# Patient Record
Sex: FEMALE | Race: WHITE | Employment: OTHER | URBAN - NONMETROPOLITAN AREA
[De-identification: names, ages, dates, MRNs, and addresses within clinical notes are randomized per-mention and may not be internally consistent; named-entity substitution may affect disease eponyms.]

---

## 2017-01-01 ENCOUNTER — HOSPITAL ENCOUNTER (INPATIENT)
Facility: HOSPITAL | Age: 82
LOS: 3 days | Discharge: NURSING, REHAB OR LONG-TERM CARE | DRG: 281 | End: 2017-06-20
Attending: FAMILY MEDICINE | Admitting: FAMILY MEDICINE
Payer: MEDICARE

## 2017-01-01 ENCOUNTER — APPOINTMENT (OUTPATIENT)
Dept: PHYSICAL THERAPY | Facility: HOSPITAL | Age: 82
DRG: 194 | End: 2017-01-01
Payer: MEDICARE

## 2017-01-01 ENCOUNTER — DOCUMENTATION ONLY (OUTPATIENT)
Dept: OTHER | Facility: CLINIC | Age: 82
End: 2017-01-01

## 2017-01-01 ENCOUNTER — HOSPITAL ENCOUNTER (EMERGENCY)
Facility: HOSPITAL | Age: 82
Discharge: HOME | End: 2017-04-24
Attending: NURSE PRACTITIONER | Admitting: NURSE PRACTITIONER
Payer: MEDICARE

## 2017-01-01 ENCOUNTER — HOSPITAL ENCOUNTER (EMERGENCY)
Facility: HOSPITAL | Age: 82
Discharge: HOME | End: 2017-05-14
Payer: MEDICARE

## 2017-01-01 ENCOUNTER — MEDICAL CORRESPONDENCE (OUTPATIENT)
Dept: HEALTH INFORMATION MANAGEMENT | Facility: HOSPITAL | Age: 82
End: 2017-01-01

## 2017-01-01 ENCOUNTER — APPOINTMENT (OUTPATIENT)
Dept: PHYSICAL THERAPY | Facility: HOSPITAL | Age: 82
DRG: 194 | End: 2017-01-01
Attending: FAMILY MEDICINE
Payer: MEDICARE

## 2017-01-01 ENCOUNTER — APPOINTMENT (OUTPATIENT)
Dept: ULTRASOUND IMAGING | Facility: HOSPITAL | Age: 82
DRG: 281 | End: 2017-01-01
Payer: MEDICARE

## 2017-01-01 ENCOUNTER — HOSPITAL ENCOUNTER (INPATIENT)
Facility: HOSPITAL | Age: 82
LOS: 3 days | Discharge: NURSING, REHAB OR LONG-TERM CARE | DRG: 194 | End: 2017-06-05
Attending: EMERGENCY MEDICINE | Admitting: FAMILY MEDICINE
Payer: MEDICARE

## 2017-01-01 ENCOUNTER — DOCUMENTATION ONLY (OUTPATIENT)
Dept: FAMILY MEDICINE | Facility: OTHER | Age: 82
End: 2017-01-01

## 2017-01-01 VITALS
BODY MASS INDEX: 33.39 KG/M2 | RESPIRATION RATE: 18 BRPM | SYSTOLIC BLOOD PRESSURE: 125 MMHG | TEMPERATURE: 97.6 F | DIASTOLIC BLOOD PRESSURE: 55 MMHG | HEART RATE: 78 BPM | WEIGHT: 188.49 LBS | OXYGEN SATURATION: 96 %

## 2017-01-01 VITALS
HEART RATE: 82 BPM | RESPIRATION RATE: 16 BRPM | DIASTOLIC BLOOD PRESSURE: 65 MMHG | WEIGHT: 190.48 LBS | TEMPERATURE: 98.2 F | OXYGEN SATURATION: 94 % | SYSTOLIC BLOOD PRESSURE: 148 MMHG | HEIGHT: 63 IN | BODY MASS INDEX: 33.75 KG/M2

## 2017-01-01 VITALS
HEART RATE: 78 BPM | OXYGEN SATURATION: 94 % | RESPIRATION RATE: 20 BRPM | SYSTOLIC BLOOD PRESSURE: 105 MMHG | DIASTOLIC BLOOD PRESSURE: 60 MMHG | TEMPERATURE: 97.3 F

## 2017-01-01 VITALS
HEART RATE: 96 BPM | SYSTOLIC BLOOD PRESSURE: 149 MMHG | DIASTOLIC BLOOD PRESSURE: 85 MMHG | RESPIRATION RATE: 18 BRPM | OXYGEN SATURATION: 93 % | TEMPERATURE: 97 F

## 2017-01-01 DIAGNOSIS — H04.301 DACROCYSTITIS, RIGHT: ICD-10-CM

## 2017-01-01 DIAGNOSIS — S05.01XA CORNEAL ABRASION, RIGHT, INITIAL ENCOUNTER: ICD-10-CM

## 2017-01-01 DIAGNOSIS — R53.1 GENERAL WEAKNESS: ICD-10-CM

## 2017-01-01 DIAGNOSIS — Z71.89 ADVANCE CARE PLANNING: Primary | Chronic | ICD-10-CM

## 2017-01-01 DIAGNOSIS — I21.9 ACUTE MYOCARDIAL INFARCTION, INITIAL EPISODE OF CARE (H): ICD-10-CM

## 2017-01-01 DIAGNOSIS — J18.9 PNEUMONIA OF LEFT LOWER LOBE DUE TO INFECTIOUS ORGANISM: Primary | ICD-10-CM

## 2017-01-01 DIAGNOSIS — R79.89 D-DIMER, ELEVATED: ICD-10-CM

## 2017-01-01 LAB
ALBUMIN SERPL-MCNC: 3 G/DL (ref 3.4–5)
ALBUMIN SERPL-MCNC: 3.3 G/DL (ref 3.4–5)
ALBUMIN UR-MCNC: NEGATIVE MG/DL
ALP SERPL-CCNC: 96 U/L (ref 40–150)
ALP SERPL-CCNC: 98 U/L (ref 40–150)
ALT SERPL W P-5'-P-CCNC: 33 U/L (ref 0–50)
ALT SERPL W P-5'-P-CCNC: 40 U/L (ref 0–50)
AMORPH CRY #/AREA URNS HPF: ABNORMAL /HPF
ANION GAP SERPL CALCULATED.3IONS-SCNC: 10 MMOL/L (ref 3–14)
ANION GAP SERPL CALCULATED.3IONS-SCNC: 12 MMOL/L (ref 3–14)
ANION GAP SERPL CALCULATED.3IONS-SCNC: 13 MMOL/L (ref 3–14)
ANION GAP SERPL CALCULATED.3IONS-SCNC: 7 MMOL/L (ref 3–14)
ANION GAP SERPL CALCULATED.3IONS-SCNC: 8 MMOL/L (ref 3–14)
ANION GAP SERPL CALCULATED.3IONS-SCNC: 8 MMOL/L (ref 3–14)
ANION GAP SERPL CALCULATED.3IONS-SCNC: 9 MMOL/L (ref 3–14)
ANION GAP SERPL CALCULATED.3IONS-SCNC: 9 MMOL/L (ref 3–14)
APPEARANCE UR: CLEAR
APTT PPP: 24 SEC (ref 24–37)
APTT PPP: 26 SEC (ref 24–37)
AST SERPL W P-5'-P-CCNC: 34 U/L (ref 0–45)
AST SERPL W P-5'-P-CCNC: 51 U/L (ref 0–45)
BACTERIA #/AREA URNS HPF: ABNORMAL /HPF
BACTERIA SPEC CULT: ABNORMAL
BACTERIA SPEC CULT: NORMAL
BASOPHILS # BLD AUTO: 0 10E9/L (ref 0–0.2)
BASOPHILS NFR BLD AUTO: 0.2 %
BASOPHILS NFR BLD AUTO: 0.4 %
BASOPHILS NFR BLD AUTO: 0.4 %
BASOPHILS NFR BLD AUTO: 0.5 %
BASOPHILS NFR BLD AUTO: 0.6 %
BILIRUB SERPL-MCNC: 0.4 MG/DL (ref 0.2–1.3)
BILIRUB SERPL-MCNC: 0.5 MG/DL (ref 0.2–1.3)
BILIRUB UR QL STRIP: NEGATIVE
BUN SERPL-MCNC: 14 MG/DL (ref 7–30)
BUN SERPL-MCNC: 19 MG/DL (ref 7–30)
BUN SERPL-MCNC: 20 MG/DL (ref 7–30)
BUN SERPL-MCNC: 22 MG/DL (ref 7–30)
BUN SERPL-MCNC: 37 MG/DL (ref 7–30)
BUN SERPL-MCNC: 48 MG/DL (ref 7–30)
BUN SERPL-MCNC: 69 MG/DL (ref 7–30)
BUN SERPL-MCNC: 82 MG/DL (ref 7–30)
CALCIUM SERPL-MCNC: 8 MG/DL (ref 8.5–10.1)
CALCIUM SERPL-MCNC: 8.2 MG/DL (ref 8.5–10.1)
CALCIUM SERPL-MCNC: 8.3 MG/DL (ref 8.5–10.1)
CALCIUM SERPL-MCNC: 8.4 MG/DL (ref 8.5–10.1)
CALCIUM SERPL-MCNC: 8.7 MG/DL (ref 8.5–10.1)
CALCIUM SERPL-MCNC: 9.4 MG/DL (ref 8.5–10.1)
CHLORIDE SERPL-SCNC: 100 MMOL/L (ref 94–109)
CHLORIDE SERPL-SCNC: 102 MMOL/L (ref 94–109)
CHLORIDE SERPL-SCNC: 104 MMOL/L (ref 94–109)
CHLORIDE SERPL-SCNC: 92 MMOL/L (ref 94–109)
CHLORIDE SERPL-SCNC: 96 MMOL/L (ref 94–109)
CHLORIDE SERPL-SCNC: 97 MMOL/L (ref 94–109)
CO2 SERPL-SCNC: 22 MMOL/L (ref 20–32)
CO2 SERPL-SCNC: 24 MMOL/L (ref 20–32)
CO2 SERPL-SCNC: 25 MMOL/L (ref 20–32)
CO2 SERPL-SCNC: 25 MMOL/L (ref 20–32)
CO2 SERPL-SCNC: 26 MMOL/L (ref 20–32)
CO2 SERPL-SCNC: 26 MMOL/L (ref 20–32)
CO2 SERPL-SCNC: 27 MMOL/L (ref 20–32)
CO2 SERPL-SCNC: 28 MMOL/L (ref 20–32)
COLOR UR AUTO: ABNORMAL
COLOR UR AUTO: YELLOW
COLOR UR AUTO: YELLOW
CREAT SERPL-MCNC: 1.12 MG/DL (ref 0.52–1.04)
CREAT SERPL-MCNC: 1.17 MG/DL (ref 0.52–1.04)
CREAT SERPL-MCNC: 1.31 MG/DL (ref 0.52–1.04)
CREAT SERPL-MCNC: 1.38 MG/DL (ref 0.52–1.04)
CREAT SERPL-MCNC: 1.4 MG/DL (ref 0.52–1.04)
CREAT SERPL-MCNC: 1.61 MG/DL (ref 0.52–1.04)
CREAT SERPL-MCNC: 1.64 MG/DL (ref 0.52–1.04)
CREAT SERPL-MCNC: 2 MG/DL (ref 0.52–1.04)
CRP SERPL-MCNC: <2.9 MG/L (ref 0–8)
D DIMER PPP DDU-MCNC: 1737 NG/ML D-DU (ref 0–300)
DIFFERENTIAL METHOD BLD: ABNORMAL
DIFFERENTIAL METHOD BLD: NORMAL
EOSINOPHIL # BLD AUTO: 0.1 10E9/L (ref 0–0.7)
EOSINOPHIL NFR BLD AUTO: 1.3 %
EOSINOPHIL NFR BLD AUTO: 1.5 %
EOSINOPHIL NFR BLD AUTO: 1.7 %
EOSINOPHIL NFR BLD AUTO: 2.1 %
EOSINOPHIL NFR BLD AUTO: 2.4 %
ERYTHROCYTE [DISTWIDTH] IN BLOOD BY AUTOMATED COUNT: 12.8 % (ref 10–15)
ERYTHROCYTE [DISTWIDTH] IN BLOOD BY AUTOMATED COUNT: 12.9 % (ref 10–15)
ERYTHROCYTE [DISTWIDTH] IN BLOOD BY AUTOMATED COUNT: 13 % (ref 10–15)
ERYTHROCYTE [DISTWIDTH] IN BLOOD BY AUTOMATED COUNT: 13.1 % (ref 10–15)
ERYTHROCYTE [DISTWIDTH] IN BLOOD BY AUTOMATED COUNT: 13.2 % (ref 10–15)
ERYTHROCYTE [DISTWIDTH] IN BLOOD BY AUTOMATED COUNT: 13.3 % (ref 10–15)
ERYTHROCYTE [DISTWIDTH] IN BLOOD BY AUTOMATED COUNT: 13.4 % (ref 10–15)
ERYTHROCYTE [SEDIMENTATION RATE] IN BLOOD BY WESTERGREN METHOD: 30 MM/H (ref 0–30)
EST. AVERAGE GLUCOSE BLD GHB EST-MCNC: 232 MG/DL
GFR SERPL CREATININE-BSD FRML MDRD: 24 ML/MIN/1.7M2
GFR SERPL CREATININE-BSD FRML MDRD: 30 ML/MIN/1.7M2
GFR SERPL CREATININE-BSD FRML MDRD: 30 ML/MIN/1.7M2
GFR SERPL CREATININE-BSD FRML MDRD: 35 ML/MIN/1.7M2
GFR SERPL CREATININE-BSD FRML MDRD: 36 ML/MIN/1.7M2
GFR SERPL CREATININE-BSD FRML MDRD: 38 ML/MIN/1.7M2
GFR SERPL CREATININE-BSD FRML MDRD: 44 ML/MIN/1.7M2
GFR SERPL CREATININE-BSD FRML MDRD: 46 ML/MIN/1.7M2
GLUCOSE BLDC GLUCOMTR-MCNC: 127 MG/DL (ref 70–99)
GLUCOSE BLDC GLUCOMTR-MCNC: 132 MG/DL (ref 70–99)
GLUCOSE BLDC GLUCOMTR-MCNC: 154 MG/DL (ref 70–99)
GLUCOSE BLDC GLUCOMTR-MCNC: 169 MG/DL (ref 70–99)
GLUCOSE BLDC GLUCOMTR-MCNC: 170 MG/DL (ref 70–99)
GLUCOSE BLDC GLUCOMTR-MCNC: 170 MG/DL (ref 70–99)
GLUCOSE BLDC GLUCOMTR-MCNC: 171 MG/DL (ref 70–99)
GLUCOSE BLDC GLUCOMTR-MCNC: 172 MG/DL (ref 70–99)
GLUCOSE BLDC GLUCOMTR-MCNC: 185 MG/DL (ref 70–99)
GLUCOSE BLDC GLUCOMTR-MCNC: 187 MG/DL (ref 70–99)
GLUCOSE BLDC GLUCOMTR-MCNC: 205 MG/DL (ref 70–99)
GLUCOSE BLDC GLUCOMTR-MCNC: 208 MG/DL (ref 70–99)
GLUCOSE BLDC GLUCOMTR-MCNC: 219 MG/DL (ref 70–99)
GLUCOSE BLDC GLUCOMTR-MCNC: 220 MG/DL (ref 70–99)
GLUCOSE BLDC GLUCOMTR-MCNC: 220 MG/DL (ref 70–99)
GLUCOSE BLDC GLUCOMTR-MCNC: 227 MG/DL (ref 70–99)
GLUCOSE BLDC GLUCOMTR-MCNC: 236 MG/DL (ref 70–99)
GLUCOSE BLDC GLUCOMTR-MCNC: 254 MG/DL (ref 70–99)
GLUCOSE BLDC GLUCOMTR-MCNC: 257 MG/DL (ref 70–99)
GLUCOSE BLDC GLUCOMTR-MCNC: 276 MG/DL (ref 70–99)
GLUCOSE SERPL-MCNC: 118 MG/DL (ref 70–99)
GLUCOSE SERPL-MCNC: 178 MG/DL (ref 70–99)
GLUCOSE SERPL-MCNC: 186 MG/DL (ref 70–99)
GLUCOSE SERPL-MCNC: 188 MG/DL (ref 70–99)
GLUCOSE SERPL-MCNC: 192 MG/DL (ref 70–99)
GLUCOSE SERPL-MCNC: 196 MG/DL (ref 70–99)
GLUCOSE SERPL-MCNC: 220 MG/DL (ref 70–99)
GLUCOSE SERPL-MCNC: 269 MG/DL (ref 70–99)
GLUCOSE UR STRIP-MCNC: 70 MG/DL
GLUCOSE UR STRIP-MCNC: NEGATIVE MG/DL
GLUCOSE UR STRIP-MCNC: NEGATIVE MG/DL
HBA1C MFR BLD: 9.7 % (ref 4.3–6)
HCT VFR BLD AUTO: 34.2 % (ref 35–47)
HCT VFR BLD AUTO: 36.3 % (ref 35–47)
HCT VFR BLD AUTO: 37.9 % (ref 35–47)
HCT VFR BLD AUTO: 39.6 % (ref 35–47)
HCT VFR BLD AUTO: 39.9 % (ref 35–47)
HCT VFR BLD AUTO: 40.2 % (ref 35–47)
HCT VFR BLD AUTO: 41.1 % (ref 35–47)
HGB BLD-MCNC: 11.9 G/DL (ref 11.7–15.7)
HGB BLD-MCNC: 12.5 G/DL (ref 11.7–15.7)
HGB BLD-MCNC: 13.1 G/DL (ref 11.7–15.7)
HGB BLD-MCNC: 13.3 G/DL (ref 11.7–15.7)
HGB BLD-MCNC: 13.3 G/DL (ref 11.7–15.7)
HGB BLD-MCNC: 14.3 G/DL (ref 11.7–15.7)
HGB BLD-MCNC: 14.4 G/DL (ref 11.7–15.7)
HGB UR QL STRIP: NEGATIVE
HYALINE CASTS #/AREA URNS LPF: 1 /LPF (ref 0–2)
HYALINE CASTS #/AREA URNS LPF: 5 /LPF (ref 0–2)
IMM GRANULOCYTES # BLD: 0 10E9/L (ref 0–0.4)
IMM GRANULOCYTES NFR BLD: 0 %
IMM GRANULOCYTES NFR BLD: 0.2 %
IMM GRANULOCYTES NFR BLD: 0.3 %
IMM GRANULOCYTES NFR BLD: 0.4 %
IMM GRANULOCYTES NFR BLD: 0.4 %
INR PPP: 1.01 (ref 0.8–1.2)
INR PPP: 1.15 (ref 0.8–1.2)
KETONES BLD-SCNC: 0.3 MMOL/L (ref 0–0.6)
KETONES UR STRIP-MCNC: NEGATIVE MG/DL
LACTATE SERPL-SCNC: 1.8 MMOL/L (ref 0.4–2)
LACTATE SERPL-SCNC: 2.3 MMOL/L (ref 0.4–2)
LACTATE SERPL-SCNC: 2.4 MMOL/L (ref 0.4–2)
LACTATE SERPL-SCNC: 3.1 MMOL/L (ref 0.4–2)
LEUKOCYTE ESTERASE UR QL STRIP: ABNORMAL
LEUKOCYTE ESTERASE UR QL STRIP: NEGATIVE
LEUKOCYTE ESTERASE UR QL STRIP: NEGATIVE
LMWH PPP CHRO-ACNC: 0.47 IU/ML
LMWH PPP CHRO-ACNC: 0.62 IU/ML
LMWH PPP CHRO-ACNC: 0.93 IU/ML
LMWH PPP CHRO-ACNC: 1.11 IU/ML
LMWH PPP CHRO-ACNC: 1.9 IU/ML
LYMPHOCYTES # BLD AUTO: 1.2 10E9/L (ref 0.8–5.3)
LYMPHOCYTES # BLD AUTO: 1.5 10E9/L (ref 0.8–5.3)
LYMPHOCYTES # BLD AUTO: 1.7 10E9/L (ref 0.8–5.3)
LYMPHOCYTES # BLD AUTO: 1.8 10E9/L (ref 0.8–5.3)
LYMPHOCYTES # BLD AUTO: 2.1 10E9/L (ref 0.8–5.3)
LYMPHOCYTES NFR BLD AUTO: 33.4 %
LYMPHOCYTES NFR BLD AUTO: 35.6 %
LYMPHOCYTES NFR BLD AUTO: 36 %
LYMPHOCYTES NFR BLD AUTO: 39 %
LYMPHOCYTES NFR BLD AUTO: 45.5 %
Lab: NORMAL
Lab: NORMAL
MAGNESIUM SERPL-MCNC: 2.1 MG/DL (ref 1.6–2.3)
MAGNESIUM SERPL-MCNC: 2.2 MG/DL (ref 1.6–2.3)
MCH RBC QN AUTO: 32.4 PG (ref 26.5–33)
MCH RBC QN AUTO: 32.7 PG (ref 26.5–33)
MCH RBC QN AUTO: 32.8 PG (ref 26.5–33)
MCH RBC QN AUTO: 32.9 PG (ref 26.5–33)
MCH RBC QN AUTO: 32.9 PG (ref 26.5–33)
MCH RBC QN AUTO: 33.3 PG (ref 26.5–33)
MCH RBC QN AUTO: 33.6 PG (ref 26.5–33)
MCHC RBC AUTO-ENTMCNC: 33.3 G/DL (ref 31.5–36.5)
MCHC RBC AUTO-ENTMCNC: 33.6 G/DL (ref 31.5–36.5)
MCHC RBC AUTO-ENTMCNC: 34.4 G/DL (ref 31.5–36.5)
MCHC RBC AUTO-ENTMCNC: 34.6 G/DL (ref 31.5–36.5)
MCHC RBC AUTO-ENTMCNC: 34.8 G/DL (ref 31.5–36.5)
MCHC RBC AUTO-ENTMCNC: 34.8 G/DL (ref 31.5–36.5)
MCHC RBC AUTO-ENTMCNC: 35.8 G/DL (ref 31.5–36.5)
MCV RBC AUTO: 93 FL (ref 78–100)
MCV RBC AUTO: 94 FL (ref 78–100)
MCV RBC AUTO: 94 FL (ref 78–100)
MCV RBC AUTO: 96 FL (ref 78–100)
MCV RBC AUTO: 97 FL (ref 78–100)
MCV RBC AUTO: 98 FL (ref 78–100)
MCV RBC AUTO: 99 FL (ref 78–100)
MICRO REPORT STATUS: ABNORMAL
MICRO REPORT STATUS: NORMAL
MICROORGANISM SPEC CULT: ABNORMAL
MONOCYTES # BLD AUTO: 0.4 10E9/L (ref 0–1.3)
MONOCYTES # BLD AUTO: 0.4 10E9/L (ref 0–1.3)
MONOCYTES # BLD AUTO: 0.5 10E9/L (ref 0–1.3)
MONOCYTES NFR BLD AUTO: 11.2 %
MONOCYTES NFR BLD AUTO: 12.4 %
MONOCYTES NFR BLD AUTO: 8.9 %
MONOCYTES NFR BLD AUTO: 9 %
MONOCYTES NFR BLD AUTO: 9.1 %
MUCOUS THREADS #/AREA URNS LPF: PRESENT /LPF
NEUTROPHILS # BLD AUTO: 1.5 10E9/L (ref 1.6–8.3)
NEUTROPHILS # BLD AUTO: 1.7 10E9/L (ref 1.6–8.3)
NEUTROPHILS # BLD AUTO: 2.1 10E9/L (ref 1.6–8.3)
NEUTROPHILS # BLD AUTO: 2.6 10E9/L (ref 1.6–8.3)
NEUTROPHILS # BLD AUTO: 3 10E9/L (ref 1.6–8.3)
NEUTROPHILS NFR BLD AUTO: 39.2 %
NEUTROPHILS NFR BLD AUTO: 50 %
NEUTROPHILS NFR BLD AUTO: 50.2 %
NEUTROPHILS NFR BLD AUTO: 52.8 %
NEUTROPHILS NFR BLD AUTO: 55.3 %
NITRATE UR QL: NEGATIVE
NITRATE UR QL: NEGATIVE
NITRATE UR QL: POSITIVE
NRBC # BLD AUTO: 0 10*3/UL
NRBC BLD AUTO-RTO: 0 /100
NT-PROBNP SERPL-MCNC: 788 PG/ML (ref 0–1800)
PH UR STRIP: 5 PH (ref 4.7–8)
PH UR STRIP: 6.5 PH (ref 4.7–8)
PH UR STRIP: 7.5 PH (ref 4.7–8)
PLATELET # BLD AUTO: 145 10E9/L (ref 150–450)
PLATELET # BLD AUTO: 149 10E9/L (ref 150–450)
PLATELET # BLD AUTO: 149 10E9/L (ref 150–450)
PLATELET # BLD AUTO: 166 10E9/L (ref 150–450)
PLATELET # BLD AUTO: 169 10E9/L (ref 150–450)
PLATELET # BLD AUTO: 187 10E9/L (ref 150–450)
PLATELET # BLD AUTO: 202 10E9/L (ref 150–450)
PLATELET # BLD AUTO: 236 10E9/L (ref 150–450)
POTASSIUM SERPL-SCNC: 4 MMOL/L (ref 3.4–5.3)
POTASSIUM SERPL-SCNC: 4.2 MMOL/L (ref 3.4–5.3)
POTASSIUM SERPL-SCNC: 4.5 MMOL/L (ref 3.4–5.3)
POTASSIUM SERPL-SCNC: 4.5 MMOL/L (ref 3.4–5.3)
POTASSIUM SERPL-SCNC: 4.6 MMOL/L (ref 3.4–5.3)
POTASSIUM SERPL-SCNC: 4.6 MMOL/L (ref 3.4–5.3)
POTASSIUM SERPL-SCNC: 4.8 MMOL/L (ref 3.4–5.3)
POTASSIUM SERPL-SCNC: 5.3 MMOL/L (ref 3.4–5.3)
PROT SERPL-MCNC: 7.7 G/DL (ref 6.8–8.8)
PROT SERPL-MCNC: 8 G/DL (ref 6.8–8.8)
RBC # BLD AUTO: 3.54 10E12/L (ref 3.8–5.2)
RBC # BLD AUTO: 3.8 10E12/L (ref 3.8–5.2)
RBC # BLD AUTO: 4.04 10E12/L (ref 3.8–5.2)
RBC # BLD AUTO: 4.04 10E12/L (ref 3.8–5.2)
RBC # BLD AUTO: 4.06 10E12/L (ref 3.8–5.2)
RBC # BLD AUTO: 4.33 10E12/L (ref 3.8–5.2)
RBC # BLD AUTO: 4.37 10E12/L (ref 3.8–5.2)
RBC #/AREA URNS AUTO: <1 /HPF (ref 0–2)
SODIUM SERPL-SCNC: 130 MMOL/L (ref 133–144)
SODIUM SERPL-SCNC: 131 MMOL/L (ref 133–144)
SODIUM SERPL-SCNC: 133 MMOL/L (ref 133–144)
SODIUM SERPL-SCNC: 134 MMOL/L (ref 133–144)
SODIUM SERPL-SCNC: 136 MMOL/L (ref 133–144)
SODIUM SERPL-SCNC: 136 MMOL/L (ref 133–144)
SODIUM SERPL-SCNC: 137 MMOL/L (ref 133–144)
SODIUM SERPL-SCNC: 137 MMOL/L (ref 133–144)
SP GR UR STRIP: 1 (ref 1–1.03)
SP GR UR STRIP: 1.01 (ref 1–1.03)
SP GR UR STRIP: 1.01 (ref 1–1.03)
SPECIMEN SOURCE: ABNORMAL
SPECIMEN SOURCE: NORMAL
TROPONIN I SERPL-MCNC: 0.05 UG/L (ref 0–0.04)
TROPONIN I SERPL-MCNC: 0.22 UG/L (ref 0–0.04)
TROPONIN I SERPL-MCNC: 0.82 UG/L (ref 0–0.04)
TROPONIN I SERPL-MCNC: 5.98 UG/L (ref 0–0.04)
TSH SERPL DL<=0.05 MIU/L-ACNC: 1.65 MU/L (ref 0.4–4)
URN SPEC COLLECT METH UR: ABNORMAL
UROBILINOGEN UR STRIP-MCNC: NORMAL MG/DL (ref 0–2)
WBC # BLD AUTO: 3.1 10E9/L (ref 4–11)
WBC # BLD AUTO: 3.3 10E9/L (ref 4–11)
WBC # BLD AUTO: 3.3 10E9/L (ref 4–11)
WBC # BLD AUTO: 3.8 10E9/L (ref 4–11)
WBC # BLD AUTO: 4.1 10E9/L (ref 4–11)
WBC # BLD AUTO: 5.3 10E9/L (ref 4–11)
WBC # BLD AUTO: 5.4 10E9/L (ref 4–11)
WBC #/AREA URNS AUTO: 2 /HPF (ref 0–2)
WBC #/AREA URNS AUTO: <1 /HPF (ref 0–2)
WBC #/AREA URNS AUTO: <1 /HPF (ref 0–2)

## 2017-01-01 PROCEDURE — 25000132 ZZH RX MED GY IP 250 OP 250 PS 637: Mod: GY | Performed by: FAMILY MEDICINE

## 2017-01-01 PROCEDURE — 40000193 ZZH STATISTIC PT WARD VISIT

## 2017-01-01 PROCEDURE — 40000275 ZZH STATISTIC RCP TIME EA 10 MIN

## 2017-01-01 PROCEDURE — 87086 URINE CULTURE/COLONY COUNT: CPT | Performed by: EMERGENCY MEDICINE

## 2017-01-01 PROCEDURE — 71020 ZZHC CHEST TWO VIEWS, FRONT/LAT: CPT | Mod: TC

## 2017-01-01 PROCEDURE — 99283 EMERGENCY DEPT VISIT LOW MDM: CPT | Performed by: FAMILY MEDICINE

## 2017-01-01 PROCEDURE — 71010 ZZHC CHEST ONE VIEW: CPT | Mod: TC

## 2017-01-01 PROCEDURE — 97530 THERAPEUTIC ACTIVITIES: CPT | Mod: GP

## 2017-01-01 PROCEDURE — A9270 NON-COVERED ITEM OR SERVICE: HCPCS | Mod: GY | Performed by: FAMILY MEDICINE

## 2017-01-01 PROCEDURE — 85027 COMPLETE CBC AUTOMATED: CPT | Performed by: FAMILY MEDICINE

## 2017-01-01 PROCEDURE — 94640 AIRWAY INHALATION TREATMENT: CPT

## 2017-01-01 PROCEDURE — 84443 ASSAY THYROID STIM HORMONE: CPT | Performed by: EMERGENCY MEDICINE

## 2017-01-01 PROCEDURE — 36416 COLLJ CAPILLARY BLOOD SPEC: CPT | Performed by: EMERGENCY MEDICINE

## 2017-01-01 PROCEDURE — 83735 ASSAY OF MAGNESIUM: CPT | Performed by: FAMILY MEDICINE

## 2017-01-01 PROCEDURE — 25000128 H RX IP 250 OP 636: Performed by: FAMILY MEDICINE

## 2017-01-01 PROCEDURE — 36415 COLL VENOUS BLD VENIPUNCTURE: CPT | Performed by: FAMILY MEDICINE

## 2017-01-01 PROCEDURE — 25000128 H RX IP 250 OP 636

## 2017-01-01 PROCEDURE — 85730 THROMBOPLASTIN TIME PARTIAL: CPT | Performed by: FAMILY MEDICINE

## 2017-01-01 PROCEDURE — 80048 BASIC METABOLIC PNL TOTAL CA: CPT | Performed by: FAMILY MEDICINE

## 2017-01-01 PROCEDURE — 25000125 ZZHC RX 250

## 2017-01-01 PROCEDURE — 85025 COMPLETE CBC W/AUTO DIFF WBC: CPT | Performed by: EMERGENCY MEDICINE

## 2017-01-01 PROCEDURE — 25000131 ZZH RX MED GY IP 250 OP 636 PS 637: Mod: GY | Performed by: FAMILY MEDICINE

## 2017-01-01 PROCEDURE — 93306 TTE W/DOPPLER COMPLETE: CPT | Mod: TC

## 2017-01-01 PROCEDURE — 84484 ASSAY OF TROPONIN QUANT: CPT | Performed by: FAMILY MEDICINE

## 2017-01-01 PROCEDURE — 83605 ASSAY OF LACTIC ACID: CPT | Performed by: EMERGENCY MEDICINE

## 2017-01-01 PROCEDURE — 85610 PROTHROMBIN TIME: CPT | Performed by: FAMILY MEDICINE

## 2017-01-01 PROCEDURE — 81001 URINALYSIS AUTO W/SCOPE: CPT | Performed by: EMERGENCY MEDICINE

## 2017-01-01 PROCEDURE — 85025 COMPLETE CBC W/AUTO DIFF WBC: CPT

## 2017-01-01 PROCEDURE — 96365 THER/PROPH/DIAG IV INF INIT: CPT

## 2017-01-01 PROCEDURE — 40000893 ZZH STATISTIC PT IP EVAL DEFER: Performed by: PHYSICAL THERAPIST

## 2017-01-01 PROCEDURE — 99285 EMERGENCY DEPT VISIT HI MDM: CPT | Mod: 25

## 2017-01-01 PROCEDURE — 25000132 ZZH RX MED GY IP 250 OP 250 PS 637: Mod: GY | Performed by: NURSE PRACTITIONER

## 2017-01-01 PROCEDURE — 20000003 ZZH R&B ICU

## 2017-01-01 PROCEDURE — 85520 HEPARIN ASSAY: CPT | Performed by: FAMILY MEDICINE

## 2017-01-01 PROCEDURE — 80053 COMPREHEN METABOLIC PANEL: CPT | Performed by: FAMILY MEDICINE

## 2017-01-01 PROCEDURE — 82010 KETONE BODYS QUAN: CPT | Performed by: EMERGENCY MEDICINE

## 2017-01-01 PROCEDURE — 12000000 ZZH R&B MED SURG/OB

## 2017-01-01 PROCEDURE — 00000146 ZZHCL STATISTIC GLUCOSE BY METER IP

## 2017-01-01 PROCEDURE — 83036 HEMOGLOBIN GLYCOSYLATED A1C: CPT | Performed by: FAMILY MEDICINE

## 2017-01-01 PROCEDURE — 93005 ELECTROCARDIOGRAM TRACING: CPT

## 2017-01-01 PROCEDURE — 70450 CT HEAD/BRAIN W/O DYE: CPT | Mod: TC

## 2017-01-01 PROCEDURE — 87088 URINE BACTERIA CULTURE: CPT | Performed by: EMERGENCY MEDICINE

## 2017-01-01 PROCEDURE — 85652 RBC SED RATE AUTOMATED: CPT

## 2017-01-01 PROCEDURE — 25000128 H RX IP 250 OP 636: Performed by: EMERGENCY MEDICINE

## 2017-01-01 PROCEDURE — 99214 OFFICE O/P EST MOD 30 MIN: CPT | Performed by: NURSE PRACTITIONER

## 2017-01-01 PROCEDURE — 87040 BLOOD CULTURE FOR BACTERIA: CPT | Performed by: EMERGENCY MEDICINE

## 2017-01-01 PROCEDURE — 80053 COMPREHEN METABOLIC PANEL: CPT | Performed by: EMERGENCY MEDICINE

## 2017-01-01 PROCEDURE — 99285 EMERGENCY DEPT VISIT HI MDM: CPT | Performed by: EMERGENCY MEDICINE

## 2017-01-01 PROCEDURE — 99284 EMERGENCY DEPT VISIT MOD MDM: CPT | Mod: 25

## 2017-01-01 PROCEDURE — 93010 ELECTROCARDIOGRAM REPORT: CPT | Mod: 76 | Performed by: INTERNAL MEDICINE

## 2017-01-01 PROCEDURE — 87186 SC STD MICRODIL/AGAR DIL: CPT | Performed by: EMERGENCY MEDICINE

## 2017-01-01 PROCEDURE — 85025 COMPLETE CBC W/AUTO DIFF WBC: CPT | Performed by: FAMILY MEDICINE

## 2017-01-01 PROCEDURE — 99284 EMERGENCY DEPT VISIT MOD MDM: CPT

## 2017-01-01 PROCEDURE — 40000788 ZZHCL STATISTIC ESTIMATED AVERAGE GLUCOSE: Performed by: FAMILY MEDICINE

## 2017-01-01 PROCEDURE — A9270 NON-COVERED ITEM OR SERVICE: HCPCS | Mod: GY | Performed by: NURSE PRACTITIONER

## 2017-01-01 PROCEDURE — 81001 URINALYSIS AUTO W/SCOPE: CPT | Performed by: FAMILY MEDICINE

## 2017-01-01 PROCEDURE — 25000132 ZZH RX MED GY IP 250 OP 250 PS 637: Mod: GY

## 2017-01-01 PROCEDURE — 96361 HYDRATE IV INFUSION ADD-ON: CPT

## 2017-01-01 PROCEDURE — 96374 THER/PROPH/DIAG INJ IV PUSH: CPT

## 2017-01-01 PROCEDURE — 85730 THROMBOPLASTIN TIME PARTIAL: CPT | Performed by: EMERGENCY MEDICINE

## 2017-01-01 PROCEDURE — 83880 ASSAY OF NATRIURETIC PEPTIDE: CPT | Performed by: FAMILY MEDICINE

## 2017-01-01 PROCEDURE — 97110 THERAPEUTIC EXERCISES: CPT | Mod: GP

## 2017-01-01 PROCEDURE — 85379 FIBRIN DEGRADATION QUANT: CPT | Performed by: EMERGENCY MEDICINE

## 2017-01-01 PROCEDURE — 93306 TTE W/DOPPLER COMPLETE: CPT | Mod: 26 | Performed by: INTERNAL MEDICINE

## 2017-01-01 PROCEDURE — 86140 C-REACTIVE PROTEIN: CPT

## 2017-01-01 PROCEDURE — 36416 COLLJ CAPILLARY BLOOD SPEC: CPT

## 2017-01-01 PROCEDURE — 85049 AUTOMATED PLATELET COUNT: CPT | Performed by: FAMILY MEDICINE

## 2017-01-01 PROCEDURE — 40000786 ZZHCL STATISTIC ACTIVE MRSA SURVEILLANCE CULTURE: Performed by: FAMILY MEDICINE

## 2017-01-01 PROCEDURE — 93010 ELECTROCARDIOGRAM REPORT: CPT | Mod: 77 | Performed by: INTERNAL MEDICINE

## 2017-01-01 PROCEDURE — 36415 COLL VENOUS BLD VENIPUNCTURE: CPT | Performed by: EMERGENCY MEDICINE

## 2017-01-01 PROCEDURE — 83605 ASSAY OF LACTIC ACID: CPT | Performed by: FAMILY MEDICINE

## 2017-01-01 PROCEDURE — 97161 PT EVAL LOW COMPLEX 20 MIN: CPT | Mod: GP

## 2017-01-01 PROCEDURE — 99213 OFFICE O/P EST LOW 20 MIN: CPT

## 2017-01-01 PROCEDURE — A9270 NON-COVERED ITEM OR SERVICE: HCPCS | Mod: GY

## 2017-01-01 PROCEDURE — 85610 PROTHROMBIN TIME: CPT | Performed by: EMERGENCY MEDICINE

## 2017-01-01 RX ORDER — SIMVASTATIN 20 MG
20 TABLET ORAL AT BEDTIME
Status: DISCONTINUED | OUTPATIENT
Start: 2017-01-01 | End: 2017-01-01 | Stop reason: HOSPADM

## 2017-01-01 RX ORDER — NITROGLYCERIN 20 MG/100ML
0.07-2 INJECTION INTRAVENOUS CONTINUOUS
Status: DISCONTINUED | OUTPATIENT
Start: 2017-01-01 | End: 2017-01-01

## 2017-01-01 RX ORDER — FUROSEMIDE 20 MG
20 TABLET ORAL DAILY
Status: DISCONTINUED | OUTPATIENT
Start: 2017-01-01 | End: 2017-01-01

## 2017-01-01 RX ORDER — ACETAMINOPHEN 325 MG/1
650 TABLET ORAL EVERY 4 HOURS PRN
Status: DISCONTINUED | OUTPATIENT
Start: 2017-01-01 | End: 2017-01-01 | Stop reason: HOSPADM

## 2017-01-01 RX ORDER — CARVEDILOL 3.12 MG/1
6.25 TABLET ORAL 2 TIMES DAILY WITH MEALS
Status: DISCONTINUED | OUTPATIENT
Start: 2017-01-01 | End: 2017-01-01

## 2017-01-01 RX ORDER — METOPROLOL TARTRATE 1 MG/ML
2.5 INJECTION, SOLUTION INTRAVENOUS EVERY 8 HOURS
Status: DISCONTINUED | OUTPATIENT
Start: 2017-01-01 | End: 2017-01-01

## 2017-01-01 RX ORDER — ONDANSETRON 2 MG/ML
4 INJECTION INTRAMUSCULAR; INTRAVENOUS EVERY 6 HOURS PRN
Status: DISCONTINUED | OUTPATIENT
Start: 2017-01-01 | End: 2017-01-01

## 2017-01-01 RX ORDER — LEVOFLOXACIN 5 MG/ML
250 INJECTION, SOLUTION INTRAVENOUS EVERY 24 HOURS
Status: DISCONTINUED | OUTPATIENT
Start: 2017-01-01 | End: 2017-01-01 | Stop reason: HOSPADM

## 2017-01-01 RX ORDER — AMOXICILLIN 250 MG
1-2 CAPSULE ORAL 2 TIMES DAILY
Status: DISCONTINUED | OUTPATIENT
Start: 2017-01-01 | End: 2017-01-01

## 2017-01-01 RX ORDER — NICOTINE POLACRILEX 4 MG
15-30 LOZENGE BUCCAL
Status: DISCONTINUED | OUTPATIENT
Start: 2017-01-01 | End: 2017-01-01 | Stop reason: HOSPADM

## 2017-01-01 RX ORDER — ATORVASTATIN CALCIUM 40 MG/1
80 TABLET, FILM COATED ORAL
Status: DISCONTINUED | OUTPATIENT
Start: 2017-01-01 | End: 2017-01-01

## 2017-01-01 RX ORDER — NITROGLYCERIN 0.4 MG/1
0.4 TABLET SUBLINGUAL EVERY 5 MIN PRN
Status: DISCONTINUED | OUTPATIENT
Start: 2017-01-01 | End: 2017-01-01 | Stop reason: HOSPADM

## 2017-01-01 RX ORDER — CARBOXYMETHYLCELLULOSE SODIUM 5 MG/ML
1 SOLUTION/ DROPS OPHTHALMIC DAILY PRN
Status: DISCONTINUED | OUTPATIENT
Start: 2017-01-01 | End: 2017-01-01

## 2017-01-01 RX ORDER — NALOXONE HYDROCHLORIDE 0.4 MG/ML
.1-.4 INJECTION, SOLUTION INTRAMUSCULAR; INTRAVENOUS; SUBCUTANEOUS
Status: DISCONTINUED | OUTPATIENT
Start: 2017-01-01 | End: 2017-01-01

## 2017-01-01 RX ORDER — PROCHLORPERAZINE 25 MG
12.5 SUPPOSITORY, RECTAL RECTAL EVERY 12 HOURS PRN
Status: DISCONTINUED | OUTPATIENT
Start: 2017-01-01 | End: 2017-01-01 | Stop reason: HOSPADM

## 2017-01-01 RX ORDER — NEOMYCIN POLYMYXIN B SULFATES AND DEXAMETHASONE 3.5; 10000; 1 MG/ML; [USP'U]/ML; MG/ML
1 SUSPENSION/ DROPS OPHTHALMIC 4 TIMES DAILY
Qty: 1 BOTTLE | Refills: 1 | Status: SHIPPED | OUTPATIENT
Start: 2017-01-01 | End: 2017-01-01

## 2017-01-01 RX ORDER — ENALAPRILAT 1.25 MG/ML
1.25 INJECTION INTRAVENOUS EVERY 6 HOURS
Status: DISCONTINUED | OUTPATIENT
Start: 2017-01-01 | End: 2017-01-01

## 2017-01-01 RX ORDER — CARBOXYMETHYLCELLULOSE SODIUM 5 MG/ML
1 SOLUTION/ DROPS OPHTHALMIC DAILY PRN
Status: DISCONTINUED | OUTPATIENT
Start: 2017-01-01 | End: 2017-01-01 | Stop reason: CLARIF

## 2017-01-01 RX ORDER — ASPIRIN 325 MG
325 TABLET ORAL DAILY
Status: DISCONTINUED | OUTPATIENT
Start: 2017-01-01 | End: 2017-01-01 | Stop reason: DRUGHIGH

## 2017-01-01 RX ORDER — ONDANSETRON 4 MG/1
4 TABLET, ORALLY DISINTEGRATING ORAL EVERY 6 HOURS PRN
Status: DISCONTINUED | OUTPATIENT
Start: 2017-01-01 | End: 2017-01-01 | Stop reason: HOSPADM

## 2017-01-01 RX ORDER — LISINOPRIL 20 MG/1
40 TABLET ORAL DAILY
Status: DISCONTINUED | OUTPATIENT
Start: 2017-01-01 | End: 2017-01-01

## 2017-01-01 RX ORDER — ASPIRIN 81 MG/1
324 TABLET, CHEWABLE ORAL ONCE
Status: DISCONTINUED | OUTPATIENT
Start: 2017-01-01 | End: 2017-01-01

## 2017-01-01 RX ORDER — TRAMADOL HYDROCHLORIDE 50 MG/1
50 TABLET ORAL DAILY
Status: DISCONTINUED | OUTPATIENT
Start: 2017-01-01 | End: 2017-01-01

## 2017-01-01 RX ORDER — ASPIRIN 81 MG/1
81 TABLET ORAL DAILY
Status: DISCONTINUED | OUTPATIENT
Start: 2017-01-01 | End: 2017-01-01

## 2017-01-01 RX ORDER — MIRTAZAPINE 30 MG/1
30 TABLET, FILM COATED ORAL AT BEDTIME
Status: DISCONTINUED | OUTPATIENT
Start: 2017-01-01 | End: 2017-01-01

## 2017-01-01 RX ORDER — LISINOPRIL 20 MG/1
40 TABLET ORAL DAILY
Status: DISCONTINUED | OUTPATIENT
Start: 2017-01-01 | End: 2017-01-01 | Stop reason: HOSPADM

## 2017-01-01 RX ORDER — LEVOFLOXACIN 5 MG/ML
500 INJECTION, SOLUTION INTRAVENOUS ONCE
Status: COMPLETED | OUTPATIENT
Start: 2017-01-01 | End: 2017-01-01

## 2017-01-01 RX ORDER — LIDOCAINE 40 MG/G
CREAM TOPICAL
Status: DISCONTINUED | OUTPATIENT
Start: 2017-01-01 | End: 2017-01-01

## 2017-01-01 RX ORDER — PROCHLORPERAZINE MALEATE 5 MG
5 TABLET ORAL EVERY 6 HOURS PRN
Status: DISCONTINUED | OUTPATIENT
Start: 2017-01-01 | End: 2017-01-01 | Stop reason: HOSPADM

## 2017-01-01 RX ORDER — NALOXONE HYDROCHLORIDE 0.4 MG/ML
.1-.4 INJECTION, SOLUTION INTRAMUSCULAR; INTRAVENOUS; SUBCUTANEOUS
Status: DISCONTINUED | OUTPATIENT
Start: 2017-01-01 | End: 2017-01-01 | Stop reason: HOSPADM

## 2017-01-01 RX ORDER — PROCHLORPERAZINE 25 MG
12.5 SUPPOSITORY, RECTAL RECTAL EVERY 12 HOURS PRN
Status: DISCONTINUED | OUTPATIENT
Start: 2017-01-01 | End: 2017-01-01

## 2017-01-01 RX ORDER — SIMVASTATIN 20 MG
20 TABLET ORAL AT BEDTIME
Status: DISCONTINUED | OUTPATIENT
Start: 2017-01-01 | End: 2017-01-01

## 2017-01-01 RX ORDER — TRAMADOL HYDROCHLORIDE 50 MG/1
50 TABLET ORAL EVERY 6 HOURS PRN
Status: DISCONTINUED | OUTPATIENT
Start: 2017-01-01 | End: 2017-01-01 | Stop reason: HOSPADM

## 2017-01-01 RX ORDER — OXYBUTYNIN CHLORIDE 5 MG/1
10 TABLET ORAL AT BEDTIME
Status: DISCONTINUED | OUTPATIENT
Start: 2017-01-01 | End: 2017-01-01

## 2017-01-01 RX ORDER — LEVOFLOXACIN 5 MG/ML
500 INJECTION, SOLUTION INTRAVENOUS EVERY 24 HOURS
Status: DISCONTINUED | OUTPATIENT
Start: 2017-01-01 | End: 2017-01-01

## 2017-01-01 RX ORDER — FUROSEMIDE 20 MG
40 TABLET ORAL DAILY
Status: DISCONTINUED | OUTPATIENT
Start: 2017-01-01 | End: 2017-01-01 | Stop reason: HOSPADM

## 2017-01-01 RX ORDER — TETRACAINE HYDROCHLORIDE 5 MG/ML
1-2 SOLUTION OPHTHALMIC ONCE
Status: COMPLETED | OUTPATIENT
Start: 2017-01-01 | End: 2017-01-01

## 2017-01-01 RX ORDER — SODIUM CHLORIDE 9 MG/ML
INJECTION, SOLUTION INTRAVENOUS CONTINUOUS
Status: DISCONTINUED | OUTPATIENT
Start: 2017-01-01 | End: 2017-01-01

## 2017-01-01 RX ORDER — ONDANSETRON 2 MG/ML
4 INJECTION INTRAMUSCULAR; INTRAVENOUS EVERY 6 HOURS PRN
Status: DISCONTINUED | OUTPATIENT
Start: 2017-01-01 | End: 2017-01-01 | Stop reason: HOSPADM

## 2017-01-01 RX ORDER — LORAZEPAM 0.5 MG/1
.5-1 TABLET ORAL
Status: DISCONTINUED | OUTPATIENT
Start: 2017-01-01 | End: 2017-01-01 | Stop reason: HOSPADM

## 2017-01-01 RX ORDER — POLYMYXIN B SULFATE AND TRIMETHOPRIM 1; 10000 MG/ML; [USP'U]/ML
1 SOLUTION OPHTHALMIC EVERY 4 HOURS
Qty: 1 BOTTLE | Refills: 0 | Status: SHIPPED | OUTPATIENT
Start: 2017-01-01 | End: 2017-01-01

## 2017-01-01 RX ORDER — CLOPIDOGREL BISULFATE 75 MG/1
75 TABLET ORAL DAILY
Status: DISCONTINUED | OUTPATIENT
Start: 2017-01-01 | End: 2017-01-01

## 2017-01-01 RX ORDER — LEVOFLOXACIN 250 MG/1
250 TABLET, FILM COATED ORAL DAILY
Qty: 3 TABLET | Refills: 0 | DISCHARGE
Start: 2017-01-01 | End: 2017-01-01

## 2017-01-01 RX ORDER — ONDANSETRON 4 MG/1
4 TABLET, ORALLY DISINTEGRATING ORAL EVERY 6 HOURS PRN
Status: DISCONTINUED | OUTPATIENT
Start: 2017-01-01 | End: 2017-01-01

## 2017-01-01 RX ORDER — BISACODYL 5 MG
5-15 TABLET, DELAYED RELEASE (ENTERIC COATED) ORAL DAILY PRN
Status: DISCONTINUED | OUTPATIENT
Start: 2017-01-01 | End: 2017-01-01

## 2017-01-01 RX ORDER — FUROSEMIDE 10 MG/ML
10 INJECTION INTRAMUSCULAR; INTRAVENOUS ONCE
Status: DISCONTINUED | OUTPATIENT
Start: 2017-01-01 | End: 2017-01-01

## 2017-01-01 RX ORDER — ATROPINE SULFATE 10 MG/ML
1-2 SOLUTION/ DROPS OPHTHALMIC
Status: DISCONTINUED | OUTPATIENT
Start: 2017-01-01 | End: 2017-01-01 | Stop reason: HOSPADM

## 2017-01-01 RX ORDER — ACETAMINOPHEN 650 MG/1
650 SUPPOSITORY RECTAL EVERY 6 HOURS PRN
Status: DISCONTINUED | OUTPATIENT
Start: 2017-01-01 | End: 2017-01-01 | Stop reason: HOSPADM

## 2017-01-01 RX ORDER — CARBOXYMETHYLCELLULOSE SODIUM 5 MG/ML
1 SOLUTION/ DROPS OPHTHALMIC DAILY PRN
COMMUNITY

## 2017-01-01 RX ORDER — OXYBUTYNIN CHLORIDE 5 MG/1
10 TABLET ORAL 2 TIMES DAILY
Status: DISCONTINUED | OUTPATIENT
Start: 2017-01-01 | End: 2017-01-01 | Stop reason: HOSPADM

## 2017-01-01 RX ORDER — DEXTROSE MONOHYDRATE 25 G/50ML
25-50 INJECTION, SOLUTION INTRAVENOUS
Status: DISCONTINUED | OUTPATIENT
Start: 2017-01-01 | End: 2017-01-01 | Stop reason: HOSPADM

## 2017-01-01 RX ORDER — NITROGLYCERIN 20 MG/100ML
INJECTION INTRAVENOUS
Status: COMPLETED
Start: 2017-01-01 | End: 2017-01-01

## 2017-01-01 RX ORDER — ALUMINA, MAGNESIA, AND SIMETHICONE 2400; 2400; 240 MG/30ML; MG/30ML; MG/30ML
15-30 SUSPENSION ORAL EVERY 4 HOURS PRN
Status: DISCONTINUED | OUTPATIENT
Start: 2017-01-01 | End: 2017-01-01 | Stop reason: HOSPADM

## 2017-01-01 RX ORDER — ACETAMINOPHEN 325 MG/1
325 TABLET ORAL EVERY 6 HOURS PRN
Status: DISCONTINUED | OUTPATIENT
Start: 2017-01-01 | End: 2017-01-01 | Stop reason: HOSPADM

## 2017-01-01 RX ORDER — BISACODYL 10 MG
10 SUPPOSITORY, RECTAL RECTAL
Status: DISCONTINUED | OUTPATIENT
Start: 2017-01-01 | End: 2017-01-01 | Stop reason: HOSPADM

## 2017-01-01 RX ORDER — POLYETHYLENE GLYCOL 3350 17 G/17G
17 POWDER, FOR SOLUTION ORAL EVERY OTHER DAY
Status: DISCONTINUED | OUTPATIENT
Start: 2017-01-01 | End: 2017-01-01 | Stop reason: HOSPADM

## 2017-01-01 RX ORDER — HYDROMORPHONE HYDROCHLORIDE 1 MG/ML
.2-.4 INJECTION, SOLUTION INTRAMUSCULAR; INTRAVENOUS; SUBCUTANEOUS
Status: DISCONTINUED | OUTPATIENT
Start: 2017-01-01 | End: 2017-01-01 | Stop reason: HOSPADM

## 2017-01-01 RX ORDER — ASPIRIN 81 MG/1
81 TABLET ORAL DAILY
Status: DISCONTINUED | OUTPATIENT
Start: 2017-01-01 | End: 2017-01-01 | Stop reason: HOSPADM

## 2017-01-01 RX ORDER — PHENAZOPYRIDINE HYDROCHLORIDE 100 MG/1
100 TABLET, FILM COATED ORAL ONCE
Status: COMPLETED | OUTPATIENT
Start: 2017-01-01 | End: 2017-01-01

## 2017-01-01 RX ORDER — E-LYTES/CARBOXYMETHYLCELLULOSE
1 AEROSOL, SPRAY WITH PUMP (ML) MUCOUS MEMBRANE
Status: DISCONTINUED | OUTPATIENT
Start: 2017-01-01 | End: 2017-01-01 | Stop reason: HOSPADM

## 2017-01-01 RX ORDER — PROCHLORPERAZINE MALEATE 5 MG
5 TABLET ORAL EVERY 6 HOURS PRN
Status: DISCONTINUED | OUTPATIENT
Start: 2017-01-01 | End: 2017-01-01

## 2017-01-01 RX ORDER — MIRTAZAPINE 30 MG/1
30 TABLET, FILM COATED ORAL AT BEDTIME
Status: DISCONTINUED | OUTPATIENT
Start: 2017-01-01 | End: 2017-01-01 | Stop reason: HOSPADM

## 2017-01-01 RX ORDER — CARVEDILOL 3.12 MG/1
6.25 TABLET ORAL 2 TIMES DAILY WITH MEALS
Status: DISCONTINUED | OUTPATIENT
Start: 2017-01-01 | End: 2017-01-01 | Stop reason: HOSPADM

## 2017-01-01 RX ORDER — VITS A,C,E/LUTEIN/MINERALS 300MCG-200
1 TABLET ORAL DAILY
Status: DISCONTINUED | OUTPATIENT
Start: 2017-01-01 | End: 2017-01-01

## 2017-01-01 RX ORDER — GABAPENTIN 400 MG/1
400 CAPSULE ORAL 2 TIMES DAILY
Status: DISCONTINUED | OUTPATIENT
Start: 2017-01-01 | End: 2017-01-01 | Stop reason: HOSPADM

## 2017-01-01 RX ORDER — LEVOFLOXACIN 5 MG/ML
250 INJECTION, SOLUTION INTRAVENOUS
Status: DISCONTINUED | OUTPATIENT
Start: 2017-01-01 | End: 2017-01-01

## 2017-01-01 RX ORDER — ACETAMINOPHEN 325 MG/1
975 TABLET ORAL ONCE
Status: COMPLETED | OUTPATIENT
Start: 2017-01-01 | End: 2017-01-01

## 2017-01-01 RX ORDER — LIDOCAINE 40 MG/G
CREAM TOPICAL
Status: DISCONTINUED | OUTPATIENT
Start: 2017-01-01 | End: 2017-01-01 | Stop reason: HOSPADM

## 2017-01-01 RX ORDER — NITROGLYCERIN 20 MG/100ML
.07-2 INJECTION INTRAVENOUS CONTINUOUS
Status: DISCONTINUED | OUTPATIENT
Start: 2017-01-01 | End: 2017-01-01

## 2017-01-01 RX ORDER — ASPIRIN 81 MG/1
81 TABLET, CHEWABLE ORAL DAILY
Status: DISCONTINUED | OUTPATIENT
Start: 2017-01-01 | End: 2017-01-01 | Stop reason: HOSPADM

## 2017-01-01 RX ORDER — ACETAMINOPHEN 325 MG/1
650 TABLET ORAL EVERY 6 HOURS PRN
Status: DISCONTINUED | OUTPATIENT
Start: 2017-01-01 | End: 2017-01-01

## 2017-01-01 RX ORDER — ACETAMINOPHEN 325 MG/1
650 TABLET ORAL EVERY 4 HOURS PRN
Status: DISCONTINUED | OUTPATIENT
Start: 2017-01-01 | End: 2017-01-01

## 2017-01-01 RX ORDER — HEPARIN SODIUM 10000 [USP'U]/100ML
0-3500 INJECTION, SOLUTION INTRAVENOUS CONTINUOUS
Status: DISCONTINUED | OUTPATIENT
Start: 2017-01-01 | End: 2017-01-01

## 2017-01-01 RX ORDER — I-VITE, TAB 1000-60-2MG (60/BT) 300MCG-200
1 TAB ORAL
Status: DISCONTINUED | OUTPATIENT
Start: 2017-01-01 | End: 2017-01-01

## 2017-01-01 RX ORDER — GABAPENTIN 400 MG/1
400 CAPSULE ORAL 2 TIMES DAILY
Status: DISCONTINUED | OUTPATIENT
Start: 2017-01-01 | End: 2017-01-01

## 2017-01-01 RX ORDER — VITS A,C,E/LUTEIN/MINERALS 300MCG-200
1 TABLET ORAL
Status: DISCONTINUED | OUTPATIENT
Start: 2017-01-01 | End: 2017-01-01 | Stop reason: HOSPADM

## 2017-01-01 RX ORDER — IBUPROFEN 600 MG/1
600 TABLET, FILM COATED ORAL ONCE
Status: COMPLETED | OUTPATIENT
Start: 2017-01-01 | End: 2017-01-01

## 2017-01-01 RX ORDER — NITROGLYCERIN 20 MG/100ML
INJECTION INTRAVENOUS
Status: DISCONTINUED
Start: 2017-01-01 | End: 2017-01-01 | Stop reason: HOSPADM

## 2017-01-01 RX ORDER — ACETAMINOPHEN 650 MG/1
650 SUPPOSITORY RECTAL EVERY 4 HOURS PRN
Status: DISCONTINUED | OUTPATIENT
Start: 2017-01-01 | End: 2017-01-01

## 2017-01-01 RX ORDER — HALOPERIDOL 5 MG/ML
.5-1 INJECTION INTRAMUSCULAR
Status: DISCONTINUED | OUTPATIENT
Start: 2017-01-01 | End: 2017-01-01 | Stop reason: HOSPADM

## 2017-01-01 RX ORDER — MINERAL OIL/HYDROPHIL PETROLAT
OINTMENT (GRAM) TOPICAL EVERY 8 HOURS PRN
Status: DISCONTINUED | OUTPATIENT
Start: 2017-01-01 | End: 2017-01-01 | Stop reason: HOSPADM

## 2017-01-01 RX ORDER — CARBOXYMETHYLCELLULOSE SODIUM 5 MG/ML
1 SOLUTION/ DROPS OPHTHALMIC
Status: DISCONTINUED | OUTPATIENT
Start: 2017-01-01 | End: 2017-01-01

## 2017-01-01 RX ORDER — LORAZEPAM 2 MG/ML
.5-1 INJECTION INTRAMUSCULAR
Status: DISCONTINUED | OUTPATIENT
Start: 2017-01-01 | End: 2017-01-01 | Stop reason: HOSPADM

## 2017-01-01 RX ORDER — POLYETHYLENE GLYCOL 3350 17 G/17G
17 POWDER, FOR SOLUTION ORAL EVERY OTHER DAY
Status: DISCONTINUED | OUTPATIENT
Start: 2017-01-01 | End: 2017-01-01

## 2017-01-01 RX ORDER — HYDROMORPHONE HYDROCHLORIDE 1 MG/ML
0.2 INJECTION, SOLUTION INTRAMUSCULAR; INTRAVENOUS; SUBCUTANEOUS
Status: DISCONTINUED | OUTPATIENT
Start: 2017-01-01 | End: 2017-01-01

## 2017-01-01 RX ADMIN — GABAPENTIN 400 MG: 400 CAPSULE ORAL at 21:26

## 2017-01-01 RX ADMIN — VITAMIN D, TAB 1000IU (100/BT) 1000 UNITS: 25 TAB at 09:24

## 2017-01-01 RX ADMIN — FUROSEMIDE 20 MG: 20 TABLET ORAL at 12:00

## 2017-01-01 RX ADMIN — NITROGLYCERIN 0.4 MG: 0.4 TABLET SUBLINGUAL at 16:29

## 2017-01-01 RX ADMIN — TICAGRELOR 180 MG: 90 TABLET ORAL at 00:08

## 2017-01-01 RX ADMIN — TRAMADOL HYDROCHLORIDE 50 MG: 50 TABLET, COATED ORAL at 07:56

## 2017-01-01 RX ADMIN — ATORVASTATIN CALCIUM 80 MG: 40 TABLET, FILM COATED ORAL at 18:24

## 2017-01-01 RX ADMIN — CARVEDILOL 6.25 MG: 3.12 TABLET, FILM COATED ORAL at 19:57

## 2017-01-01 RX ADMIN — LEVOFLOXACIN 250 MG: 5 INJECTION, SOLUTION INTRAVENOUS at 12:42

## 2017-01-01 RX ADMIN — CARVEDILOL 6.25 MG: 3.12 TABLET, FILM COATED ORAL at 10:51

## 2017-01-01 RX ADMIN — FUROSEMIDE 40 MG: 20 TABLET ORAL at 09:25

## 2017-01-01 RX ADMIN — HYDROMORPHONE HYDROCHLORIDE 0.2 MG: 1 INJECTION, SOLUTION INTRAMUSCULAR; INTRAVENOUS; SUBCUTANEOUS at 19:33

## 2017-01-01 RX ADMIN — VITAMIN D, TAB 1000IU (100/BT) 1000 UNITS: 25 TAB at 10:25

## 2017-01-01 RX ADMIN — ASPIRIN 81 MG: 81 TABLET, COATED ORAL at 08:09

## 2017-01-01 RX ADMIN — VITAMIN D, TAB 1000IU (100/BT) 1000 UNITS: 25 TAB at 10:52

## 2017-01-01 RX ADMIN — MIRTAZAPINE 30 MG: 30 TABLET, FILM COATED ORAL at 21:26

## 2017-01-01 RX ADMIN — SIMVASTATIN 20 MG: 20 TABLET, FILM COATED ORAL at 22:05

## 2017-01-01 RX ADMIN — LEVOFLOXACIN 500 MG: 5 INJECTION, SOLUTION INTRAVENOUS at 12:32

## 2017-01-01 RX ADMIN — HEPARIN SODIUM 950 UNITS/HR: 10000 INJECTION, SOLUTION INTRAVENOUS at 17:45

## 2017-01-01 RX ADMIN — ASPIRIN 81 MG: 81 TABLET, COATED ORAL at 09:22

## 2017-01-01 RX ADMIN — MIRTAZAPINE 30 MG: 30 TABLET, FILM COATED ORAL at 21:42

## 2017-01-01 RX ADMIN — INSULIN DETEMIR 7 UNITS: 100 INJECTION, SOLUTION SUBCUTANEOUS at 21:15

## 2017-01-01 RX ADMIN — MIRTAZAPINE 30 MG: 30 TABLET, FILM COATED ORAL at 21:12

## 2017-01-01 RX ADMIN — I-VITE, TAB 1000-60-2MG (60/BT) 1 TABLET: TAB at 16:37

## 2017-01-01 RX ADMIN — HYDROMORPHONE HYDROCHLORIDE 0.2 MG: 1 INJECTION, SOLUTION INTRAMUSCULAR; INTRAVENOUS; SUBCUTANEOUS at 13:38

## 2017-01-01 RX ADMIN — INSULIN ASPART 2 UNITS: 100 INJECTION, SOLUTION INTRAVENOUS; SUBCUTANEOUS at 18:31

## 2017-01-01 RX ADMIN — I-VITE, TAB 1000-60-2MG (60/BT) 1 TABLET: TAB at 16:27

## 2017-01-01 RX ADMIN — SODIUM CHLORIDE: 9 INJECTION, SOLUTION INTRAVENOUS at 00:08

## 2017-01-01 RX ADMIN — CARVEDILOL 6.25 MG: 3.12 TABLET, FILM COATED ORAL at 10:26

## 2017-01-01 RX ADMIN — NITROGLYCERIN 0.07 MCG/KG/MIN: 20 INJECTION INTRAVENOUS at 03:03

## 2017-01-01 RX ADMIN — SIMVASTATIN 20 MG: 20 TABLET, FILM COATED ORAL at 21:26

## 2017-01-01 RX ADMIN — SODIUM CHLORIDE: 9 INJECTION, SOLUTION INTRAVENOUS at 21:01

## 2017-01-01 RX ADMIN — NITROGLYCERIN 0.4 MG: 0.4 TABLET SUBLINGUAL at 16:34

## 2017-01-01 RX ADMIN — PHENAZOPYRIDINE HYDROCHLORIDE 100 MG: 100 TABLET ORAL at 14:16

## 2017-01-01 RX ADMIN — VITAMIN D, TAB 1000IU (100/BT) 1000 UNITS: 25 TAB at 08:10

## 2017-01-01 RX ADMIN — OXYBUTYNIN CHLORIDE 10 MG: 5 TABLET ORAL at 21:43

## 2017-01-01 RX ADMIN — Medication 4000 UNITS: at 00:53

## 2017-01-01 RX ADMIN — INSULIN ASPART 1 UNITS: 100 INJECTION, SOLUTION INTRAVENOUS; SUBCUTANEOUS at 21:30

## 2017-01-01 RX ADMIN — FLUTICASONE FUROATE AND VILANTEROL TRIFENATATE 1 PUFF: 100; 25 POWDER RESPIRATORY (INHALATION) at 09:38

## 2017-01-01 RX ADMIN — HYDROMORPHONE HYDROCHLORIDE 0.2 MG: 1 INJECTION, SOLUTION INTRAMUSCULAR; INTRAVENOUS; SUBCUTANEOUS at 18:50

## 2017-01-01 RX ADMIN — GABAPENTIN 400 MG: 400 CAPSULE ORAL at 08:32

## 2017-01-01 RX ADMIN — LISINOPRIL 40 MG: 20 TABLET ORAL at 10:49

## 2017-01-01 RX ADMIN — TRAMADOL HYDROCHLORIDE 50 MG: 50 TABLET, COATED ORAL at 00:09

## 2017-01-01 RX ADMIN — INSULIN ASPART 3 UNITS: 100 INJECTION, SOLUTION INTRAVENOUS; SUBCUTANEOUS at 12:28

## 2017-01-01 RX ADMIN — CARVEDILOL 6.25 MG: 3.12 TABLET, FILM COATED ORAL at 08:09

## 2017-01-01 RX ADMIN — FLUTICASONE FUROATE AND VILANTEROL TRIFENATATE 1 PUFF: 100; 25 POWDER RESPIRATORY (INHALATION) at 10:17

## 2017-01-01 RX ADMIN — HYDROMORPHONE HYDROCHLORIDE 0.2 MG: 1 INJECTION, SOLUTION INTRAMUSCULAR; INTRAVENOUS; SUBCUTANEOUS at 21:43

## 2017-01-01 RX ADMIN — ASPIRIN 81 MG: 81 TABLET, COATED ORAL at 10:25

## 2017-01-01 RX ADMIN — GABAPENTIN 400 MG: 400 CAPSULE ORAL at 08:10

## 2017-01-01 RX ADMIN — FLUTICASONE FUROATE AND VILANTEROL TRIFENATATE 1 PUFF: 100; 25 POWDER RESPIRATORY (INHALATION) at 11:37

## 2017-01-01 RX ADMIN — OXYBUTYNIN CHLORIDE 10 MG: 5 TABLET ORAL at 22:05

## 2017-01-01 RX ADMIN — INSULIN ASPART 1 UNITS: 100 INJECTION, SOLUTION INTRAVENOUS; SUBCUTANEOUS at 16:52

## 2017-01-01 RX ADMIN — CARVEDILOL 6.25 MG: 3.12 TABLET, FILM COATED ORAL at 09:23

## 2017-01-01 RX ADMIN — CARVEDILOL 6.25 MG: 3.12 TABLET, FILM COATED ORAL at 18:23

## 2017-01-01 RX ADMIN — SENNOSIDES AND DOCUSATE SODIUM 1 TABLET: 8.6; 5 TABLET ORAL at 21:13

## 2017-01-01 RX ADMIN — I-VITE, TAB 1000-60-2MG (60/BT) 1 TABLET: TAB at 08:10

## 2017-01-01 RX ADMIN — GABAPENTIN 400 MG: 400 CAPSULE ORAL at 22:04

## 2017-01-01 RX ADMIN — GABAPENTIN 400 MG: 400 CAPSULE ORAL at 10:25

## 2017-01-01 RX ADMIN — ASPIRIN 81 MG 81 MG: 81 TABLET ORAL at 10:49

## 2017-01-01 RX ADMIN — VITAMIN D, TAB 1000IU (100/BT) 1000 UNITS: 25 TAB at 08:31

## 2017-01-01 RX ADMIN — GABAPENTIN 400 MG: 400 CAPSULE ORAL at 21:42

## 2017-01-01 RX ADMIN — FLUTICASONE FUROATE AND VILANTEROL TRIFENATATE 1 PUFF: 100; 25 POWDER RESPIRATORY (INHALATION) at 08:34

## 2017-01-01 RX ADMIN — OMEPRAZOLE 20 MG: 20 CAPSULE, DELAYED RELEASE ORAL at 18:32

## 2017-01-01 RX ADMIN — INSULIN DETEMIR 7 UNITS: 100 INJECTION, SOLUTION SUBCUTANEOUS at 22:08

## 2017-01-01 RX ADMIN — NITROGLYCERIN 0.07 MCG/KG/MIN: 20 INJECTION INTRAVENOUS at 17:00

## 2017-01-01 RX ADMIN — SODIUM CHLORIDE 500 ML: 9 INJECTION, SOLUTION INTRAVENOUS at 19:54

## 2017-01-01 RX ADMIN — INSULIN ASPART 1 UNITS: 100 INJECTION, SOLUTION INTRAVENOUS; SUBCUTANEOUS at 22:10

## 2017-01-01 RX ADMIN — POLYETHYLENE GLYCOL 3350 17 G: 17 POWDER, FOR SOLUTION ORAL at 09:15

## 2017-01-01 RX ADMIN — SENNOSIDES AND DOCUSATE SODIUM 1 TABLET: 8.6; 5 TABLET ORAL at 10:26

## 2017-01-01 RX ADMIN — HEPARIN SODIUM 650 UNITS/HR: 10000 INJECTION, SOLUTION INTRAVENOUS at 02:24

## 2017-01-01 RX ADMIN — NITROGLYCERIN 50000 MCG: 20 INJECTION INTRAVENOUS at 17:00

## 2017-01-01 RX ADMIN — NITROGLYCERIN 0.4 MG: 0.4 TABLET SUBLINGUAL at 16:42

## 2017-01-01 RX ADMIN — SODIUM CHLORIDE: 9 INJECTION, SOLUTION INTRAVENOUS at 06:45

## 2017-01-01 RX ADMIN — TICAGRELOR 90 MG: 90 TABLET ORAL at 10:25

## 2017-01-01 RX ADMIN — INSULIN ASPART 1 UNITS: 100 INJECTION, SOLUTION INTRAVENOUS; SUBCUTANEOUS at 08:17

## 2017-01-01 RX ADMIN — FUROSEMIDE 40 MG: 20 TABLET ORAL at 08:32

## 2017-01-01 RX ADMIN — OXYBUTYNIN CHLORIDE 10 MG: 5 TABLET ORAL at 21:26

## 2017-01-01 RX ADMIN — CLOPIDOGREL 75 MG: 75 TABLET, FILM COATED ORAL at 21:12

## 2017-01-01 RX ADMIN — CARVEDILOL 6.25 MG: 3.12 TABLET, FILM COATED ORAL at 16:37

## 2017-01-01 RX ADMIN — SIMVASTATIN 20 MG: 20 TABLET, FILM COATED ORAL at 21:42

## 2017-01-01 RX ADMIN — ENOXAPARIN SODIUM 30 MG: 30 INJECTION SUBCUTANEOUS at 16:35

## 2017-01-01 RX ADMIN — I-VITE, TAB 1000-60-2MG (60/BT) 1 TABLET: TAB at 20:22

## 2017-01-01 RX ADMIN — INSULIN ASPART 1 UNITS: 100 INJECTION, SOLUTION INTRAVENOUS; SUBCUTANEOUS at 11:55

## 2017-01-01 RX ADMIN — I-VITE, TAB 1000-60-2MG (60/BT) 1 TABLET: TAB at 08:32

## 2017-01-01 RX ADMIN — AMLODIPINE BESYLATE 7.5 MG: 5 TABLET ORAL at 08:11

## 2017-01-01 RX ADMIN — LISINOPRIL 40 MG: 20 TABLET ORAL at 11:59

## 2017-01-01 RX ADMIN — HEPARIN SODIUM 4000 UNITS: 10000 INJECTION, SOLUTION INTRAVENOUS at 00:53

## 2017-01-01 RX ADMIN — HEPARIN SODIUM 1000 UNITS/HR: 10000 INJECTION, SOLUTION INTRAVENOUS at 00:29

## 2017-01-01 RX ADMIN — ENOXAPARIN SODIUM 30 MG: 30 INJECTION SUBCUTANEOUS at 16:27

## 2017-01-01 RX ADMIN — HYDROMORPHONE HYDROCHLORIDE 0.2 MG: 1 INJECTION, SOLUTION INTRAMUSCULAR; INTRAVENOUS; SUBCUTANEOUS at 11:23

## 2017-01-01 RX ADMIN — OXYBUTYNIN CHLORIDE 10 MG: 5 TABLET ORAL at 14:16

## 2017-01-01 RX ADMIN — ENOXAPARIN SODIUM 30 MG: 30 INJECTION SUBCUTANEOUS at 16:37

## 2017-01-01 RX ADMIN — HYDROMORPHONE HYDROCHLORIDE 0.2 MG: 1 INJECTION, SOLUTION INTRAMUSCULAR; INTRAVENOUS; SUBCUTANEOUS at 15:59

## 2017-01-01 RX ADMIN — ACETAMINOPHEN 325 MG: 325 TABLET, FILM COATED ORAL at 12:40

## 2017-01-01 RX ADMIN — AMLODIPINE BESYLATE 7.5 MG: 5 TABLET ORAL at 09:26

## 2017-01-01 RX ADMIN — CARVEDILOL 6.25 MG: 3.12 TABLET, FILM COATED ORAL at 18:59

## 2017-01-01 RX ADMIN — INSULIN ASPART 2 UNITS: 100 INJECTION, SOLUTION INTRAVENOUS; SUBCUTANEOUS at 12:20

## 2017-01-01 RX ADMIN — HEPARIN SODIUM 4500 UNITS: 1000 INJECTION, SOLUTION INTRAVENOUS; SUBCUTANEOUS at 10:24

## 2017-01-01 RX ADMIN — OXYBUTYNIN CHLORIDE 10 MG: 5 TABLET ORAL at 08:10

## 2017-01-01 RX ADMIN — HYDROMORPHONE HYDROCHLORIDE 0.4 MG: 1 INJECTION, SOLUTION INTRAMUSCULAR; INTRAVENOUS; SUBCUTANEOUS at 22:42

## 2017-01-01 RX ADMIN — INSULIN ASPART 2 UNITS: 100 INJECTION, SOLUTION INTRAVENOUS; SUBCUTANEOUS at 08:46

## 2017-01-01 RX ADMIN — GABAPENTIN 400 MG: 400 CAPSULE ORAL at 21:12

## 2017-01-01 RX ADMIN — MIRTAZAPINE 30 MG: 30 TABLET, FILM COATED ORAL at 22:04

## 2017-01-01 RX ADMIN — INSULIN ASPART 1 UNITS: 100 INJECTION, SOLUTION INTRAVENOUS; SUBCUTANEOUS at 08:30

## 2017-01-01 RX ADMIN — AMLODIPINE BESYLATE 7.5 MG: 5 TABLET ORAL at 08:35

## 2017-01-01 RX ADMIN — LEVOFLOXACIN 250 MG: 5 INJECTION, SOLUTION INTRAVENOUS at 13:39

## 2017-01-01 RX ADMIN — HYDROMORPHONE HYDROCHLORIDE 0.2 MG: 1 INJECTION, SOLUTION INTRAMUSCULAR; INTRAVENOUS; SUBCUTANEOUS at 06:57

## 2017-01-01 RX ADMIN — IBUPROFEN 600 MG: 600 TABLET ORAL at 09:34

## 2017-01-01 RX ADMIN — CARVEDILOL 6.25 MG: 3.12 TABLET, FILM COATED ORAL at 08:32

## 2017-01-01 RX ADMIN — FUROSEMIDE 40 MG: 20 TABLET ORAL at 08:09

## 2017-01-01 RX ADMIN — DEXTRAN 70, AND HYPROMELLOSE 2910 1 DROP: 1; 3 SOLUTION/ DROPS OPHTHALMIC at 10:26

## 2017-01-01 RX ADMIN — I-VITE, TAB 1000-60-2MG (60/BT) 1 TABLET: TAB at 12:00

## 2017-01-01 RX ADMIN — TRAMADOL HYDROCHLORIDE 50 MG: 50 TABLET, COATED ORAL at 21:15

## 2017-01-01 RX ADMIN — GABAPENTIN 400 MG: 400 CAPSULE ORAL at 09:25

## 2017-01-01 RX ADMIN — INSULIN ASPART 1 UNITS: 100 INJECTION, SOLUTION INTRAVENOUS; SUBCUTANEOUS at 16:32

## 2017-01-01 RX ADMIN — LORAZEPAM 0.5 MG: 0.5 TABLET ORAL at 21:15

## 2017-01-01 RX ADMIN — INSULIN DETEMIR 7 UNITS: 100 INJECTION, SOLUTION SUBCUTANEOUS at 21:47

## 2017-01-01 RX ADMIN — SODIUM CHLORIDE: 9 INJECTION, SOLUTION INTRAVENOUS at 10:03

## 2017-01-01 RX ADMIN — ACETAMINOPHEN 975 MG: 325 TABLET, FILM COATED ORAL at 12:45

## 2017-01-01 RX ADMIN — TETRACAINE HYDROCHLORIDE 2 DROP: 5 SOLUTION OPHTHALMIC at 11:15

## 2017-01-01 RX ADMIN — ASPIRIN 81 MG: 81 TABLET, COATED ORAL at 08:32

## 2017-01-01 RX ADMIN — I-VITE, TAB 1000-60-2MG (60/BT) 1 TABLET: TAB at 09:37

## 2017-01-01 RX ADMIN — FUROSEMIDE 40 MG: 20 TABLET ORAL at 10:51

## 2017-01-01 RX ADMIN — POLYETHYLENE GLYCOL 3350 17 G: 17 POWDER, FOR SOLUTION ORAL at 08:12

## 2017-01-01 ASSESSMENT — ENCOUNTER SYMPTOMS
COUGH: 1
CHILLS: 0
FEVER: 0
DIFFICULTY URINATING: 0
HEADACHES: 0
EYE DISCHARGE: 1
EYE PAIN: 1
CONFUSION: 0
EYE PAIN: 1
PHOTOPHOBIA: 1
PHOTOPHOBIA: 0
EYE REDNESS: 1
EYE REDNESS: 0
COLOR CHANGE: 0
FATIGUE: 0
ARTHRALGIAS: 0
EYE ITCHING: 0
NECK STIFFNESS: 0
SHORTNESS OF BREATH: 0
FEVER: 0
APPETITE CHANGE: 0
ABDOMINAL PAIN: 0

## 2017-01-01 ASSESSMENT — PAIN DESCRIPTION - DESCRIPTORS
DESCRIPTORS: SHARP
DESCRIPTORS: ACHING
DESCRIPTORS: SHARP;HEAVINESS
DESCRIPTORS: SHARP

## 2017-01-01 ASSESSMENT — ACTIVITIES OF DAILY LIVING (ADL)
TOILETING: 1-->ASSISTIVE EQUIPMENT
SWALLOWING: 0-->SWALLOWS FOODS/LIQUIDS WITHOUT DIFFICULTY
AMBULATION: 1-->ASSISTIVE EQUIPMENT
BATHING: 2-->ASSISTIVE PERSON
DRESS: 2-->ASSISTIVE PERSON
BATHING: 2-->ASSISTIVE PERSON
COMMUNICATION: 0-->UNDERSTANDS/COMMUNICATES WITHOUT DIFFICULTY
DRESS: 2-->ASSISTIVE PERSON
TRANSFERRING: 1-->ASSISTIVE EQUIPMENT
TRANSFERRING: 1-->ASSISTIVE EQUIPMENT
SWALLOWING: 0-->SWALLOWS FOODS/LIQUIDS WITHOUT DIFFICULTY
FALL_HISTORY_WITHIN_LAST_SIX_MONTHS: NO
RETIRED_COMMUNICATION: 0-->UNDERSTANDS/COMMUNICATES WITHOUT DIFFICULTY
AMBULATION: 1-->ASSISTIVE EQUIPMENT
EATING: 0-->INDEPENDENT
RETIRED_EATING: 0-->INDEPENDENT
TOILETING: 1-->ASSISTIVE EQUIPMENT
COGNITION: 1 - ATTENTION OR MEMORY DEFICITS

## 2017-01-31 PROBLEM — Z71.89 ADVANCE CARE PLANNING: Chronic | Status: ACTIVE | Noted: 2017-01-01

## 2017-04-24 NOTE — ED PROVIDER NOTES
History     Chief Complaint   Patient presents with     Eye Pain     right eye started with redness last Thursday. Pain has increased. Hx of macular degeneration--poor vision but can see with glasses.     The history is provided by the patient and a relative. No  was used.     Louise Johnson is a 88 year old female who presents today with her son (Herb) this morning with a CC of right eye redness x 4 days, she notes that eye pain that she explains as burning and boring pain started 2 days ago.  The pain has been increasing daily.  She has a history of macular degeneration with slow degeneration in vision.  She denies an acute change in vision with the redness.  She denies pain with eyeball movement.  She denies chills and fever.  She is currently using OTC lubricating drops which she uses several times per day.  She denies FB sensation.   She has a history of cataract surgery bilaterally.  She sees Dr Phan for her macular degeneration.      I have reviewed the Medications, Allergies, Past Medical and Surgical History, and Social History in the Epic system.    Review of Systems   Constitutional: Negative for appetite change, chills, fatigue and fever.   Eyes: Positive for pain, discharge (mattering) and visual disturbance (macular degeneration - no acute changes). Negative for photophobia, redness and itching.       Physical Exam   BP: 105/60  Pulse: 78  Temp: 97.3  F (36.3  C)  Resp: 20  SpO2: 94 %  Physical Exam   Constitutional: She appears well-developed and well-nourished.   HENT:   Head: Normocephalic and atraumatic.   Eyes: EOM are normal. Right eye exhibits exudate (was able to milk a small amount of exudate out right tear duct by milking the lacrimal sac). Right eye exhibits no hordeolum. Right conjunctiva is not injected. Right conjunctiva has no hemorrhage. Left conjunctiva is not injected. Left conjunctiva has no hemorrhage.   Right lower lid is mildly erythematous and painful to  palpation.   Cardiovascular: Normal rate.    Pulmonary/Chest: Effort normal.   Neurological: She is alert.   Nursing note and vitals reviewed.      ED Course     ED Course     Procedures      Assessments & Plan (with Medical Decision Making)     I have reviewed the nursing notes.    I have reviewed the findings, diagnosis, plan and need for follow up with the patient.  Consulted with Dr Amin at [facility], he recommends Maxitrol 1 drop 4 times daily x 2 weeks and follow up with Dr Phan in 2 weeks, sooner is symptoms do not improve    ASSESSMENT / PLAN:  (H04.301) Dacrocystitis, right  Comment:   Plan:  Plan as above   Discussed with patient and son and both verbalized understanding of discharge instructions        Discharge Medication List as of 4/24/2017 10:28 AM      START taking these medications    Details   neomycin-polymixin-dexamethasone (MAXITROL) ophthalmic suspension Apply 1 drop to eye 4 times daily for 14 days, Disp-1 Bottle, R-1, E-Prescribe             Final diagnoses:   Dacrocystitis, right       4/24/2017   [facility]     Dana Garcia NP  04/27/17 [id number]

## 2017-04-24 NOTE — ED NOTES
Pt presents with son with hx of red right eye. Right eye has burning pain, red, and states has drainage. Pt got a pain pill before leaving the assisted living this am. Son states pt has hx of macular degeneration and cannot recognize people till they are within 4-5 feet. Visual acuity deferred due to this. States can read large print with glasses for short periods of time. Assisted living had been applying warm compresses since sx began.

## 2017-04-24 NOTE — ED AVS SNAPSHOT
[facility]    [address] [address]    Phone:  [phone]                                       Louise Johnson   MRN: [id number]    Department:  [facility]   Date of Visit:  4/24/2017           After Visit Summary Signature Page     I have received my discharge instructions, and my questions have been answered. I have discussed any challenges I see with this plan with the nurse or doctor.    ..........................................................................................................................................  Patient/Patient Representative Signature      ..........................................................................................................................................  Patient Representative Print Name and Relationship to Patient    ..................................................               ................................................  Date                                            Time    ..........................................................................................................................................  Reviewed by Signature/Title    ...................................................              ..............................................  Date                                                            Time

## 2017-04-24 NOTE — DISCHARGE INSTRUCTIONS
Infected Tear Sac (Dacryocystitis)    Dacryocystitis is an infection of the tear sac. The tear sac is the narrow pouch where tears from the eye drain before they flow into the nose. You have 1 tear sac for each eye.  Tears moisten and clean the eyes. They are made in a gland under the upper eyelids. Tiny tubes called tear ducts drain excess tears away from the eyes. The tears flow into the tear sacs, and then into the nose.  In some cases, a tear sac can get infected. This can happen if the tear duct is blocked. A blocked duct can happen for many reasons. It may be caused by an injury to the nose or eye. Or the duct may have narrowed on its own. A blocked tear duct can t drain tears. Bacteria may then become trapped in the duct and in the tear sac.  Dacryocystitis can cause redness, swelling, and pain just below the lower lid, near the nose. A fluid (pus) may drain from the eye. Antibiotics are used to treat the infection and stop it from spreading. If the infection doesn t go away, or comes back often, minor surgery may be needed to open the duct.  Home care  Follow these guidelines when caring for yourself at home:    You will be given antibiotic medicine to treat the infection. Follow all instructions for taking this medication. It may be taken by mouth. Or it may be eye drops or eye ointment. If you have pain, you may use acetaminophen or ibuprofen to reduce pain and fever. (Note: If you have chronic liver or kidney disease, or you have ever had a stomach ulcer or gastrointestinal bleeding, talk with your health care provider before using these medicines.)    Wash your hands before caring for your eye.    Several times a day, apply a warm compress for 1 to 2 minutes. A warm compress is a clean towel damp with warm water.    After the warm compress, massage the tear sac with clean hands. Place your index finger between the corner of the eye and the nose. Your finger should be pointing toward the top of the  nose. Gently massage from the nose toward the eye. A small amount of tear fluid or pus may appear in the corner of the eye.  Using eye drops  Apply drops in the corner of the eye, where the eyelid meets the nose. The drops will pool in this area. When you blink or open your eyelid, the drops will flow into the eye. Use the exact number of drops prescribed. Be careful not to touch your eye or eyelashes with the dropper.  Using ointment  If both drops and ointment are prescribed, use the drops first. Wait 3 minutes, and then apply the ointment. Doing this will give each medicine time to work. To apply the ointment, start by gently pulling down your lower lid. Place a thin line of ointment along the inside of the lid. Begin at the nose and move outward. Close the lid. Wipe away excess medicine from the nose area outward. This is to keep the eyes as clean as possible. Keep your eye closed for 1 or 2 minutes so the medicine has time to coat the eye. Eye ointment may cause blurry vision. This is normal. It may help to apply ointment at bedtime instead of during the day.  Follow-up care  Follow up with your health care provider, or as advised.  When to seek medical advice  Call your health care provider right away if any of these occur.    Fever of 100.4 F (38 C) or higher after 2 days of antibiotic treatment    You are pregnant    You just had surgery or another medical procedure, or were just discharged from the hospital    Symptoms don t get better, or get worse    2051-6709 [employer]. [address] [place] [address], [place] [address]. All rights reserved. This information is not intended as a substitute for professional medical care. Always follow your healthcare professional's instructions.

## 2017-04-24 NOTE — ED AVS SNAPSHOT
[facility]    [address] [address]    Phone:  [phone]                                       Louise Johnson   MRN: [id number]    Department:  [facility]   Date of Visit:  4/24/2017           Patient Information     Date Of Birth          4/16/1929        Your diagnoses for this visit were:     Dacrocystitis, right        You were seen by Dana Garcia NP.      Follow-up Information     Follow up with [facility].    Specialty:  EMERGENCY MEDICINE    Why:  As needed, If symptoms worsen, or concerns develop    Contact information:    [address] [address] [phone]    Additional information:    From [place]: Take [address]. Turn left at [address]. Turn left at the first stoplight on [address]. At the first stop sign, take a right onto [address]. Take a left into the parking lot and continue through until you reach the North enterance of the building.       From [place]: Take [address]. Take the [address] ramp towards [place]. Turn left onto [address]. Take a slight right onto [address]. Turn left at the first stoplight on [address]. At the first stop sign, take a right onto [address]. Take a left into the parking lot and continue through until you reach the North enterance of the building.       From [place]: Take [address]. Take a right at [address]. At the first stop sign, take a right onto [address]. Take a left into the parking lot and continue through until you reach the North enterance of the building.         Follow up with Pablo Phan MD. Call on 4/24/2017.    Why:  to schedule an appointment for follow up in 2 weeks    Contact information:    [facility]  [address]  [facility] [address]  [phone]          Discharge Instructions         Infected Tear Sac  (Dacryocystitis)    Dacryocystitis is an infection of the tear sac. The tear sac is the narrow pouch where tears from the eye drain before they flow into the nose. You have 1 tear sac for each eye.  Tears moisten and clean the eyes. They are made in a gland under the upper eyelids. Tiny tubes called tear ducts drain excess tears away from the eyes. The tears flow into the tear sacs, and then into the nose.  In some cases, a tear sac can get infected. This can happen if the tear duct is blocked. A blocked duct can happen for many reasons. It may be caused by an injury to the nose or eye. Or the duct may have narrowed on its own. A blocked tear duct can t drain tears. Bacteria may then become trapped in the duct and in the tear sac.  Dacryocystitis can cause redness, swelling, and pain just below the lower lid, near the nose. A fluid (pus) may drain from the eye. Antibiotics are used to treat the infection and stop it from spreading. If the infection doesn t go away, or comes back often, minor surgery may be needed to open the duct.  Home care  Follow these guidelines when caring for yourself at home:    You will be given antibiotic medicine to treat the infection. Follow all instructions for taking this medication. It may be taken by mouth. Or it may be eye drops or eye ointment. If you have pain, you may use acetaminophen or ibuprofen to reduce pain and fever. (Note: If you have chronic liver or kidney disease, or you have ever had a stomach ulcer or gastrointestinal bleeding, talk with your health care provider before using these medicines.)    Wash your hands before caring for your eye.    Several times a day, apply a warm compress for 1 to 2 minutes. A warm compress is a clean towel damp with warm water.    After the warm compress, massage the tear sac with clean hands. Place your index finger between the corner of the eye and the nose. Your finger should be pointing toward the top of the nose. Gently massage  from the nose toward the eye. A small amount of tear fluid or pus may appear in the corner of the eye.  Using eye drops  Apply drops in the corner of the eye, where the eyelid meets the nose. The drops will pool in this area. When you blink or open your eyelid, the drops will flow into the eye. Use the exact number of drops prescribed. Be careful not to touch your eye or eyelashes with the dropper.  Using ointment  If both drops and ointment are prescribed, use the drops first. Wait 3 minutes, and then apply the ointment. Doing this will give each medicine time to work. To apply the ointment, start by gently pulling down your lower lid. Place a thin line of ointment along the inside of the lid. Begin at the nose and move outward. Close the lid. Wipe away excess medicine from the nose area outward. This is to keep the eyes as clean as possible. Keep your eye closed for 1 or 2 minutes so the medicine has time to coat the eye. Eye ointment may cause blurry vision. This is normal. It may help to apply ointment at bedtime instead of during the day.  Follow-up care  Follow up with your health care provider, or as advised.  When to seek medical advice  Call your health care provider right away if any of these occur.    Fever of 100.4 F (38 C) or higher after 2 days of antibiotic treatment    You are pregnant    You just had surgery or another medical procedure, or were just discharged from the hospital    Symptoms don t get better, or get worse    5754-8326 [employer]. [address] [place] [address], [place] [address]. All rights reserved. This information is not intended as a substitute for professional medical care. Always follow your healthcare professional's instructions.             Review of your medicines      START taking        Dose / Directions Last dose taken    neomycin-polymixin-dexamethasone ophthalmic suspension   Commonly known as:  MAXITROL   Dose:  1 drop   Quantity:  1 Bottle        Apply 1 drop  to eye 4 times daily for 14 days   Refills:  1          Our records show that you are taking the medicines listed below. If these are incorrect, please call your family doctor or clinic.        Dose / Directions Last dose taken    amLODIPine 5 MG tablet   Commonly known as:  NORVASC   Dose:  5 mg   Quantity:  30 tablet        Take 1 tablet (5 mg) by mouth daily   Refills:  1        ASPIRIN ADULT LOW STRENGTH PO   Dose:  81 mg   Indication:  ecotrim        Take 81 mg by mouth daily   Refills:  0        CARVEDILOL PO   Dose:  6.25 mg        Take 6.25 mg by mouth 2 times daily (with meals)   Refills:  0        fluticasone-salmeterol 250-50 MCG/DOSE diskus inhaler   Commonly known as:  ADVAIR   Dose:  1 puff        Inhale 1 puff into the lungs 2 times daily   Refills:  0        GABAPENTIN PO   Dose:  300 mg        Take 300 mg by mouth At Bedtime   Refills:  0        LEVEMIR SC   Dose:  4 Units        Inject 4 Units Subcutaneous At Bedtime   Refills:  0        LISINOPRIL PO   Dose:  40 mg        Take 40 mg by mouth daily   Refills:  0        METFORMIN HCL PO   Dose:  1000 mg        Take 1,000 mg by mouth daily   Refills:  0        MIRTAZAPINE PO   Dose:  30 mg        Take 30 mg by mouth At Bedtime   Refills:  0        multivitamin Tabs tablet   Dose:  1 tablet        Take 1 tablet by mouth 2 times daily   Refills:  0        NITROGLYCERIN SL   Dose:  0.4 mg        Place 0.4 mg under the tongue every 5 minutes as needed for chest pain x3 prn   Refills:  0        OXYBUTYNIN CHLORIDE PO   Dose:  10 mg        Take 10 mg by mouth At Bedtime   Refills:  0        oxyCODONE-acetaminophen 5-325 MG per tablet   Commonly known as:  PERCOCET   Dose:  1 tablet   Quantity:  30 tablet        Take 1 tablet by mouth every 4 hours as needed for moderate to severe pain   Refills:  0        polyethylene glycol powder   Commonly known as:  MIRALAX/GLYCOLAX   Dose:  1 capful        Take 1 capful by mouth as needed for constipation   Refills:   "0        terbinafine 250 MG tablet   Commonly known as:  lamISIL   Dose:  250 mg        Take 250 mg by mouth daily   Refills:  0        TRAMADOL HCL PO   Dose:  25 mg        Take 25 mg by mouth every 6 hours as needed for moderate to severe pain   Refills:  0        TYLENOL PO   Dose:  500 mg        Take 500 mg by mouth every 6 hours as needed for mild pain or fever   Refills:  0        VITAMIN D (CHOLECALCIFEROL) PO   Dose:  1000 Units        Take 1,000 Units by mouth daily   Refills:  0        ZOCOR PO   Dose:  20 mg        Take 20 mg by mouth At Bedtime   Refills:  0                Prescriptions were sent or printed at these locations (1 Prescription)                   [id number] - [place], [place] - [address]   [address] [place] [address]    Telephone:  [phone]   Fax:  [phone]   Hours:                  E-Prescribed (1 of 1)         neomycin-polymixin-dexamethasone (MAXITROL) ophthalmic suspension                Orders Needing Specimen Collection     None      Pending Results     No orders found from 4/22/2017 to 4/25/2017.            Pending Culture Results     No orders found from 4/22/2017 to 4/25/2017.            Thank you for choosing [facility]       Thank you for choosing [facility] for your care. Our goal is always to provide you with excellent care. Hearing back from our patients is one way we can continue to improve our services. Please take a few minutes to complete the written survey that you may receive in the mail after you visit with us. Thank you!        [employer] Information     [employer] lets you send messages to your doctor, view your test results, renew your prescriptions, schedule appointments and more. To sign up, go to [email or web address] . Click on \"Log in\" on the left side of the screen, which will take you to the Welcome page. Then click on \"Sign up Now\" on the right side of the page.     You will be asked to enter the access code listed below, as well as " some personal information. Please follow the directions to create your username and password.     Your access code is: [email or web address]  Expires: 2017 10:28 AM     Your access code will  in 90 days. If you need help or a new code, please call your [facility] clinic or [phone].        Care EveryWhere ID     This is your Care EveryWhere ID. This could be used by other organizations to access your [facility] medical records  [id number]        After Visit Summary       This is your record. Keep this with you and show to your community pharmacist(s) and doctor(s) at your next visit.

## 2017-04-24 NOTE — ED NOTES
Pt presents to urgent care with her son from assisted living in [place]. Thursday R eye started to get blood shot. Eye has been puffy and and has progressively become more painful. She also complains of the R eye itching and burning.

## 2017-05-14 NOTE — ED AVS SNAPSHOT
[facility]    [address] [address]    Phone:  [phone]                                       Louise Johnson   MRN: [id number]    Department:  [facility]   Date of Visit:  5/14/2017           Patient Information     Date Of Birth          4/16/1929        Your diagnoses for this visit were:     Corneal abrasion, right, initial encounter        You were seen by Malena Alicea APRN FNP.      Follow-up Information     Follow up with Pablo Phan MD In 1 day.    Why:  if not improving or back to baseline    Contact information:    [facility]  [address]  [facility]ly [facility] [id number] [phone]          Follow up with [facility].    Specialty:  EMERGENCY MEDICINE    Why:  As needed, If symptoms worsen    Contact information:    [address] [address] [phone]    Additional information:    From [place]: Take [address]. Turn left at [address]. Turn left at the first stoplight on [address]. At the first stop sign, take a right onto [address]. Take a left into the parking lot and continue through until you reach the North enterance of the building.       From [place]: Take [address]. Take the [address] ramp towards [place]. Turn left onto [address]. Take a slight right onto [address]. Turn left at the first stoplight on [address]. At the first stop sign, take a right onto [address]. Take a left into the parking lot and continue through until you reach the North enterance of the building.       From [place]: Take [address]. Take a right at [address]. At the first stop sign, take a right onto [address]. Take a left into the parking lot and continue through until you reach the North enterance of the building.         Discharge Instructions       See attached for home care  Apply drops as prescribed  Appointment  with Dr. Phan tomorrow if not improving or back to baseline  Return to ED with worsening sx.     Corneal Abrasion    You have received a scratch or scrape (abrasion) to your cornea. The cornea is the clear part in the front of the eye. This sensitive area is very painful when injured. You may make tears frequently, and your vision may be blurry until the injury heals. You may be sensitive to light.  This part of the body heals quickly. You can expect the pain to go away within 24 to 48 hours. If the abrasion is large or deep, your doctor may apply an eye patch, although this is not always done. An antibiotic ointment or eye drops may also be used to prevent infection.  Numbing drops may be used to relieve the pain temporarily so that your eyes can be examined. However, these drops cannot be prescribed for home use because that would prevent healing and lead to more serious problems. Also, if you can t feel your eye, there is a chance of accidentally injuring it further without knowing it.  Home care     A cold pack (ice in a plastic bag, wrapped in a towel) may be applied over the eye (or eye patch) for 20 minutes at a time, to reduce pain.    You may use acetaminophen or ibuprofen to control pain, unless another pain medicine was prescribed. Note: If you have chronic liver or kidney disease or ever had a stomach ulcer or GI bleeding, talk with your doctor before using these medicines.    Rest your eyes and do not read until symptoms are gone.    If you use contact lenses, do not wear them until all symptoms are gone.    If your vision is affected by the corneal abrasion or if an eye patch was applied, do not drive a motor vehicle or operate machinery until all symptoms are gone. You may have trouble judging distances using only one eye.    If your eyes are sensitive to light, try wearing sunglasses, or stay indoors until symptoms go away.  Follow-up care  Follow up with your health care provider, or as  advised.    If no patch was put on your eye, and used but the pain continues for more than 48 hours, you should have another exam. Return to this facility or contact your health care provider to arrange this.    If your eye was patched and you were asked to remove the patch yourself, see your health care provider. You may also return to this facility if you still have pain after the patch is removed.    If you were given a return appointment for patch removal and re-examination, be sure to keep the appointment. Leaving the patch in place longer than advised could be harmful.  When to seek medical advice  Call your health care provider right away if any of these occur.    Increasing eye pain or pain that does not improve after 24 hours    Discharge from the eye    Increasing redness of the eye or swelling of the eyelids    Worsening vision     Symptoms that worsen after the abrasion has healed     9551-1921 [employer]. [address] [place] [address]. All rights reserved. This information is not intended as a substitute for professional medical care. Always follow your healthcare professional's instructions.             Review of your medicines      START taking        Dose / Directions Last dose taken    trimethoprim-polymyxin b ophthalmic solution   Commonly known as:  POLYTRIM   Dose:  1 drop   Quantity:  1 Bottle        Apply 1 drop to eye every 4 hours for 7 days   Refills:  0          Our records show that you are taking the medicines listed below. If these are incorrect, please call your family doctor or clinic.        Dose / Directions Last dose taken    amLODIPine 5 MG tablet   Commonly known as:  NORVASC   Dose:  5 mg   Quantity:  30 tablet        Take 1 tablet (5 mg) by mouth daily   Refills:  1        ASPIRIN ADULT LOW STRENGTH PO   Dose:  81 mg   Indication:  ecotrim        Take 81 mg by mouth daily   Refills:  0        CARVEDILOL PO   Dose:  6.25 mg        Take 6.25 mg by mouth  2 times daily (with meals)   Refills:  0        fluticasone-salmeterol 250-50 MCG/DOSE diskus inhaler   Commonly known as:  ADVAIR   Dose:  1 puff        Inhale 1 puff into the lungs 2 times daily   Refills:  0        GABAPENTIN PO   Dose:  300 mg        Take 300 mg by mouth At Bedtime   Refills:  0        LEVEMIR SC   Dose:  4 Units        Inject 4 Units Subcutaneous At Bedtime   Refills:  0        LISINOPRIL PO   Dose:  40 mg        Take 40 mg by mouth daily   Refills:  0        METFORMIN HCL PO   Dose:  1000 mg        Take 1,000 mg by mouth daily   Refills:  0        MIRTAZAPINE PO   Dose:  30 mg        Take 30 mg by mouth At Bedtime   Refills:  0        multivitamin Tabs tablet   Dose:  1 tablet        Take 1 tablet by mouth 2 times daily   Refills:  0        NITROGLYCERIN SL   Dose:  0.4 mg        Place 0.4 mg under the tongue every 5 minutes as needed for chest pain x3 prn   Refills:  0        OXYBUTYNIN CHLORIDE PO   Dose:  10 mg        Take 10 mg by mouth At Bedtime   Refills:  0        oxyCODONE-acetaminophen 5-325 MG per tablet   Commonly known as:  PERCOCET   Dose:  1 tablet   Quantity:  30 tablet        Take 1 tablet by mouth every 4 hours as needed for moderate to severe pain   Refills:  0        polyethylene glycol powder   Commonly known as:  MIRALAX/GLYCOLAX   Dose:  1 capful        Take 1 capful by mouth as needed for constipation   Refills:  0        terbinafine 250 MG tablet   Commonly known as:  lamISIL   Dose:  250 mg        Take 250 mg by mouth daily   Refills:  0        TRAMADOL HCL PO   Dose:  25 mg        Take 25 mg by mouth every 6 hours as needed for moderate to severe pain   Refills:  0        TYLENOL PO   Dose:  500 mg        Take 500 mg by mouth every 6 hours as needed for mild pain or fever   Refills:  0        VITAMIN D (CHOLECALCIFEROL) PO   Dose:  1000 Units        Take 1,000 Units by mouth daily   Refills:  0        ZOCOR PO   Dose:  20 mg        Take 20 mg by mouth At Bedtime  "  Refills:  0                Prescriptions were sent or printed at these locations (1 Prescription)                   [id number] - [place], [place] - [address]    [id number] , [place] [address]    Telephone:  [phone]   Fax:  [phone]   Hours:                  E-Prescribed (1 of 1)         trimethoprim-polymyxin b (POLYTRIM) ophthalmic solution                Procedures and tests performed during your visit     CBC with platelets differential    CRP inflammation    CT Head w/o Contrast    Erythrocyte sedimentation rate auto      Orders Needing Specimen Collection     None      Pending Results     Date and Time Order Name Status Description    2017 1115 CT Head w/o Contrast In process             Pending Culture Results     No orders found from 2017 to 5/15/2017.            Thank you for choosing [facility]       Thank you for choosing [facility] for your care. Our goal is always to provide you with excellent care. Hearing back from our patients is one way we can continue to improve our services. Please take a few minutes to complete the written survey that you may receive in the mail after you visit with us. Thank you!        [employer]     [employer] lets you send messages to your doctor, view your test results, renew your prescriptions, schedule appointments and more. To sign up, go to [email or web address] . Click on \"Log in\" on the left side of the screen, which will take you to the Welcome page. Then click on \"Sign up Now\" on the right side of the page.     You will be asked to enter the access code listed below, as well as some personal information. Please follow the directions to create your username and password.     Your access code is: [email or web address]  Expires: 2017 10:28 AM     Your access code will  in 90 days. If you need help or a new code, please call your [facility] clinic or [phone].        Care EveryWhere ID     This is your Care EveryWhere ID. This could " be used by other organizations to access your [facility] medical records  [id number]        After Visit Summary       This is your record. Keep this with you and show to your community pharmacist(s) and doctor(s) at your next visit.

## 2017-05-14 NOTE — ED NOTES
All discharge instructions and avs discussed with patient and her son.  All questions answered.  Pt able to verbalize home care, follow up and medication management.  All belongings sent home

## 2017-05-14 NOTE — ED AVS SNAPSHOT
[facility]    [address] [address]    Phone:  [phone]                                       Louise Johnson   MRN: [id number]    Department:  [facility]   Date of Visit:  5/14/2017           After Visit Summary Signature Page     I have received my discharge instructions, and my questions have been answered. I have discussed any challenges I see with this plan with the nurse or doctor.    ..........................................................................................................................................  Patient/Patient Representative Signature      ..........................................................................................................................................  Patient Representative Print Name and Relationship to Patient    ..................................................               ................................................  Date                                            Time    ..........................................................................................................................................  Reviewed by Signature/Title    ...................................................              ..............................................  Date                                                            Time

## 2017-05-14 NOTE — ED PROVIDER NOTES
History     Chief Complaint   Patient presents with     Eye Problem     HPI  Louise Johnson is a 88 year old female, resident at [facility], hx of IDDM, multiple TIA's, ICB, macular degeneration, plugged tear ducts, presents to ED with son for evaluation of right eye pain. Onset of sx this morning. Pt reports to throbbing type sensation around eye and into right cheek area. Son states she had a change in vision on Thursday, was seen by [facility] then referred to [facility]. Full exam performed, dx with worsening macular degeneration and nothing more could be done. Denies worsening visual changes, no fever, no cp or sob. Denies injury. Denies dizziness, no nausea, ate normal breakfast this morning.     I have reviewed the Medications, Allergies, Past Medical and Surgical History, and Social History in the Epic system.    Review of Systems   Constitutional: Negative for fever.   HENT: Negative for congestion.    Eyes: Positive for photophobia, pain, redness and visual disturbance.   Respiratory: Positive for cough. Negative for shortness of breath.    Cardiovascular: Negative for chest pain.   Gastrointestinal: Negative for abdominal pain.   Genitourinary: Negative for difficulty urinating.   Musculoskeletal: Negative for arthralgias and neck stiffness.   Skin: Negative for color change.   Neurological: Negative for headaches.   Psychiatric/Behavioral: Negative for confusion.       Physical Exam   BP: 153/75  Pulse: 93  Temp: 97.6  F (36.4  C)  Resp: 20  SpO2: 94 %  Physical Exam   Constitutional: She is oriented to person, place, and time. No distress.   HENT:   Head: Normocephalic.   Eyes: EOM are normal. Pupils are equal, round, and reactive to light. Right conjunctiva is injected. Right conjunctiva has no hemorrhage.   Slit lamp exam:       The right eye shows corneal abrasion and fluorescein uptake.       0.5 cm irregular area of fluorescein uptake as illustrated.    Neck: Normal range of motion. Neck  supple.   Cardiovascular: Normal rate and regular rhythm.    Pulmonary/Chest: Effort normal. No respiratory distress.   Abdominal: Soft.   Musculoskeletal: Normal range of motion.   Neurological: She is alert and oriented to person, place, and time. No cranial nerve deficit.   Skin: Skin is warm and dry. She is not diaphoretic.   Nursing note and vitals reviewed.      ED Course     Procedures        Labs Ordered and Resulted from Time of ED Arrival Up to the Time of Departure from the ED   CBC WITH PLATELETS DIFFERENTIAL - Abnormal; Notable for the following:        Result Value    WBC 3.3 (*)     Platelet Count 149 (*)     All other components within normal limits   CRP INFLAMMATION   ERYTHROCYTE SEDIMENTATION RATE AUTO     CT HEAD WITHOUT CONTRAST    CLINICAL HISTORY:  Right eye pain, change in vision.    COMPARISON:  Today's study is compared to a prior examination which is  dated September 20, 2016.    TECHNIQUE: Non-contrast CT images of the head were obtained.    FINDINGS:  There is moderate diffuse atrophy.  The ventricles are  appropriate in size.  Areas of low attenuation in the periventricular  and subcortical white-matter are non-specific, but most likely  represent chronic small-vessel ischemic disease. There are no findings  of acute ischemia.  No mass effect or midline shift.    The bones are intact.  There is mild partial opacification of the left  sphenoid sinus.    IMPRESSION:  NO ACUTE INTRACRANIAL FINDINGS    Assessments & Plan (with Medical Decision Making)   Pt presents with right eye pain, change in vision. Exam as above. CT and labs normal. Fluorescein exam shows increased uptake. Pain did improve with tetracaine.   Discussed case with Dr. Phan who recommended treating as abrasion and f/u with opthalmology tomorrow morning. Pt verbalizes understanding and agrees with plan.  Epic discharge instructions given. Pt discharged in stable condition.      I have reviewed the nursing notes.    I  have reviewed the findings, diagnosis, plan and need for follow up with the patient.    Discharge Medication List as of 5/14/2017 12:48 PM      START taking these medications    Details   trimethoprim-polymyxin b (POLYTRIM) ophthalmic solution Apply 1 drop to eye every 4 hours for 7 days, Disp-1 Bottle, R-0, E-Prescribe             Final diagnoses:   Corneal abrasion, right, initial encounter   See attached for home care  Apply drops as prescribed  Appointment with Dr. Phan tomorrow if not improving or back to baseline  Return to ED with worsening sx.       5/14/2017   [facility]     Malena Alicea APRN FNP  05/17/17 0735

## 2017-05-14 NOTE — DISCHARGE INSTRUCTIONS
See attached for home care  Apply drops as prescribed  Appointment with Dr. Phan tomorrow if not improving or back to baseline  Return to ED with worsening sx.     Corneal Abrasion    You have received a scratch or scrape (abrasion) to your cornea. The cornea is the clear part in the front of the eye. This sensitive area is very painful when injured. You may make tears frequently, and your vision may be blurry until the injury heals. You may be sensitive to light.  This part of the body heals quickly. You can expect the pain to go away within 24 to 48 hours. If the abrasion is large or deep, your doctor may apply an eye patch, although this is not always done. An antibiotic ointment or eye drops may also be used to prevent infection.  Numbing drops may be used to relieve the pain temporarily so that your eyes can be examined. However, these drops cannot be prescribed for home use because that would prevent healing and lead to more serious problems. Also, if you can t feel your eye, there is a chance of accidentally injuring it further without knowing it.  Home care     A cold pack (ice in a plastic bag, wrapped in a towel) may be applied over the eye (or eye patch) for 20 minutes at a time, to reduce pain.    You may use acetaminophen or ibuprofen to control pain, unless another pain medicine was prescribed. Note: If you have chronic liver or kidney disease or ever had a stomach ulcer or GI bleeding, talk with your doctor before using these medicines.    Rest your eyes and do not read until symptoms are gone.    If you use contact lenses, do not wear them until all symptoms are gone.    If your vision is affected by the corneal abrasion or if an eye patch was applied, do not drive a motor vehicle or operate machinery until all symptoms are gone. You may have trouble judging distances using only one eye.    If your eyes are sensitive to light, try wearing sunglasses, or stay indoors until symptoms go  away.  Follow-up care  Follow up with your health care provider, or as advised.    If no patch was put on your eye, and used but the pain continues for more than 48 hours, you should have another exam. Return to this facility or contact your health care provider to arrange this.    If your eye was patched and you were asked to remove the patch yourself, see your health care provider. You may also return to this facility if you still have pain after the patch is removed.    If you were given a return appointment for patch removal and re-examination, be sure to keep the appointment. Leaving the patch in place longer than advised could be harmful.  When to seek medical advice  Call your health care provider right away if any of these occur.    Increasing eye pain or pain that does not improve after 24 hours    Discharge from the eye    Increasing redness of the eye or swelling of the eyelids    Worsening vision     Symptoms that worsen after the abrasion has healed     5885-3099 [employer]. [address] [place] [address], [place] [address]. All rights reserved. This information is not intended as a substitute for professional medical care. Always follow your healthcare professional's instructions.

## 2017-05-14 NOTE — ED NOTES
Pt reports to er with son c/o right eye pain that goes into her sinus.  Pt reports history of macular degeneration and clogged tear duct in that eye.  Pt seen in [place] on Thursday for macular degeneration.  Pt denies any recent falls.  Son states that pt is acting at her baseline.  Pt slightly confused but oriented x3.  Call light within reach.  Assessment as charted.

## 2017-06-02 PROBLEM — R53.1 GENERAL WEAKNESS: Status: ACTIVE | Noted: 2017-01-01

## 2017-06-02 PROBLEM — J18.9 PNEUMONIA OF LEFT LOWER LOBE DUE TO INFECTIOUS ORGANISM: Status: ACTIVE | Noted: 2017-01-01

## 2017-06-02 PROBLEM — J18.9 PNEUMONIA: Status: ACTIVE | Noted: 2017-01-01

## 2017-06-02 NOTE — IP AVS SNAPSHOT
Louise Alcantar #[id number] (CSN: [id number])  (88 year old F)  (Adm: 17)     [id number]               [facility]: [phone]            Patient Demographics     Patient Name Sex          Age SSN Address Contact Numbers    Louise Alcantar Female 1929 (88 year old) [id number]  [place] [facility] [address] [phone]      Emergency Contact(s)     Name Relation Home Work Mobile    TADEO ALCANTAR Son [phone]    VIJI BUCKLEY Daughter [phone]    ALICIA RODRÍGUEZchild [phone]    KB WASHINGTON Daughter [phone]  none      Admission Information     Attending Provider Admitting Provider Admission Type Admission Date/Time    Boston Cagle, Boston Chaidez,  Emergency 17  0916    Discharge Date Hospital Service Auth/Cert Status Service Area     General Medicine Incomplete [facility] HE[facility] SERVICES    Unit Room/Bed Admission Status       [facility] 3216/3216-1 Admission (Confirmed)       Admission     Complaint    Pneumonia      Hospital Account     Name Acct ID Class Status Primary Coverage    Louise Alcantar [id number] Inpatient Open MEDICARE - MEDICARE FOR HB SUPPLEMENT            Guarantor Account (for Hospital Account #[id number])     Name Relation to Pt Service Area Active? Acct Type    Louise Alcantar Self RANGE Yes Personal/Family    Address Phone          [address] [place] [address] [phone](H)              Coverage Information (for Hospital Account #[id number])     1. MEDICARE/MEDICARE FOR HB SUPPLEMENT     F/O Payor/Plan Precert #    MEDICARE/MEDICARE FOR HB SUPPLEMENT     Subscriber Subscriber #    Louise Alcantar [id number]    Address Phone    [employer] CLAIM[employer]  [address]  [place] [address] [phone]          2. BCBS/BCBS PLATINUM BLUE     F/O Payor/Plan Precert #    BCBS/BCBS PLATINUM BLUE     Subscriber Subscriber #    Alex  "Louise TURPIN [id number]    Address Phone    [address] [address]  [place], [place] [id number] [phone]                                                      INTERAGENCY TRANSFER FORM - PHYSICIAN ORDERS   6/2/2017                       [facility]: [phone]            Attending Provider: Boston Cagle DO     Allergies:  Sulfasalazine, Cimetidine, Codeine Sulfate, Diflucan, Iodine I 131 Tositumomab, Morphine Sulfate, No Clinical Screening - See Comments, Tagamet, Penicillins    Infection:  None   Service:  GENERAL MEDI    Ht:  1.6 m (5' 3\")   Wt:  86.4 kg (190 lb 7.6 oz)   Admission Wt:  86.4 kg (190 lb 7.6 oz)    BMI:  33.74 kg/m 2   BSA:  1.96 m 2            ED Clinical Impression     Diagnosis Description Comment Added By Time Added    General weakness [R53.1] General weakness [R53.1]  Dawson Dennis MD 6/2/2017  9:44 AM    Pneumonia of left lower lobe due to infectious organism [J18.9] Pneumonia of left lower lobe due to infectious organism [J18.9]  Dawson Dennis MD 6/2/2017 12:04 PM    D-dimer, elevated [R79.1] D-dimer, elevated [R79.1]  Dawson Dennis MD 6/2/2017 12:04 PM      Hospital Problems as of 6/5/2017              Priority Class Noted POA    Diabetes mellitus (H) Medium  11/29/2013 Yes    Hypertension Medium  11/29/2013 Yes    * (Principal)Pneumonia of left lower lobe due to infectious organism Medium  6/2/2017 Yes    General weakness Medium  6/2/2017 Yes    Acute lower urinary tract infection Medium  6/4/2017 Yes    Renal insufficiency Medium  6/4/2017 Yes      Non-Hospital Problems as of 6/5/2017              Priority Class Noted    Small bowel obstruction (H)   9/25/2013    Anemia Medium  11/29/2013    Chronic coronary artery disease Medium  11/29/2013    Gastrointestinal hemorrhage Medium  11/29/2013    History of gastrointestinal disease Medium  11/29/2013    Pulmonary hypertension (H) Medium  12/3/2013    Congestive heart failure (H) Medium  12/4/2013    SOB (shortness of breath) " Medium  5/6/2014    Pulmonary edema   5/7/2014    ICB (intracranial bleed) (H) Medium  5/25/2016    Abdominal pain, generalized High  7/14/2016    Constipation Medium  7/14/2016    Abdominal pain Medium  7/14/2016    Right rib fracture Medium  9/12/2016    Disorientation High  9/20/2016    Falls frequently Medium  9/20/2016    Closed fracture of multiple ribs of right side with routine healing Medium  9/20/2016    Advance care planning   1/31/2017    Pneumonia Medium  6/2/2017      Code Status History     Date Active Date Inactive Code Status Order ID Comments User Context    6/5/2017  1:34 PM  DNR/DNI [id number]  Boston Cagle, DO Outpatient    6/2/2017  1:55 PM 6/5/2017  1:34 PM DNR/DNI [id number]  Boston Cagle, DO Inpatient    9/22/2016  1:29 PM 6/2/2017  1:55 PM DNR/DNI [id number]  Boston Cagle, DO Outpatient    9/20/2016  2:37 PM 9/22/2016  1:29 PM DNR/DNI [id number]  Boston Cagle, DO Inpatient    9/13/2016  7:51 AM 9/20/2016  2:37 PM Full Code [id number]  Chandana Riojas MD Outpatient    9/12/2016  7:19 PM 9/13/2016  7:51 AM Full Code [id number]  Chandana Riojas MD Inpatient    7/15/2016  8:34 AM 9/12/2016  7:19 PM DNR/DNI [id number]  Boston Cagle, DO Outpatient    7/14/2016  1:45 PM 7/15/2016  8:34 AM DNR/DNI [id number]  Boston Cagle, DO Inpatient    5/26/2016  4:46 PM 7/14/2016  1:45 PM DNR/DNI [id number]  Boston Cagle, DO Outpatient    5/25/2016  6:12 PM 5/26/2016  4:46 PM Full Code [id number]  Boston Cagle, DO Inpatient    5/9/2014 11:26 AM 5/25/2016  6:12 PM Full Code [id number]  Boston Cagle, DO Outpatient    5/6/2014  9:15 PM 5/9/2014 11:26 AM Full Code [id number]  Chandana Riojas MD Inpatient    10/4/2013 12:34 PM 5/6/2014  9:15 PM Full Code [id number]  Boston Cagle,  Outpatient    9/26/2013  7:24 PM 10/4/2013 12:34 PM Full Code [id number]  CarolynSara Knapp, SHERYL Inpatient    9/25/2013  8:51 PM 9/26/2013  7:24 PM Full Code [id number]   Bob Muller RN Inpatient      Current Code Status     Date Active Code Status Order ID Comments User Context       Prior         Medication Review      START taking        Dose / Directions Comments    levofloxacin 250 MG tablet   Commonly known as:  LEVAQUIN        Dose:  250 mg   Take 1 tablet (250 mg) by mouth daily   Quantity:  3 tablet   Refills:  0          CONTINUE these medications which may have CHANGED, or have new prescriptions. If we are uncertain of the size of tablets/capsules you have at home, strength may be listed as something that might have changed.        Dose / Directions Comments    amLODIPine 5 MG tablet   Commonly known as:  NORVASC   This may have changed:  how much to take   Used for:  Essential hypertension with goal blood pressure less than 140/90        Dose:  5 mg   Take 1 tablet (5 mg) by mouth daily   Quantity:  30 tablet   Refills:  1          CONTINUE these medications which have NOT CHANGED        Dose / Directions Comments    ASPIRIN ADULT LOW STRENGTH PO   Indication:  ecotrim        Dose:  81 mg   Take 81 mg by mouth daily   Refills:  0        carboxymethylcellulose 0.5 % Soln ophthalmic solution   Commonly known as:  REFRESH PLUS        Dose:  1 drop   Place 1 drop into both eyes daily as needed for dry eyes   Refills:  0        CARVEDILOL PO        Dose:  6.25 mg   Take 6.25 mg by mouth 2 times daily (with meals)   Refills:  0        fluticasone-salmeterol 250-50 MCG/DOSE diskus inhaler   Commonly known as:  ADVAIR        Dose:  1 puff   Inhale 1 puff into the lungs 2 times daily   Refills:  0        GABAPENTIN PO        Dose:  400 mg   Take 400 mg by mouth 2 times daily   Refills:  0        LASIX PO        Dose:  40 mg   Take 40 mg by mouth daily   Refills:  0        LEVEMIR SC        Dose:  7 Units   Inject 7 Units Subcutaneous At Bedtime   Refills:  0        LISINOPRIL PO        Dose:  40 mg   Take 40 mg by mouth daily   Refills:  0        MIRTAZAPINE PO        Dose:   30 mg   Take 30 mg by mouth At Bedtime   Refills:  0        multivitamin Tabs tablet        Dose:  1 tablet   Take 1 tablet by mouth 2 times daily   Refills:  0        NITROGLYCERIN SL        Dose:  0.4 mg   Place 0.4 mg under the tongue every 5 minutes as needed for chest pain x3 prn   Refills:  0        NONFORMULARY   Indication:  neopoly eye drops        Dose:  1 drop   Place 1 drop into the right eye 4 times daily   Refills:  0        OXYBUTYNIN CHLORIDE PO        Dose:  10 mg   Take 10 mg by mouth At Bedtime   Refills:  0        polyethylene glycol powder   Commonly known as:  MIRALAX/GLYCOLAX        Dose:  1 capful   Take 1 capful by mouth every other day   Refills:  0        terbinafine 250 MG tablet   Commonly known as:  lamISIL        Dose:  250 mg   Take 250 mg by mouth daily   Refills:  0        * TRAMADOL HCL PO        Dose:  50 mg   Take 50 mg by mouth daily   Refills:  0        * TRAMADOL HCL PO        Dose:  50 mg   Take 50 mg by mouth every 6 hours as needed for moderate to severe pain   Refills:  0        TYLENOL PO        Dose:  500 mg   Take 500 mg by mouth every 6 hours as needed for mild pain or fever   Refills:  0        VITAMIN D (CHOLECALCIFEROL) PO        Dose:  1000 Units   Take 1,000 Units by mouth daily   Refills:  0        ZOCOR PO        Dose:  20 mg   Take 20 mg by mouth At Bedtime   Refills:  0        * Notice:  This list has 2 medication(s) that are the same as other medications prescribed for you. Read the directions carefully, and ask your doctor or other care provider to review them with you.      STOP taking     METFORMIN HCL PO                   After Care     Activity - Up ad jian           Advance Diet as Tolerated       Follow this diet upon discharge: Regular       General info for SNF       Length of Stay Estimate: Short Term Care: Estimated # of Days <30  Condition at Discharge: Improving  Level of care:skilled   Rehabilitation Potential: Good  Admission H&P remains valid  "and up-to-date: Yes  Recent Chemotherapy: N/A  Use Nursing Home Standing Orders: Yes       Glucose monitor nursing POCT       Before meals and at bedtime       Mantoux instructions       Give two-step Mantoux (PPD) Per Facility Policy Yes             Referrals     Physical Therapy Adult Consult       Evaluate and treat as clinically indicated.    Reason:  Weakness from recent illness and deconditioning             Statement of Approval     Ordered          06/05/17 6094  I have reviewed and agree with all the recommendations and orders detailed in this document.  EFFECTIVE NOW     Approved and electronically signed by:  Boston Cagle DO                                                 INTERAGENCY TRANSFER FORM - NURSING   6/2/2017                       [facility]: [phone]            Attending Provider: Boston Cagle DO     Allergies:  Sulfasalazine, Cimetidine, Codeine Sulfate, Diflucan, Iodine I 131 Tositumomab, Morphine Sulfate, No Clinical Screening - See Comments, Tagamet, Penicillins    Infection:  None   Service:  GENERAL MEDI    Ht:  1.6 m (5' 3\")   Wt:  86.4 kg (190 lb 7.6 oz)   Admission Wt:  86.4 kg (190 lb 7.6 oz)    BMI:  33.74 kg/m 2   BSA:  1.96 m 2            Advance Directives        Does patient have a scanned Advance Directive/ACP document in EPIC?           Yes        Immunizations     Name Date      TDAP Vaccine (Adacel) 08/23/16       ASSESSMENT     Discharge Profile Flowsheet     EXPECTED DISCHARGE     All Quadrants Bowel Sounds  audible and normoactive 06/05/17 0950    Expected Discharge Date  06/05/17 06/03/17 1443   Last Bowel Movement  06/03/17 06/03/17 1348    DISCHARGE NEEDS ASSESSMENT     Passing flatus  yes 06/03/17 1013    Concerns To Be Addressed  -- (back home to assisted living Vs SNF) 06/03/17 1443   COMMUNICATION ASSESSMENT      Patient/family verbalizes understanding of discharge plan recommendations?  Yes 06/03/17 1443   Patient's communication style "  spoken language (English or Bilingual) 06/02/17 1401    Medical Team notified of plan?  yes 06/03/17 1443   FINAL RESOURCES      Readmission Within The Last 30 Days  no previous admission in last 30 days 06/03/17 1443   Resources List  Skilled Nursing Facility 06/03/17 1443    Anticipated Changes Related to Illness  none 06/03/17 1443   Skilled Nursing Facility  -- ([facility]) 06/03/17 1443    Equipment Currently Used at Home  cane, straight;walker, rolling;commode (sliding shower chair) 06/03/17 1443   PAS Number  [id number] 09/21/16 1451    Transportation Available  family or friend will provide 06/03/17 1443   SKIN      Key Recommendations  F/U with PCP 06/03/17 1443   Inspection  Full 06/05/17 0950    Does Patient Need a Referral for Clinic CC  No 06/03/17 1443   Skin areas NOT inspected  Hip, left;Hip, right;Buttock, left;Buttock, right;Sacrum;Coccyx 06/02/17 1432    Equipment Used at Home  bath bench 09/27/13 1342   Skin WDL  ex;characteristics 06/05/17 0950    ASSESSMENT OF FUNCTIONAL STATUS     Skin Color/Characteristics  bruised (ecchymotic) 06/05/17 0950    Prior to admission patient needed assistance with:  Medications;Meal preparation;Laundry/Housekeeping;Shopping;Transportation;Handling finances;Home maintenance/Yard work 06/03/17 1443   Skin Temperature  warm 06/05/17 0950    FUNCTIONAL LEVEL CURRENT     Skin Moisture  dry 06/05/17 0950    Change in Functional Status Since Onset of Current Illness/Injury  yes 07/14/16 1257   Skin Elasticity  quick return to original state 06/05/17 0950    GASTROINTESTINAL (ADULT,PEDIATRIC,OB)     Skin Integrity  bruise(s) 06/05/17 0950    GI WDL  ex;appearance/characteristics 06/05/17 0950   SAFETY      Abdominal Appearance  rounded 06/05/17 0950   Safety WDL  WDL 06/05/17 0950                 Assessment WDL (Within Defined Limits) Definitions           Safety WDL     Effective: 09/28/15    Row Information: <b>WDL  "Definition:</b> Bed in low position, wheels locked; call light in reach; upper side rails up x 2; ID band on<br> <font color=\"gray\"><i>Item=AS safety wdl>>List=AS safety wdl>>Version=F14</i></font>      Skin WDL     Effective: 09/28/15    Row Information: <b>WDL Definition:</b> Warm; dry; intact; elastic; without discoloration; pressure points without redness<br> <font color=\"gray\"><i>Item=AS skin wdl>>List=AS skin wdl>>Version=F14</i></font>      Vitals     Vital Signs Flowsheet     QUICK ADDS     Response to Interventions  Relief 06/03/17 1351    Quick Adds  Fingerstick Glucose 06/03/17 1217   POINT OF CARE TESTING      VITAL SIGNS     Puncture Site  fingertip 06/03/17 2212    Temp  98.2  F (36.8  C) 06/05/17 1347   Bedside Glucose (mg/dl )   236 mg/dl 06/03/17 2212    Temp src  Tympanic 06/05/17 1347   HEIGHT AND WEIGHT      Resp  16 06/05/17 1347   Height  1.6 m (5' 3\") 06/02/17 1326    Pulse  82 06/05/17 0805   Weight  86.4 kg (190 lb 7.6 oz) 06/02/17 1326    Heart Rate  77 06/05/17 1347   BSA (Calculated - sq m)  1.96 06/02/17 1326    Pulse/Heart Rate Source  Monitor 06/04/17 1636   BMI (Calculated)  33.81 06/02/17 1326    BP  148/65 06/05/17 1347   DAILY CARE      BP Location  Left arm 06/04/17 0826   Activity Type  activity adjusted per tolerance;up in chair 06/05/17 1221    OXYGEN THERAPY     Activity Level of Assistance  assistance, 1 person 06/05/17 1221    SpO2  94 % 06/05/17 1347   Activity Assistive Device  gait belt;walker 06/05/17 1221    O2 Device  None (Room air) 06/05/17 1347   ROLLY COMA SCALE      Oxygen Delivery  2 LPM 06/02/17 1249   Best Eye Response  4-->(E4) spontaneous 06/05/17 0955    PAIN/COMFORT     Best Motor Response  6-->(M6) obeys commands 06/05/17 0955    Patient Currently in Pain  denies 06/05/17 1347   Best Verbal Response  4-->(V4) confused 06/05/17 0955    Preferred Pain Scale  number (Numeric Rating Pain Scale) 06/03/17 1351   Rolly Coma Scale Score  14 06/05/17 0955 "    Patient's Stated Pain Goal  No pain 06/03/17 1351   POSITIONING      0-10 Pain Scale  8 06/03/17 1240   Body Position  independently positioning 06/05/17 1221    Pain Location  Head 06/03/17 1351   Head of Bed (HOB)  HOB at 20-30 degrees 06/04/17 1328    Pain Descriptors  Headache 06/03/17 1351   Chair  Upright in chair 06/05/17 1221    Pain Intervention(s)  Medication (See eMAR) 06/03/17 1351                 Patient Lines/Drains/Airways Status    Active LINES/DRAINS/AIRWAYS     Name: Placement date: Placement time: Site: Days: Last dressing change:    Peripheral IV 06/02/17 Right Lower forearm 06/02/17   1000   Lower forearm   3     Rash 09/28/13 0211 Bilateral midline groin 09/28/13   0211    1346     Incision/Surgical Site 09/26/13 Midline Abdomen 09/26/13       1348             Patient Lines/Drains/Airways Status    Active PICC/CVC     None            Intake/Output Detail Report     Date Intake     Output    Shift P.O. I.V. IV Piggyback Total Total       Noc 06/03/17 2300 - 06/04/17 0659 240 -- -- 240 -- 240    Day 06/04/17 0700 - 06/04/17 1459 -- -- -- -- -- 0    Suzanne 06/04/17 1500 - 06/04/17 2259 480 -- -- 480 -- 480    Noc 06/04/17 2300 - 06/05/17 0659 240 -- -- 240 -- 240    Day 06/05/17 0700 - 06/05/17 1459 -- -- -- -- -- 0      Last Void/BM       Most Recent Value    Urine Occurrence 1 at 06/05/2017 1204    Stool Occurrence 1 at 06/03/2017 1016      Case Management/Discharge Planning     Case Management/Discharge Planning Flowsheet     REFERRAL INFORMATION     EXPECTED DISCHARGE      Did the Initial Social Work Assessment result in a Social Work Case?  No 06/03/17 1443   Expected Discharge Date  06/05/17 06/03/17 1443    Admission Type  inpatient 06/03/17 1443   ASSESSMENT/CONCERNS TO BE ADDRESSED      Arrived From  -- (Assisted living [facility]) 06/03/17 1443   Concerns To Be Addressed  -- (back home to assisted living Vs SNF) 06/03/17 1443    Referral Source  admission list 06/03/17 1444    DISCHARGE PLANNING      Reason For Consult  discharge planning 06/03/17 1443   Patient/family verbalizes understanding of discharge plan recommendations?  Yes 06/03/17 1443    Record Reviewed  history and physical;plan of care 06/03/17 1443   Medical Team notified of plan?  yes 06/03/17 1443     Assigned to Dany Kinsey RN 06/03/17 1443   Readmission Within The Last 30 Days  no previous admission in last 30 days 06/03/17 1443    Primary Care Clinic Name  [facility] 06/03/17 1443   Anticipated Changes Related to Illness  none 06/03/17 1443    Primary Care MD Name  Dr Cagle 06/03/17 1443   Transportation Available  family or friend will provide 06/03/17 1443    LIVING ENVIRONMENT     Key Recommendations  F/U with PCP 06/03/17 1443    Lives With  alone 06/03/17 1443   Does Patient Need a Referral for Clinic CC  No 06/03/17 1443    Living Arrangements  assisted living 06/03/17 1443   Equipment Used at Home  bath bench 09/27/13 1342    Provides Primary Care For  no one 06/03/17 1443   FINAL NOTE      Quality Of Family Relationships  supportive;helpful;involved 06/03/17 1443   Final Note  see care plan 06/03/17 1443    Able to Return to Prior Living Arrangements  -- (unknown at this time) 06/03/17 1443   FINAL RESOURCES      HOME SAFETY     Equipment Currently Used at Home  cane, straight;walker, rolling;commode (sliding shower chair) 06/03/17 1443    Patient Feels Safe Living in Home?  yes 06/03/17 1443   Resources List  Skilled Nursing Facility 06/03/17 1443    ASSESSMENT OF FAMILY/SOCIAL SUPPORT     Sk[facility] Facility  -- ([facility]) 06/03/17 1443    Marital Status   06/03/17 1443   PAS Number  [id number] 09/21/16 1451    Who is your support system?  Children 06/03/17 1443   ABUSE RISK SCREEN      Description of Support System  Supportive;Involved 06/03/17 1443   QUESTION TO PATIENT:  Has a member of your family or a partner(now or in the past)  intimidated, hurt, manipulated, or controlled you in any way?  no 06/02/17 0922    Quality of Family Relationships  supportive;involved;evident 06/03/17 1443   QUESTION TO PATIENT: Do you feel safe going back to the place where you are living?  yes 06/02/17 0922    ASSESSMENT OF FUNCTIONAL STATUS     OBSERVATION: Is there reason to believe there has been maltreatment of a vulnerable adult (ie. Physical/Sexual/Emotional abuse, self neglect, lack of adequate food, shelter, medical care, or financial exploitation)?  no 06/02/17 0922    Prior to admission patient needed assistance with:  Medications;Meal preparation;Laundry/Housekeeping;Shopping;Transportation;Handling finances;Home maintenance/Yard work 06/03/17 1443   (R) MENTAL HEALTH SUICIDE RISK      EMPLOYMENT     Are you depressed or being treated for depression?  No 06/02/17 1407    Do you work full or part-time?  no 06/03/17 1443   HOMICIDE RISK      COPING/STRESS     Homicidal Ideation  no 06/02/17 0922    Major Change/Loss/Stressor  denies 06/03/17 1443                       [facility]: [phone]            Medication Administration Report for Louise Johnson as of 06/05/17 1348   Legend:    Given Hold Not Given Due Canceled Entry Other Actions    Time Time (Time) Time  Time-Action       Inactive    Active    Linked        Medications 05/30/17 05/31/17 06/01/17 06/02/17 06/03/17 06/04/17 06/05/17    acetaminophen (TYLENOL) tablet 325 mg  Dose: 325 mg Freq: EVERY 6 HOURS PRN Route: PO  PRN Reasons: mild pain,fever  Start: 06/02/17 1905   Admin Instructions: Formulary alternate for 500mg dose<br>  Maximum acetaminophen dose from all sources = 75 mg/kg/day not to exceed 4 grams/day.         1240 (325 mg)-Given             amLODIPine (NORVASC) tablet 7.5 mg  Dose: 7.5 mg Freq: DAILY Route: PO  Start: 06/03/17 0900        0926 (7.5 mg)-Given        0835 (7.5 mg)-Given        0811 (7.5 mg)-Given           aspirin EC EC tablet 81 mg  Dose: 81 mg Freq:  DAILY Route: PO  Indications Comment: ecotrim  Start: 06/03/17 0900        0922 (81 mg)-Given        0832 (81 mg)-Given        0809 (81 mg)-Given           benzocaine (ORAJEL/ANBESOL) 10 % gel  Freq: EVERY 3 HOURS PRN Route: MT  PRN Reason: moderate pain  Start: 06/03/17 0804              carvedilol (COREG) tablet 6.25 mg  Dose: 6.25 mg Freq: 2 TIMES DAILY WITH MEALS Route: PO  Start: 06/02/17 1915       1957 (6.25 mg)-Given        0923 (6.25 mg)-Given       1637 (6.25 mg)-Given        0832 (6.25 mg)-Given       1637 (6.25 mg)-Given        0809 (6.25 mg)-Given       [ ] 1700           cholecalciferol (vitamin D) tablet 1,000 Units  Dose: 1,000 Units Freq: DAILY Route: PO  Start: 06/03/17 0900        0924 (1,000 Units)-Given        0831 (1,000 Units)-Given        0810 (1,000 Units)-Given           enoxaparin (LOVENOX) injection 30 mg  Dose: 30 mg Freq: EVERY 24 HOURS Route: SC  Start: 06/02/17 1600   Admin Instructions: HOLD if platelet count falls below 50% of baseline or less than 100,000/ L and notify provider.        1635 (30 mg)-Given        1627 (30 mg)-Given        1637 (30 mg)-Given        [ ] 1600           fluticasone-vilanterol (BREO ELLIPTA) 100-25 MCG/INH oral inhaler 1 puff  Dose: 1 puff Freq: DAILY Route: IN  Start: 06/03/17 0900   Admin Instructions: *Do not use more frequently than once daily.*  Rinse mouth after use.<br><br>Formulary alternate for Advair <br>         1017 (1 puff)-Given        0834 (1 puff)-Given        1137 (1 puff)-Given           furosemide (LASIX) tablet 40 mg  Dose: 40 mg Freq: DAILY Route: PO  Start: 06/03/17 0900        0925 (40 mg)-Given        0832 (40 mg)-Given        0809 (40 mg)-Given           gabapentin (NEURONTIN) capsule 400 mg  Dose: 400 mg Freq: 2 TIMES DAILY Route: PO  Start: 06/02/17 2100 2142 (400 mg)-Given        0925 (400 mg)-Given       2204 (400 mg)-Given        0832 (400 mg)-Given       2126 (400 mg)-Given        0810 (400 mg)-Given       [ ] 2100            glucose 40 % gel 15-30 g  Dose: 15-30 g Freq: EVERY 15 MIN PRN Route: PO  PRN Reason: low blood sugar  Start: 06/03/17 0825   Admin Instructions: Give 15 g for BG 51 to 69 mg/dL IF patient is conscious and able to swallow. Give 30 g for BG less than or equal to 50 mg/dL IF patient is conscious and able to swallow. Do NOT give glucose gel via enteral tube.  IF patient has enteral tube: give apple juice 120 mL (4 oz or 15 g of CHO) via enteral tube for BG 51 to 69 mg/dL.  Give apple juice 240 mL (8 oz or 30 g of CHO) via enteral tube for BG less than or equal to 50 mg/dL.              Or  dextrose 50 % injection 25-50 mL  Dose: 25-50 mL Freq: EVERY 15 MIN PRN Route: IV  PRN Reason: low blood sugar  Start: 06/03/17 0825   Admin Instructions: Use if have IV access, BG less than 70 mg/dL and meet dose criteria below:  Dose if conscious and alert (or disorientated) and NPO = 25 mL  Dose if unconscious / not alert = 50 mL  Vesicant.              Or  glucagon injection 1 mg  Dose: 1 mg Freq: EVERY 15 MIN PRN Route: SC  PRN Reason: low blood sugar  PRN Comment: May repeat x 1 only  Start: 06/03/17 0825   Admin Instructions: May give SQ or IM. IF BG less than or equal to 50 mg/dL and no IV access.  ONLY use glucagon IF patient has NO IV access AND is UNABLE to swallow.               glucose 40 % gel 15-30 g  Dose: 15-30 g Freq: EVERY 15 MIN PRN Route: PO  PRN Reason: low blood sugar  Start: 06/02/17 2037   Admin Instructions: Give 15 g for BG 51 to 69 mg/dL IF patient is conscious and able to swallow. Give 30 g for BG less than or equal to 50 mg/dL IF patient is conscious and able to swallow. Do NOT give glucose gel via enteral tube.  IF patient has enteral tube: give apple juice 120 mL (4 oz or 15 g of CHO) via enteral tube for BG 51 to 69 mg/dL.  Give apple juice 240 mL (8 oz or 30 g of CHO) via enteral tube for BG less than or equal to 50 mg/dL.              Or  dextrose 50 % injection 25-50 mL  Dose: 25-50 mL Freq:  EVERY 15 MIN PRN Route: IV  PRN Reason: low blood sugar  Start: 06/02/17 2037   Admin Instructions: Use if have IV access, BG less than 70 mg/dL and meet dose criteria below:  Dose if conscious and alert (or disorientated) and NPO = 25 mL  Dose if unconscious / not alert = 50 mL  Vesicant.              Or  glucagon injection 1 mg  Dose: 1 mg Freq: EVERY 15 MIN PRN Route: SC  PRN Reason: low blood sugar  PRN Comment: May repeat x 1 only  Start: 06/02/17 2037   Admin Instructions: May give SQ or IM. IF BG less than or equal to 50 mg/dL and no IV access.  ONLY use glucagon IF patient has NO IV access AND is UNABLE to swallow.               hypromellose-dextran (ARTIFICAL TEARS) ophthalmic solution 1 drop  Dose: 1 drop Freq: EVERY 2 HOURS PRN Route: Both Eyes  PRN Reason: dry eyes  Start: 06/03/17 1620   Admin Instructions: Attn Nursing: Request from pharmacy if needed               insulin aspart (NovoLOG) inj (RAPID ACTING)  Dose: 1-5 Units Freq: AT BEDTIME Route: SC  Start: 06/03/17 2200   Admin Instructions: MEDIUM INSULIN RESISTANCE DOSING    Do Not give Bedtime Correction Insulin if BG less than  200.   For  - 249 give 1 units.   For  - 299 give 2 units.   For  - 349 give 3 units.   For  -399 give 4 units.   For BG greater than or equal to 400 give 5 units.  Notify provider if glucose greater than or equal to 350 mg/dL after administration of correction dose.  If given at mealtime, must be administered 5 min before meal or immediately after.         2210 (1 Units)-Given        2130 (1 Units)-Given        [ ] 2200           insulin aspart (NovoLOG) inj (RAPID ACTING)  Dose: 1-7 Units Freq: 3 TIMES DAILY BEFORE MEALS Route: SC  Start: 06/03/17 0830   Admin Instructions: Correction Scale - MEDIUM INSULIN RESISTANCE DOSING     Do Not give Correction Insulin if Pre-Meal BG less than 140.   For Pre-Meal  - 189 give 1 unit.   For Pre-Meal  - 239 give 2 units.   For Pre-Meal  -  289 give 3 units.   For Pre-Meal  - 339 give 4 units.   For Pre-Meal - 399 give 5 units.   For Pre-Meal -449 give 6 units  For Pre-Meal BG greater than or equal to 450 give 7 units.   To be given with prandial insulin, and based on pre-meal blood glucose.    Notify provider if glucose greater than or equal to 350 mg/dL after administration of correction dose.  If given at mealtime, must be administered 5 min before meal or immediately after.         (0908)-Not Given       1220 (2 Units)-Given [C]       1632 (1 Units)-Given [C]        0830 (1 Units)-Given [C]       1239-Hold [C]       1652 (1 Units)-Given [C]        0817 (1 Units)-Given       1228 (3 Units)-Given       [ ] 1630           insulin detemir (LEVEMIR) injection 10 Units  Dose: 10 Units Freq: AT BEDTIME Route: SC  Start: 06/04/17 2200         2130 (10 Units)-Given        [ ] 2200           levofloxacin (LEVAQUIN) infusion 250 mg  Dose: 250 mg Freq: EVERY 24 HOURS Route: IV  Indications of Use: COMMUNITY ACQUIRED PNEUMONIA  Last Dose: 250 mg (06/05/17 1339)  Start: 06/05/17 1300   Admin Instructions: Irritant. Administer at a rate of no greater than 100 mL/hr           1339 (250 mg)-New Bag           mirtazapine (REMERON) tablet 30 mg  Dose: 30 mg Freq: AT BEDTIME Route: PO  Start: 06/02/17 2200       2142 (30 mg)-Given        2204 (30 mg)-Given        2126 (30 mg)-Given        [ ] 2200           multivitamin (OCUVITE) tablet 1 tablet  Dose: 1 tablet Freq: 2 TIMES DAILY. Route: PO  Start: 06/03/17 0945        0937 (1 tablet)-Given       1627 (1 tablet)-Given        0832 (1 tablet)-Given       1637 (1 tablet)-Given        0810 (1 tablet)-Given       [ ] 1600           naloxone (NARCAN) injection 0.1-0.4 mg  Dose: 0.1-0.4 mg Freq: EVERY 2 MIN PRN Route: IV  PRN Reason: opioid reversal  Start: 06/02/17 1355   Admin Instructions: For respiratory rate LESS than or EQUAL to 8.  Partial reversal dose:  0.1 mg titrated q 2 minutes for Analgesia  Side Effects Monitoring Sedation Level of 3 (frequently drowsy, arousable, drifts to sleep during conversation).Full reversal dose:  0.4 mg bolus for Analgesia Side Effects Monitoring Sedation Level of 4 (somnolent, minimal or no response to stimulation).               nitroglycerin (NITROSTAT) sublingual tablet 0.4 mg  Dose: 0.4 mg Freq: EVERY 5 MIN PRN Route: SL  PRN Reason: chest pain  Start: 06/02/17 1905              oxybutynin (DITROPAN) tablet 10 mg  Dose: 10 mg Freq: 2 TIMES DAILY Route: PO  Start: 06/04/17 1345         1416 (10 mg)-Given       2126 (10 mg)-Given        0810 (10 mg)-Given       [ ] 2100           polyethylene glycol (MIRALAX/GLYCOLAX) Packet 17 g  Dose: 17 g Freq: EVERY OTHER DAY Route: PO  Start: 06/03/17 0900   Admin Instructions: 1 Packet = 17 grams. Mixed prescribed dose in 8 ounces of water.  1 Packet = 17 grams. Mixed prescribed dose in 8 ounces of water. Follow with 8 oz. of water.         0915 (17 g)-Given         0812 (17 g)-Given           simvastatin (ZOCOR) tablet 20 mg  Dose: 20 mg Freq: AT BEDTIME Route: PO  Start: 06/02/17 2200       2142 (20 mg)-Given        2205 (20 mg)-Given        2126 (20 mg)-Given        [ ] 2200           traMADol (ULTRAM) tablet 50 mg  Dose: 50 mg Freq: EVERY 6 HOURS PRN Route: PO  PRN Reason: moderate to severe pain  Start: 06/02/17 1905             Completed Medications  Medications 05/30/17 05/31/17 06/01/17 06/02/17 06/03/17 06/04/17 06/05/17         Dose: 100 mg Freq: ONCE Route: PO  Start: 06/04/17 1400   End: 06/04/17 1416         1416 (100 mg)-Given           Discontinued Medications  Medications 05/30/17 05/31/17 06/01/17 06/02/17 06/03/17 06/04/17 06/05/17         Rate: 50 mL/hr Freq: CONTINUOUS Route: IV  Last Dose: Stopped (06/04/17 1404)  Start: 06/02/17 1400   End: 06/04/17 1341       1412 ( )-Rate/Dose Change       2101 ( )-New Bag        0645 ( )-New Bag       0928 ( )-Rate/Dose Change        1341-Med Discontinued  1404-Stopped               Rate: 125 mL/hr Freq: CONTINUOUS Route: IV  Last Dose: Stopped (06/02/17 1250)  Start: 06/02/17 0944   End: 06/02/17 1355       1003 ( )-New Bag       1225 ( )-Rate/Dose Change       1250-ED Infusing on Admission/transfer       1355-Med Discontinued            Freq: EVERY 3 HOURS PRN Route: MT  PRN Reason: moderate pain  Start: 06/03/17 0704   End: 06/03/17 0805        0805-Med Discontinued           Dose: 1 drop Freq: EVERY 2 HOURS PRN Route: Both Eyes  PRN Reason: dry eyes  Start: 06/03/17 1616   End: 06/03/17 1620        1620-Med Discontinued           Dose: 40 mg Freq: EVERY 24 HOURS Route: SC  Start: 06/02/17 1400   End: 06/02/17 1403   Admin Instructions: HOLD if platelet count falls below 50% of baseline or less than 100,000/ L and notify provider.               1403-Med Discontinued            Dose: 1 drop Freq: DAILY PRN Route: Both Eyes  PRN Reason: dry eyes  Start: 06/02/17 1905   End: 06/03/17 1620   Admin Instructions: Attn Nursing: Request from pharmacy if needed         1620-Med Discontinued           Dose: 7 Units Freq: AT BEDTIME Route: SC  Start: 06/02/17 2200   End: 06/04/17 0830       2147 (7 Units)-Given [C]        2208 (7 Units)-Given        0830-Med Discontinued          Dose: 250 mg Freq: EVERY 48 HOURS Route: IV  Indications of Use: COMMUNITY ACQUIRED PNEUMONIA  Last Dose: 250 mg (06/04/17 1242)  Start: 06/04/17 1230   End: 06/05/17 1049   Admin Instructions: Irritant. Administer at a rate of no greater than 100 mL/hr          1242 (250 mg)-New Bag        1049-Med Discontinued         Dose: 500 mg Freq: EVERY 24 HOURS Route: IV  Indications of Use: COMMUNITY ACQUIRED PNEUMONIA  Start: 06/03/17 1230   End: 06/03/17 0935   Admin Instructions: Irritant. Administer at a rate of no greater than 100 mL/hr         0935-Med Discontinued           Dose: 40 mg Freq: DAILY Route: PO  Start: 06/02/17 1915   End: 06/02/17 1907       1907-Med Discontinued            Dose: 1,000 mg Freq: DAILY Route:  PO  Start: 06/02/17 1915   End: 06/02/17 1908       1908-Med Discontinued            Dose: 1 tablet Freq: 2 TIMES DAILY. Route: PO  Start: 06/03/17 0930   End: 06/03/17 0933               0933-Med Discontinued           Dose: 1 tablet Freq: 2 TIMES DAILY. Route: PO  Start: 06/02/17 1915   End: 06/03/17 0916       2022 (1 tablet)-Given               0916-Med Discontinued           Dose: 10 mg Freq: AT BEDTIME Route: PO  Start: 06/02/17 2200   End: 06/04/17 1341       2143 (10 mg)-Given        2205 (10 mg)-Given        1341-Med Discontinued     Medications 05/30/17 05/31/17 06/01/17 06/02/17 06/03/17 06/04/17 06/05/17               INTERAGENCY TRANSFER FORM - NOTES (H&P, Discharge Summary, Consults, Procedures, Therapies)   6/2/2017                       [facility]: [phone]               History & Physicals      H&P by Boston Cagle DO at 6/2/2017  1:15 PM     Author:  Boston Cagle DO Service:  Family Medicine Author Type:  Osteopath    Filed:  6/2/2017  1:22 PM Date of Service:  6/2/2017  1:15 PM Creation Time:  6/2/2017  1:15 PM    Status:  Signed :  Boston Cagle DO (Osteopcameron)         [facility]    History and Physical  Hospitalist       Date of Admission:  6/2/2017    Assessment & Plan   Louise Johnson is a 88 year old female who presents with     Active Problems:    Pneumonia of left lower lobe due to infectious organism    Assessment: stable but will need antibiotics and hospitalization    Plan: will admit and treat empirically and provide pulmonary toilet    General weakness    Assessment: multifactoral recent illness and arthritis and deconditioning play a large role    Plan: get PT and OT and follow     DVT Prophylaxis: Enoxaparin (Lovenox) SQ  Code Status: DNR / DNI    Disposition: Expected discharge in 3 days once improved and at baseline.    Boston Cagle    Primary Care Physician   Boston Cagle    Chief Complaint   Weakness last 2 days  with fall today    History is obtained from the patient    History of Present Illness   Louise Johnson is a 88 year old female who presents with 2 day history of progressive weakness and now falls today.  Seen in ER and found to be more confused than normal.  cxr show left lower lobe infiltrate.    Past Medical History    I have reviewed this patient's medical history and updated it with pertinent information if needed.   Past Medical History:   Diagnosis Date     A-fib (H)      A-fib (H)      ASCVD (arteriosclerotic cardiovascular disease)     stent early 2000     Atrial fibrillation (H)      Atrial fibrillation (H)      Cerebral infarction (H)     multiple TIA     Depression, major      Diabetes mellitus (H)      Diverticulitis      HTN (hypertension)      Hyperlipidemia      ICB (intracranial bleed) (H)     had 2 areas of bleeding after fall      Osteoarthritis        Past Surgical History   I have reviewed this patient's surgical history and updated it with pertinent information if needed.  Past Surgical History:   Procedure Laterality Date     ANGIOPLASTY  1993' 1995, 2003     ARTHROSCOPY KNEE Left 1994     BREAST BIOPSY, RT/LT       C TOTAL KNEE ARTHROPLASTY Left 2009     CATARACT IOL, RT/LT Bilateral 2008     CHOLECYSTECTOMY       COLONOSCOPY  2003     HERNIORRHAPHY VENTRAL  9/26/2013    Procedure: HERNIORRHAPHY VENTRAL;  VENTRAL HERNIA REPAIR, POSSIBLE SMALL BOWEL RESECTION;  Surgeon: Zhang Johnston MD;  Location: [facility]     LAPAROSCOPIC APPENDECTOMY       LAPAROTOMY, LYSIS ADHESIONS, COMBINED  9/26/2013    Procedure: COMBINED LAPAROTOMY, LYSIS ADHESIONS;  Extensive Lysis of Adhesions;  Surgeon: Zhang Johnston MD;  Location: [facility]     SIGMOIDECTOMY      For diverticulitis       Prior to Admission Medications   Prior to Admission Medications   Prescriptions Last Dose Informant Patient Reported? Taking?   ASPIRIN ADULT LOW STRENGTH PO   Yes Yes   Sig: Take 81 mg by mouth daily   Acetaminophen (TYLENOL  PO)   Yes Yes   Sig: Take 500 mg by mouth every 6 hours as needed for mild pain or fever   CARVEDILOL PO   Yes Yes   Sig: Take 6.25 mg by mouth 2 times daily (with meals)   GABAPENTIN PO   Yes Yes   Sig: Take 400 mg by mouth 2 times daily    Insulin Detemir (LEVEMIR SC)   Yes Yes   Sig: Inject 7 Units Subcutaneous At Bedtime    LISINOPRIL PO   Yes Yes   Sig: Take 40 mg by mouth daily   METFORMIN HCL PO   Yes Yes   Sig: Take 1,000 mg by mouth daily   MIRTAZAPINE PO   Yes Yes   Sig: Take 30 mg by mouth At Bedtime   NITROGLYCERIN SL   Yes Yes   Sig: Place 0.4 mg under the tongue every 5 minutes as needed for chest pain x3 prn   OXYBUTYNIN CHLORIDE PO   Yes Yes   Sig: Take 10 mg by mouth At Bedtime    Simvastatin (ZOCOR PO)   Yes Yes   Sig: Take 20 mg by mouth At Bedtime    TRAMADOL HCL PO Unknown at Unknown time  Yes No   Sig: Take 25 mg by mouth every 6 hours as needed for moderate to severe pain   VITAMIN D, CHOLECALCIFEROL, PO   Yes Yes   Sig: Take 1,000 Units by mouth daily   amLODIPine (NORVASC) 5 MG tablet   No Yes   Sig: Take 1 tablet (5 mg) by mouth daily   Patient taking differently: Take 7.5 mg by mouth daily    fluticasone-salmeterol (ADVAIR) 250-50 MCG/DOSE diskus inhaler   Yes Yes   Sig: Inhale 1 puff into the lungs 2 times daily   multivitamin (OCUVITE) TABS   Yes Yes   Sig: Take 1 tablet by mouth 2 times daily    oxyCODONE-acetaminophen (PERCOCET) 5-325 MG per tablet   No No   Sig: Take 1 tablet by mouth every 4 hours as needed for moderate to severe pain   polyethylene glycol (MIRALAX/GLYCOLAX) powder   Yes Yes   Sig: Take 1 capful by mouth as needed for constipation   terbinafine (LAMISIL) 250 MG tablet   Yes Yes   Sig: Take 250 mg by mouth daily      Facility-Administered Medications: None     Allergies   Allergies   Allergen Reactions     Sulfasalazine Anaphylaxis     Pt takes asa & ibuprofen at home     Cimetidine      Codeine Sulfate      Tolerated percocet, lortab, and ultram     Diflucan       Iodine I 131 Tositumomab      Morphine Sulfate      No Clinical Screening - See Comments      Flu vaccine     Tagamet      Penicillins Rash     Entire body       Social History   I have reviewed this patient's social history and updated it with pertinent information if needed. Louise Johnson  reports that she has quit smoking. She smoked 0.50 packs per day for 0.00 years. She has never used smokeless tobacco. She reports that she does not drink alcohol or use illicit drugs.    Family History   I have reviewed this patient's family history and updated it with pertinent information if needed.   Family History   Problem Relation Age of Onset     DIABETES Mother      Hypertension Mother      C.A.D. Daughter      DIABETES Brother      DIABETES Sister      DIABETES Sister      Cardiovascular Brother      AAA       Review of Systems   CONSTITUTIONAL:  negative  HEENT:  negative  RESPIRATORY:  Diminished left base  CARDIOVASCULAR:  negative  GASTROINTESTINAL:  negative  GENITOURINARY:  negative  INTEGUMENT/BREAST:  negative  MUSCULOSKELETAL:  negative    Physical Exam   Temp: 96.8  F (36  C) Temp src: Tympanic BP: (!) 142/45 Pulse: 66 Heart Rate: 67 Resp: 20 SpO2: 94 % O2 Device: Nasal cannula Oxygen Delivery: 2 LPM  Vital Signs with Ranges  Temp:  [96.8  F (36  C)] 96.8  F (36  C)  Pulse:  [66-67] 66  Heart Rate:  [61-82] 67  Resp:  [16-20] 20  BP: (107-142)/(43-65) 142/45  SpO2:  [92 %-94 %] 94 %  0 lbs 0 oz    Constitutional: awake but confused  Eyes: Lids and lashes normal, pupils equal, round and reactive to light, extra ocular muscles intact, sclera clear, conjunctiva normal  ENT: Normocephalic, without obvious abnormality, atraumatic, sinuses nontender on palpation, external ears without lesions, oral pharynx with moist mucous membranes, tonsils without erythema or exudates, gums normal and good dentition.  Hematologic / Lymphatic: no cervical lymphadenopathy  Respiratory: diminished left lower lobe  Cardiovascular:  Normal apical impulse, regular rate and rhythm, normal S1 and S2, no S3 or S4, and no murmur noted  GI: No scars, normal bowel sounds, soft, non-distended, non-tender, no masses palpated, no hepatosplenomegally  Genitounirinary: normal  Skin: no bruising or bleeding  Musculoskeletal: equal and generalized weakness  Neurologic: awake but easily confused   Neuropsychiatric: General: normal, calm and normal eye contact    Data   Data reviewed today:  I personally reviewed the cxr image(s) showing left lower infiltrate.    Recent Labs  Lab 06/02/17  0957   WBC 5.3   HGB 14.4   MCV 93      INR 1.15   *   POTASSIUM 5.3   CHLORIDE 96   CO2 22   BUN 82*   CR 2.00*   ANIONGAP 13   KAILA 9.4   *   ALBUMIN 3.3*   PROTTOTAL 8.0   BILITOTAL 0.5   ALKPHOS 96   ALT 33   AST 34       Recent Results (from the past 24 hour(s))   Head CT w/o contrast    Narrative    HEAD CT    HISTORY:  Slurred speech.    COMPARISON:  Today's study is compared to a prior examination which is  dated May 14, 2017.    FINDINGS:  The ventricles and sulci are mildly prominent.  White  matter changes are similar in appearance.  Bony structures are  unremarkable.    IMPRESSION:  1.  NO EVIDENCE OF ACUTE ABNORMALITY.    2.  MILD GENERALIZED ATROPHY AND SMALL-VESSEL DISEASE ARE SIMILAR IN  APPEARANCE COMPARED TO THE PREVIOUS STUDY.  Exam Date: Jun 02, 2017 10:12:00 AM  Author: NADINE ROCKWELL  This report is final and signed     XR Chest 2 Views    Narrative    CHEST TWO VIEWS    HISTORY:  Fever.    COMPARISON:  Today's study is compared to a prior examination which is  dated November 20, 2016.    FINDINGS:  The cardiac silhouette and pulmonary vascularity are within  normal limits.  There is slight increase in density in the left lung  that appears to be associated with the lingula, partially silhouetting  the left heart border.    IMPRESSION:  SUBTLE LINGULAR PNEUMONIA.  Exam Date: Jun 02, 2017 10:23:36 AM  Author: NADINE ROCKWELL  This  report is final and signed[MC1.1]            Revision History        User Key Date/Time User Provider Type Action    > MC1.1 6/2/2017  1:22 PM Boston Cagle DO Osteopath Sign                     Discharge Summaries      Discharge Summaries by Boston Cagle DO at 6/5/2017  1:35 PM     Author:  Boston Cagle DO Service:  Family Medicine Author Type:  Osteopath    Filed:  6/5/2017  1:38 PM Date of Service:  6/5/2017  1:35 PM Creation Time:  6/5/2017  1:35 PM    Status:  Signed :  Boston Cagle DO (Javier)         [facility]    Discharge Summary  Hospitalist    Date of Admission:  6/2/2017  Date of Discharge:  6/5/2017  Discharging Provider: Boston Cagle  Date of Service (when I saw the patient): 06/05/17      Hospital Course   Louise Johnson was admitted on 6/2/2017.  The following problems were addressed during her hospitalization:    Principal Problem:    Pneumonia of left lower lobe due to infectious organism    Assessment: rewsolving     Plan: dc to NH with oral antibiotics will follow up as outpt  Active Problems:    Diabetes mellitus (H)    Assessment: stable with changes    Plan: will dc to NH for rehab on long acting insulin and adjust as needed    Hypertension    Assessment: stable    Plan: cont meds and follow as outpt    General weakness    Assessment: stble    Plan: will need rehab before returning home    Acute lower urinary tract infection    Assessment: resolved    Plan: monitor    Renal insufficiency    Assessment: improved     Plan: will need to adjust and follow      Boston Cagle    Significant Results and Procedures       Pending Results   These results will be followed up by ismael  Unresulted Labs Ordered in the Past 30 Days of this Admission     Date and Time Order Name Status Description    6/2/2017 1103 Blood culture Preliminary     6/2/2017 1103 Blood culture Preliminary           Code Status   DNR / DNI       Primary Care  Physician   Boston Cagle          Discharge Disposition   Discharged to nursing home  Condition at discharge: Stable    Consultations This Hospital Stay   SPIRITUAL HEALTH SERVICES IP CONSULT  PHYSICAL THERAPY ADULT IP CONSULT  PHYSICAL THERAPY ADULT IP CONSULT    Time Spent on this Encounter   I, Boston Cagle, personally saw the patient today and spent greater than 30 minutes discharging this patient.    Discharge Orders     General info for SNF   Length of Stay Estimate: Short Term Care: Estimated # of Days <30  Condition at Discharge: Improving  Level of care:skilled   Rehabilitation Potential: Good  Admission H&P remains valid and up-to-date: Yes  Recent Chemotherapy: N/A  Use Nursing Home Standing Orders: Yes     Mantoux instructions   Give two-step Mantoux (PPD) Per Facility Policy Yes     Glucose monitor nursing POCT   Before meals and at bedtime     Activity - Up ad jian     DNR/DNI     Physical Therapy Adult Consult   Evaluate and treat as clinically indicated.    Reason:  Weakness from recent illness and deconditioning     Advance Diet as Tolerated   Follow this diet upon discharge: Regular       Discharge Medications   Current Discharge Medication List      START taking these medications    Details   levofloxacin (LEVAQUIN) 250 MG tablet Take 1 tablet (250 mg) by mouth daily  Qty: 3 tablet, Refills: 0    Associated Diagnoses: Pneumonia of left lower lobe due to infectious organism         CONTINUE these medications which have NOT CHANGED    Details   Furosemide (LASIX PO) Take 40 mg by mouth daily      NONFORMULARY Place 1 drop into the right eye 4 times daily      !! TRAMADOL HCL PO Take 50 mg by mouth daily       amLODIPine (NORVASC) 5 MG tablet Take 1 tablet (5 mg) by mouth daily  Qty: 30 tablet, Refills: 1    Associated Diagnoses: Essential hypertension with goal blood pressure less than 140/90      fluticasone-salmeterol (ADVAIR) 250-50 MCG/DOSE diskus inhaler Inhale 1 puff into the  lungs 2 times daily      Insulin Detemir (LEVEMIR SC) Inject 7 Units Subcutaneous At Bedtime       Acetaminophen (TYLENOL PO) Take 500 mg by mouth every 6 hours as needed for mild pain or fever      MIRTAZAPINE PO Take 30 mg by mouth At Bedtime      ASPIRIN ADULT LOW STRENGTH PO Take 81 mg by mouth daily      CARVEDILOL PO Take 6.25 mg by mouth 2 times daily (with meals)      GABAPENTIN PO Take 400 mg by mouth 2 times daily       terbinafine (LAMISIL) 250 MG tablet Take 250 mg by mouth daily      OXYBUTYNIN CHLORIDE PO Take 10 mg by mouth At Bedtime       VITAMIN D, CHOLECALCIFEROL, PO Take 1,000 Units by mouth daily      multivitamin (OCUVITE) TABS Take 1 tablet by mouth 2 times daily       LISINOPRIL PO Take 40 mg by mouth daily      Simvastatin (ZOCOR PO) Take 20 mg by mouth At Bedtime       !! TRAMADOL HCL PO Take 50 mg by mouth every 6 hours as needed for moderate to severe pain      carboxymethylcellulose (REFRESH PLUS) 0.5 % SOLN ophthalmic solution Place 1 drop into both eyes daily as needed for dry eyes      NITROGLYCERIN SL Place 0.4 mg under the tongue every 5 minutes as needed for chest pain x3 prn      polyethylene glycol (MIRALAX/GLYCOLAX) powder Take 1 capful by mouth every other day        !! - Potential duplicate medications found. Please discuss with provider.      STOP taking these medications       METFORMIN HCL PO Comments:   Reason for Stopping:         METFORMIN HCL PO Comments:   Reason for Stopping:             Allergies   Allergies   Allergen Reactions     Sulfasalazine Anaphylaxis     Pt takes asa & ibuprofen at home     Cimetidine      Codeine Sulfate      Tolerated percocet, lortab, and ultram     Diflucan      Iodine I 131 Tositumomab      Morphine Sulfate      No Clinical Screening - See Comments      Flu vaccine     Tagamet      Penicillins Rash     Entire body     Data   Most Recent 3 CBC's:[MC1.1]  Recent Labs   Lab Test  06/05/17   0520  06/03/17   0529  06/02/17   0957  05/14/17    1134   WBC   --   3.8*  5.3  3.3*   HGB   --   13.1  14.4  13.3   MCV   --   94  93  99   PLT  149*  166  202  149*[MC1.2]      Most Recent 3 BMP's:[MC1.1]  Recent Labs   Lab Test  06/05/17   0520  06/04/17   0519  06/03/17   0529   NA  136  137  136   POTASSIUM  4.2  4.6  4.6   CHLORIDE  102  104  102   CO2  25  26  25   BUN  37*  48*  69*   CR  1.17*  1.40*  1.61*   ANIONGAP  9  7  9   KAILA  8.3*  8.2*  8.3*   GLC  192*  188*  118*[MC1.2]     Most Recent 2 LFT's:[MC1.1]  Recent Labs   Lab Test  06/02/17   0957  09/20/16   1506   AST  34  66*   ALT  33  77*   ALKPHOS  96  154*   BILITOTAL  0.5  0.3[MC1.2]     Most Recent INR's and Anticoagulation Dosing History:  Anticoagulation Dose History     Recent Dosing and Labs Latest Ref Rng & Units 9/25/2013 5/25/2016 6/2/2017    INR 0.80 - 1.20 1.12 1.16 1.15        Most Recent 3 Troponin's:[MC1.1]  Recent Labs   Lab Test  05/25/16   1340   TROPI  0.059*[MC1.2]     Most Recent Cholesterol Panel:[MC1.1]No lab results found.[MC1.2]  Most Recent 6 Bacteria Isolates From Any Culture (See EPIC Reports for Culture Details):[MC1.1]  Recent Labs   Lab Test  06/02/17   1509  06/02/17   1140  06/02/17   1130  06/02/17   1035  09/20/16   1257  09/12/16   2030   CULT  No MRSA isolated  No growth after 3 days  No growth after 3 days  >100,000 colonies/mL Escherichia coli*  No MRSA isolated  No MRSA isolated[MC1.2]     Most Recent TSH, T4 and A1c Labs:[MC1.1]  Recent Labs   Lab Test  06/02/17   0957  09/28/13   0542   TSH  1.65   --    A1C   --   7.2*[MC1.2]          Revision History        User Key Date/Time User Provider Type Action    > MC1.2 6/5/2017  1:38 PM Boston Cagle,  Osteopath Sign     MC1.1 6/5/2017  1:35 PM Boston Cagle, DO Osteopath                   Consult Notes     No notes of this type exist for this encounter.         Progress Notes - Physician (Notes for yesterday and today)      Progress Notes by Boston Cagle DO at 6/5/2017  8:16 AM      Author:  Boston Cagle DO Service:  Family Medicine Author Type:  Osteopath    Filed:  6/5/2017  8:19 AM Date of Service:  6/5/2017  8:16 AM Creation Time:  6/5/2017  8:16 AM    Status:  Signed :  Boston Cagle DO (Osteopath)         [facility] Practice Progress Note               Interval History:   Pt doing well no complaints               Review of Systems:   The 5 point Review of Systems is negative other than noted in the HPI             Medications:   I have reviewed this patient's current medications               Physical Exam:   Vitals were reviewed  Lungs:   No increased work of breathing, good air exchange, clear to auscultation bilaterally, no crackles or wheezing     Cardiovascular:   Normal apical impulse, regular rate and rhythm, normal S1 and S2, no S3 or S4, and no murmur noted     Musculoskeletal:   Generalized weakness nothing focal         Peripheral IV 06/02/17 Right Lower forearm (Active)   Site Assessment WDL 6/5/2017  6:00 AM   Line Status Saline locked 6/5/2017  6:00 AM   Phlebitis Scale 0-->no symptoms 6/5/2017  6:00 AM   Infiltration Scale 0 6/5/2017  6:00 AM   Extravasation? No 6/3/2017  1:00 PM   Dressing Intervention Dressing reinforced 6/2/2017 11:28 PM   Number of days:3       Rash 09/28/13 0211 Bilateral midline groin (Active)   Number of days:1346       Incision/Surgical Site 09/26/13 Midline Abdomen (Active)   Number of days:1348     Line/device assessment(s) completed for medical necessity         Data:   All laboratory data reviewed         Assessment and Plan:   Principal Problem:    Pneumonia of left lower lobe due to infectious organism    Assessment: resolving     Plan: will change to PO when able to dc to rehab  Active Problems:    Diabetes mellitus (H)    Assessment: good control for now     Plan: will cont long acting insulin for treatment as renal ins although improving is not appropriate    Hypertension    Assessment: cont  rolled     Plan: will monitor    General weakness    Assessment: stable    Plan: needs rehab pending referral    Acute lower urinary tract infection    Assessment: resolved    Plan: will monitor    Renal insufficiency    Assessment: stable and improving    Plan: cont to monitor and cont therapy[MC1.1]     Revision History        User Key Date/Time User Provider Type Action    > MC1.1 6/5/2017  8:19 AM Boston Cagle DO Osteopath Sign            Progress Notes by Sara Young MD at 6/4/2017  8:31 AM     Author:  Sara Young MD Service:  Family Medicine Author Type:  Physician    Filed:  6/4/2017  8:34 AM Date of Service:  6/4/2017  8:31 AM Creation Time:  6/4/2017  8:31 AM    Status:  Signed :  Sara Young MD (Physician)         [facility]  Progress Note            Subjective:   Up in chair, much less somnulent compared to yesterday. Min cough. Denies sob                 Medications:     Current Facility-Administered Medications Ordered in Epic   Medication Dose Route Frequency Last Rate Last Dose     insulin detemir (LEVEMIR) injection 10 Units  10 Units Subcutaneous At Bedtime         benzocaine (ORAJEL/ANBESOL) 10 % gel   Mouth/Throat Q3H PRN         glucose 40 % gel 15-30 g  15-30 g Oral Q15 Min PRN        Or     dextrose 50 % injection 25-50 mL  25-50 mL Intravenous Q15 Min PRN        Or     glucagon injection 1 mg  1 mg Subcutaneous Q15 Min PRN         insulin aspart (NovoLOG) inj (RAPID ACTING)  1-7 Units Subcutaneous TID AC   1 Units at 06/03/17 1632     insulin aspart (NovoLOG) inj (RAPID ACTING)  1-5 Units Subcutaneous At Bedtime   1 Units at 06/03/17 2210     levofloxacin (LEVAQUIN) infusion 250 mg  250 mg Intravenous Q48H         multivitamin (OCUVITE) tablet 1 tablet  1 tablet Oral BID   1 tablet at 06/03/17 1627     hypromellose-dextran (ARTIFICAL TEARS) ophthalmic solution 1 drop  1 drop Both Eyes Q2H PRN         0.9% sodium chloride infusion    Intravenous Continuous 50 mL/hr at 06/03/17 0928       naloxone (NARCAN) injection 0.1-0.4 mg  0.1-0.4 mg Intravenous Q2 Min PRN         enoxaparin (LOVENOX) injection 30 mg  30 mg Subcutaneous Q24H   30 mg at 06/03/17 1627     acetaminophen (TYLENOL) tablet 325 mg  325 mg Oral Q6H PRN   325 mg at 06/03/17 1240     amLODIPine (NORVASC) tablet 7.5 mg  7.5 mg Oral Daily   7.5 mg at 06/03/17 0926     aspirin EC EC tablet 81 mg  81 mg Oral Daily   81 mg at 06/03/17 0922     carvedilol (COREG) tablet 6.25 mg  6.25 mg Oral BID w/meals   6.25 mg at 06/03/17 1637     fluticasone-vilanterol (BREO ELLIPTA) 100-25 MCG/INH oral inhaler 1 puff  1 puff Inhalation Daily   1 puff at 06/03/17 1017     furosemide (LASIX) tablet 40 mg  40 mg Oral Daily   40 mg at 06/03/17 0925     gabapentin (NEURONTIN) capsule 400 mg  400 mg Oral BID   400 mg at 06/03/17 2204     mirtazapine (REMERON) tablet 30 mg  30 mg Oral At Bedtime   30 mg at 06/03/17 2204     nitroglycerin (NITROSTAT) sublingual tablet 0.4 mg  0.4 mg Sublingual Q5 Min PRN         oxybutynin (DITROPAN) tablet 10 mg  10 mg Oral At Bedtime   10 mg at 06/03/17 2205     polyethylene glycol (MIRALAX/GLYCOLAX) Packet 17 g  17 g Oral Every Other Day   17 g at 06/03/17 0915     simvastatin (ZOCOR) tablet 20 mg  20 mg Oral At Bedtime   20 mg at 06/03/17 2205     traMADol (ULTRAM) tablet 50 mg  50 mg Oral Q6H PRN         cholecalciferol (vitamin D) tablet 1,000 Units  1,000 Units Oral Daily   1,000 Units at 06/03/17 0924     glucose 40 % gel 15-30 g  15-30 g Oral Q15 Min PRN        Or     dextrose 50 % injection 25-50 mL  25-50 mL Intravenous Q15 Min PRN        Or     glucagon injection 1 mg  1 mg Subcutaneous Q15 Min PRN         No current Epic-ordered outpatient prescriptions on file.       Review of Systems:   C: NEGATIVE for fever, chills, change in weight  E/M: NEGATIVE for ear, mouth and throat problems  R: NEGATIVE for significant cough or SOB  CV: NEGATIVE for chest pain,  palpitations or peripheral edema               Physical Exam:   Vitals were reviewed  Patient Vitals for the past 24 hrs:   BP Temp Temp src Heart Rate Resp SpO2   06/04/17 0825 144/63 97  F (36.1  C) Tympanic 81 18 95 %   06/03/17 2218 134/59 98  F (36.7  C) Tympanic 74 18 96 %   06/03/17 1637 (!) 107/49 97.9  F (36.6  C) Tympanic 74 22 -   06/03/17 1017 - - - - - 93 %   06/03/17 0920 103/54 97.7  F (36.5  C) Tympanic 88 18 92 %     Constitutional: Awake, alert, cooperative.  Eyes:  sclera clear  Lungs: clear.   Cardiovascular: Regular rate and rhythm, normal S1 and S2, no S3 or S4, and no murmur noted.  Abdomen: nondistended  Neurologic: Awake, alert, oriented to name  Neuropsychiatric: Normal affect, mood, orientation, memory and insight.  Ext-no edema    Peripheral IV 06/02/17 Right Lower forearm (Active)   Site Assessment WDL except 6/4/2017  8:27 AM   Line Status Infusing 6/4/2017  8:27 AM   Phlebitis Scale 0-->no symptoms 6/4/2017  8:27 AM   Infiltration Scale 0 6/4/2017  8:27 AM   Extravasation? No 6/3/2017  1:00 PM   Dressing Intervention Dressing reinforced 6/2/2017 11:28 PM   Number of days:2       Rash 09/28/13 0211 Bilateral midline groin (Active)   Number of days:1345       Incision/Surgical Site 09/26/13 Midline Abdomen (Active)   Number of days:1347     Line/device assessment(s) completed for medical necessity         Data:     Results for orders placed or performed during the hospital encounter of 06/02/17 (from the past 24 hour(s))   Glucose by meter   Result Value Ref Range    Glucose 205 (H) 70 - 99 mg/dL   Glucose by meter   Result Value Ref Range    Glucose 170 (H) 70 - 99 mg/dL   Glucose by meter   Result Value Ref Range    Glucose 236 (H) 70 - 99 mg/dL   Glucose by meter   Result Value Ref Range    Glucose 185 (H) 70 - 99 mg/dL   Basic metabolic panel   Result Value Ref Range    Sodium 137 133 - 144 mmol/L    Potassium 4.6 3.4 - 5.3 mmol/L    Chloride 104 94 - 109 mmol/L    Carbon Dioxide 26  20 - 32 mmol/L    Anion Gap 7 3 - 14 mmol/L    Glucose 188 (H) 70 - 99 mg/dL    Urea Nitrogen 48 (H) 7 - 30 mg/dL    Creatinine 1.40 (H) 0.52 - 1.04 mg/dL    GFR Estimate 35 (L) >60 mL/min/1.7m2    GFR Estimate If Black 43 (L) >60 mL/min/1.7m2    Calcium 8.2 (L) 8.5 - 10.1 mg/dL   Lactic acid   Result Value Ref Range    Lactic Acid 1.8 0.4 - 2.0 mmol/L     All cardiac studies reviewed by me.  All imaging studies reviewed by me.         Assessment and Plan:   Principal Problem:    Pneumonia of left lower lobe due to infectious organism   clinically improving. Anticipate discharge 1-2 days  Active Problems:    Diabetes mellitus (H)  Cr improving but will cont to hold metformin. Using sliding scale but still high so will adjust dose of levimir    Hypertension    bp stable    General weakness  PT suggested short vs long term NH placement.  has seen regarding. Will see what is avail tomorrow. Dr. Cagle to be updated    Lower UTI  cx shows ecoli sensitive to levaquin    Renal insuff  Cont to improve[AH1.1]            Revision History        User Key Date/Time User Provider Type Action    > AH1.1 6/4/2017  8:34 AM Sara Young MD Physician Sign                  Procedure Notes     No notes of this type exist for this encounter.         Progress Notes - Therapies (Notes from 06/02/17 through 06/05/17)      Progress Notes by Lila Nichols PT at 6/4/2017 11:49 AM     Author:  Lila Nichols PT Service:  Physical Medicine and Rehabilitation Author Type:  Physical Therapist    Filed:  6/4/2017 11:50 AM Date of Service:  6/4/2017 11:49 AM Creation Time:  6/4/2017 11:49 AM    Status:  Signed :  Lila Nichols PT (Physical Therapist)         GAIT/AMBULATION: Pt ambul with FWW x 120 ft x 2 req min asst one and w/c push of another. Pt slightly SOB, p02 sats 87% at the lowest. Encouraged deep breathing during seated rest  EQUIPMENT NEEDS AT DISCHARGE: TBD  D/C RECOMMENDATIONS:  Possible SNF short term stay.    COMMENTS: Pt demonstrates continued slight confusion. Pt had a fall this am when attempting to go to bathroom by herself. No signif pain or apparent injuries.[DC1.1]         Revision History        User Key Date/Time User Provider Type Action    > DC1.1 6/4/2017 11:50 AM Lila Nichols, PT Physical Therapist Sign            Progress Notes by Lila Nichols PT at 6/3/2017 11:33 AM     Author:  Lila Nichols PT Service:  Physical Medicine and Rehabilitation Author Type:  Physical Therapist    Filed:  6/3/2017 11:35 AM Date of Service:  6/3/2017 11:33 AM Creation Time:  6/3/2017 11:33 AM    Status:  Signed :  Lila Nichols PT (Physical Therapist)         GAIT/AMBULATION: Pt ambul x 20 ft x 2 with Wh walker req min asst one. Pt fatigued following gait and p02 sats consistently over 90% on room air.  EQUIPMENT NEEDS AT DISCHARGE: *TBD  D/C RECOMMENDATIONS: Possible SNF for short term rehab vs LTC facility  TREATMENT AT D/C: TBD[DC1.1]         Revision History        User Key Date/Time User Provider Type Action    > DC1.1 6/3/2017 11:35 AM Lila Nichols, PT Physical Therapist Sign            Progress Notes by Lila Nichols PT at 6/3/2017 11:33 AM     Author:  Lila Nichols, PT Service:  Physical Medicine and Rehabilitation Author Type:  Physical Therapist    Filed:  6/3/2017 11:33 AM Date of Service:  6/3/2017 11:33 AM Creation Time:  6/3/2017 11:33 AM    Status:  Signed :  Lila Nichols, PT (Physical Therapist)          06/03/17 1000   Quick Adds   Type of Visit Initial PT Evaluation   Living Environment   Lives With alone   Living Arrangements assisted living   Home Accessibility no concerns   Number of Stairs to Enter Home 0   Number of Stairs Within Home 0   Stair Railings at Home inside, present on right side   Transportation Available family or friend will provide   Living Environment Comment Pt living at [facility] in [place]   Self-Care   Dominant Hand right   Usual Activity Tolerance moderate   Current Activity Tolerance fair   Regular Exercise no   Equipment Currently Used at Home walker, rolling   Activity/Exercise/Self-Care Comment per chart, pt unable to consistently give accurate hx   Functional Level Prior   Ambulation 0-->independent   Transferring 0-->independent   Toileting 0-->independent   Bathing 0-->independent   Dressing 0-->independent   Eating 0-->independent   Communication 0-->understands/communicates without difficulty   Swallowing 0-->swallows foods/liquids without difficulty   Cognition 1 - attention or memory deficits   Fall history within last six months yes   Number of times patient has fallen within last six months 1   Which of the above functional risks had a recent onset or change? fall history   Prior Functional Level Comment Son reports pt falling frequently at [facility]. Reports she had to crawl out of her apartment to get help after this recent fall. Reports he thinks she needs more assistance   General Information   Onset of Illness/Injury or Date of Surgery - Date 06/02/17   Referring Physician Dr An Doran   Patient/Family Goals Statement get better   Pertinent History of Current Problem (include personal factors and/or comorbidities that impact the POC) This 87 y/o female was hosp yesterday with fall at [facility] and diagnosed with pneumonia.    Precautions/Limitations fall precautions   General Observations confused and is aware of confusion   Cognitive Status Examination   Orientation person   Level of Consciousness alert;confused   Follows Commands and Answers Questions 100% of the time   Personal Safety and Judgment impaired   Memory impaired   Pain Assessment   Patient Currently in Pain No   Strength   Strength Comments R hip 3+/5, L hip 3/5, R knee 4/5, L knee 3/5, R/L ankles 3+/5   Bed Mobility   Bed Mobility Comments Indep in rolling. Sit/supine  "req min asst one   Transfer Skills   Transfer Comments Sit to stand req min asst one. Pivot transfer req min asst one. Toileted req min asst one with pt able to wash hands, toilet cares indep.   Gait   Gait Comments Pt ambul with wh walker x 20 ft x 2 req min asst one with p02 sats over 90% consistently, on room air.   Balance   Balance Comments D/t previous falls, pt is fall risk. Req min asst and belt at all times.    General Therapy Interventions   Planned Therapy Interventions strengthening;gait training   Intervention Comments education   Clinical Impression   Criteria for Skilled Therapeutic Intervention yes, treatment indicated   PT Diagnosis SIgnif weakness, decrd balance, and debility d/t pneumonia   Influenced by the following impairments weakness, debility, confusion, fall   Functional limitations due to impairments ambulation, adls, transfers   Clinical Presentation Evolving/Changing   Clinical Decision Making (Complexity) Low complexity   Therapy Frequency` daily   Predicted Duration of Therapy Intervention (days/wks) 7 days   Anticipated Equipment Needs at Discharge (to be determined)   Anticipated Discharge Disposition Other (see comments)  (probable return to asst living facility or SNF)   Risk & Benefits of therapy have been explained Yes   Patient, Family & other staff in agreement with plan of care Yes   Clinical Impression Comments Pt will benefit from daily PT while inpatient to increase strength, transfers, ambulation and decr fall risk   [facility] AM-PAC TM \"6 Clicks\"   2016, Trustees of [facility], under license to [employer].  All rights reserved.   6 Clicks Short Forms Basic Mobility Inpatient Short Form   [facility] AM-PAC  \"6 Clicks\" V.2 Basic Mobility Inpatient Short Form   1. Turning from your back to your side while in a flat bed without using bedrails? 3 - A Little   2. Moving from lying on your back to sitting on the side of a flat bed without using bedrails? " 3 - A Little   3. Moving to and from a bed to a chair (including a wheelchair)? 3 - A Little   4. Standing up from a chair using your arms (e.g., wheelchair, or bedside chair)? 3 - A Little   5. To walk in hospital room? 3 - A Little   6. Climbing 3-5 steps with a railing? 1 - Total   Basic Mobility Raw Score (Score out of 24.Lower scores equate to lower levels of function) 16   Total Evaluation Time   Total Evaluation Time (Minutes) 20      06/03/17 1000   Quick Adds   Type of Visit Initial PT Evaluation   Living Environment   Lives With alone   Living Arrangements assisted living   Home Accessibility no concerns   Number of Stairs to Enter Home 0   Number of Stairs Within Home 0   Stair Railings at Home inside, present on right side   Transportation Available family or friend will provide   Living Environment Comment Pt living at [facility] in [place]   Self-Care   Dominant Hand right   Usual Activity Tolerance moderate   Current Activity Tolerance fair   Regular Exercise no   Equipment Currently Used at Home walker, rolling   Activity/Exercise/Self-Care Comment per chart, pt unable to consistently give accurate hx   Functional Level Prior   Ambulation 0-->independent   Transferring 0-->independent   Toileting 0-->independent   Bathing 0-->independent   Dressing 0-->independent   Eating 0-->independent   Communication 0-->understands/communicates without difficulty   Swallowing 0-->swallows foods/liquids without difficulty   Cognition 1 - attention or memory deficits   Fall history within last six months yes   Number of times patient has fallen within last six months 1   Which of the above functional risks had a recent onset or change? fall history   Prior Functional Level Comment Son reports pt falling frequently at [facility]. Reports she had to crawl out of her apartment to get help after this recent fall. Reports he thinks she needs more assistance   General Information   Onset of  Illness/Injury or Date of Surgery - Date 06/02/17   Referring Physician Dr An Doran   Patient/Family Goals Statement get better   Pertinent History of Current Problem (include personal factors and/or comorbidities that impact the POC) This 87 y/o female was hosp yesterday with fall at [facility] and diagnosed with pneumonia.    Precautions/Limitations fall precautions   General Observations confused and is aware of confusion   Cognitive Status Examination   Orientation person   Level of Consciousness alert;confused   Follows Commands and Answers Questions 100% of the time   Personal Safety and Judgment impaired   Memory impaired   Pain Assessment   Patient Currently in Pain No   Strength   Strength Comments R hip 3+/5, L hip 3/5, R knee 4/5, L knee 3/5, R/L ankles 3+/5   Bed Mobility   Bed Mobility Comments Indep in rolling. Sit/supine req min asst one   Transfer Skills   Transfer Comments Sit to stand req min asst one. Pivot transfer req min asst one. Toileted req min asst one with pt able to wash hands, toilet cares indep.   Gait   Gait Comments Pt ambul with wh walker x 20 ft x 2 req min asst one with p02 sats over 90% consistently, on room air.   Balance   Balance Comments D/t previous falls, pt is fall risk. Req min asst and belt at all times.    General Therapy Interventions   Planned Therapy Interventions strengthening;gait training   Intervention Comments education   Clinical Impression   Criteria for Skilled Therapeutic Intervention yes, treatment indicated   PT Diagnosis SIgnif weakness, decrd balance, and debility d/t pneumonia   Influenced by the following impairments weakness, debility, confusion, fall   Functional limitations due to impairments ambulation, adls, transfers   Clinical Presentation Evolving/Changing   Clinical Decision Making (Complexity) Low complexity   Therapy Frequency` daily   Predicted Duration of Therapy Intervention (days/wks) 7 days   Anticipated Equipment Needs  "at Discharge (to be determined)   Anticipated Discharge Disposition Other (see comments)  (probable return to asst living facility or SNF)   Risk & Benefits of therapy have been explained Yes   Patient, Family & other staff in agreement with plan of care Yes   Clinical Impression Comments Pt will benefit from daily PT while inpatient to increase strength, transfers, ambulation and decr fall risk   [facility]-PAC TM \"6 Clicks\"   2016, Trustees of [facility], under license to [employer].  All rights reserved.   6 Clicks Short Forms Basic Mobility Inpatient Short Form   [facility] AM-PAC  \"6 Clicks\" V.2 Basic Mobility Inpatient Short Form   1. Turning from your back to your side while in a flat bed without using bedrails? 3 - A Little   2. Moving from lying on your back to sitting on the side of a flat bed without using bedrails? 3 - A Little   3. Moving to and from a bed to a chair (including a wheelchair)? 3 - A Little   4. Standing up from a chair using your arms (e.g., wheelchair, or bedside chair)? 3 - A Little   5. To walk in hospital room? 3 - A Little   6. Climbing 3-5 steps with a railing? 1 - Total   Basic Mobility Raw Score (Score out of 24.Lower scores equate to lower levels of function) 16   Total Evaluation Time   Total Evaluation Time (Minutes) 20[DC1.1]        Revision History        User Key Date/Time User Provider Type Action    > DC1.1 6/3/2017 11:33 AM Lila Nichols, PT Physical Therapist Sign                                                      INTERAGENCY TRANSFER FORM - LAB / IMAGING / EKG / EMG RESULTS   6/2/2017                       [facility]: [phone]            Unresulted Labs (24h ago through future)    Start       Ordered    06/05/17 0600  Platelet count  (Pharmacological Prophylaxis - enoxaparin (LOVENOX) *Use only if creatinine clearance is greater than 30 mL/min)  EVERY THREE DAYS,   Routine     Comments:  Repeat every 3 days while on VTE " prophylaxis.  Notify provider and hold enoxaparin if platelet count falls by 50% of baseline. If no result is listed, this lab has not been done the past 365 days. LATEST LAB RESULT: Platelet Count (10e9/L)       Date                     Value                 06/02/2017               202              ----------    06/02/17 1355    06/05/17 0000  Creatinine  (Pharmacological Prophylaxis - enoxaparin (LOVENOX) *Use only if creatinine clearance is greater than 30 mL/min)  EVERY THREE DAYS,   Routine     Comments:  Repeat every 3 days while on VTE prophylaxis.    06/02/17 1355         Lab Results - 3 Days      Glucose by meter [[id number]] (Abnormal)  Resulted: 06/05/17 1146, Result status: Final result    Ordering provider: Boston Cagle DO  06/05/17 1139 Resulting lab: POINT OF CARE TEST, GLUCOSE    Specimen Information    Type Source Collected On     06/05/17 1139          Components       Value Reference Range Flag Lab   Glucose 254 70 - 99 mg/dL H 170            Blood culture [[id number]]  Resulted: 06/05/17 1107, Result status: Preliminary result    Ordering provider: Dawson Dennis MD  06/02/17 1103 Resulting lab: [facility]    Specimen Information    Type Source Collected On   Blood  06/02/17 1130          Components       Value Reference Range Flag Lab   Specimen Description Blood Left Hand   HI   Special Requests PEDS BOTTLE ONLY   HI   Culture Micro No growth after 3 days   HI   Micro Report Status Pending   HI            Blood culture [[id number]]  Resulted: 06/05/17 1107, Result status: Preliminary result    Ordering provider: Dawson Dennis MD  06/02/17 1103 Resulting lab: [facility]    Specimen Information    Type Source Collected On   Blood  06/02/17 1140          Components       Value Reference Range Flag Lab   Specimen Description Blood Arm   HI   Special Requests AEROBIC 6ML ,ANAEROBIC 2 M.   HI   Culture Micro No growth after 3 days   HI   Micro  Report Status Pending   HI            Glucose by meter [[id number]] (Abnormal)  Resulted: 06/05/17 0745, Result status: Final result    Ordering provider: Boston Cagle,   06/05/17 0741 Resulting lab: POINT OF CARE TEST, GLUCOSE    Specimen Information    Type Source Collected On     06/05/17 0741          Components       Value Reference Range Flag Lab   Glucose 169 70 - 99 mg/dL H 170            Basic metabolic panel [[id number]] (Abnormal)  Resulted: 06/05/17 0603, Result status: Final result    Ordering provider: Sara Young MD  06/04/17 2300 Resulting lab: [facility]    Specimen Information    Type Source Collected On   Blood  06/05/17 0520          Components       Value Reference Range Flag Lab   Sodium 136 133 - 144 mmol/L  HI   Potassium 4.2 3.4 - 5.3 mmol/L  HI   Chloride 102 94 - 109 mmol/L  HI   Carbon Dioxide 25 20 - 32 mmol/L  HI   Anion Gap 9 3 - 14 mmol/L  HI   Glucose 192 70 - 99 mg/dL H HI   Urea Nitrogen 37 7 - 30 mg/dL H HI   Creatinine 1.17 0.52 - 1.04 mg/dL H HI   GFR Estimate 44 >60 mL/min/1.7m2 L HI   Comment:  Non  GFR Calc   GFR Estimate If Black 53 >60 mL/min/1.7m2 L HI   Comment:  African American GFR Calc   Calcium 8.3 8.5 - 10.1 mg/dL L HI            Platelet count [[id number]] (Abnormal)  Resulted: 06/05/17 0545, Result status: Final result    Ordering provider: Boston Cagle,   06/05/17 0000 Resulting lab: [facility]    Specimen Information    Type Source Collected On   Blood  06/05/17 0520          Components       Value Reference Range Flag Lab   Platelet Count 149 150 - 450 10e9/L L HI            Glucose by meter [[id number]] (Abnormal)  Resulted: 06/05/17 0236, Result status: Final result    Ordering provider: Boston Cagle,   06/05/17 0218 Resulting lab: POINT OF CARE TEST, GLUCOSE    Specimen Information    Type Source Collected On     06/05/17 0218          Components       Value Reference  Range Flag Lab   Glucose 276 70 - 99 mg/dL H 170            Glucose by meter [[id number]] (Abnormal)  Resulted: 06/04/17 2141, Result status: Final result    Ordering provider: Boston Cagle,   06/04/17 2129 Resulting lab: POINT OF CARE TEST, GLUCOSE    Specimen Information    Type Source Collected On     06/04/17 2129          Components       Value Reference Range Flag Lab   Glucose 220 70 - 99 mg/dL H 170            Glucose by meter [[id number]] (Abnormal)  Resulted: 06/04/17 1706, Result status: Final result    Ordering provider: Boston Cagle,   06/04/17 1646 Resulting lab: POINT OF CARE TEST, GLUCOSE    Specimen Information    Type Source Collected On     06/04/17 1646          Components       Value Reference Range Flag Lab   Glucose 172 70 - 99 mg/dL H 170            Glucose by meter [[id number]] (Abnormal)  Resulted: 06/04/17 1425, Result status: Final result    Ordering provider: Boston Cagle,   06/04/17 1418 Resulting lab: POINT OF CARE TEST, GLUCOSE    Specimen Information    Type Source Collected On     06/04/17 1418          Components       Value Reference Range Flag Lab   Glucose 220 70 - 99 mg/dL H 170            Glucose by meter [[id number]] (Abnormal)  Resulted: 06/04/17 0841, Result status: Final result    Ordering provider: Boston Cagle,   06/04/17 0829 Resulting lab: POINT OF CARE TEST, GLUCOSE    Specimen Information    Type Source Collected On     06/04/17 0829          Components       Value Reference Range Flag Lab   Glucose 154 70 - 99 mg/dL H 170            Urine Culture Aerobic Bacterial [[id number]] (Abnormal)  Resulted: 06/04/17 0717, Result status: Final result    Ordering provider: Dawson Dennis MD  06/02/17 0943 Resulting lab: [facility]    Specimen Information    Type Source Collected On   Urine  06/02/17 1035          Components       Value Reference Range Flag Lab   Specimen Description Catheterized Urine   HI   Culture  Micro >100,000 colonies/mL Escherichia coli  A HI   Micro Report Status FINAL 06/04/2017   HI   Organism: >100,000 colonies/mL Escherichia coli   HI            Active MRSA Surveillance Culture [[id number]]  Resulted: 06/04/17 0635, Result status: Final result    Ordering provider: Boston Cagle DO  06/02/17 1355 Resulting lab: [facility]    Specimen Information    Type Source Collected On   Swab Nasopharyngeal 06/02/17 1509          Components       Value Reference Range Flag Lab   Specimen Description Nares   HI   Culture Micro No MRSA isolated   HI   Micro Report Status FINAL 06/04/2017   HI            Lactic acid [[id number]]  Resulted: 06/04/17 0544, Result status: Final result    Ordering provider: Sara Young MD  06/03/17 2300 Resulting lab: [facility]    Specimen Information    Type Source Collected On   Blood  06/04/17 0519          Components       Value Reference Range Flag Lab   Lactic Acid 1.8 0.4 - 2.0 mmol/L  HI            Basic metabolic panel [[id number]] (Abnormal)  Resulted: 06/04/17 0542, Result status: Final result    Ordering provider: Sara Young MD  06/03/17 2300 Resulting lab: [facility]    Specimen Information    Type Source Collected On   Blood  06/04/17 0519          Components       Value Reference Range Flag Lab   Sodium 137 133 - 144 mmol/L  HI   Potassium 4.6 3.4 - 5.3 mmol/L  HI   Chloride 104 94 - 109 mmol/L  HI   Carbon Dioxide 26 20 - 32 mmol/L  HI   Anion Gap 7 3 - 14 mmol/L  HI   Glucose 188 70 - 99 mg/dL H HI   Urea Nitrogen 48 7 - 30 mg/dL H HI   Creatinine 1.40 0.52 - 1.04 mg/dL H HI   GFR Estimate 35 >60 mL/min/1.7m2 L HI   Comment:  Non  GFR Calc   GFR Estimate If Black 43 >60 mL/min/1.7m2 L HI   Comment:  African American GFR Calc   Calcium 8.2 8.5 - 10.1 mg/dL L HI            Glucose by meter [[id number]] (Abnormal)  Resulted: 06/04/17 0151, Result status: Final result     Ordering provider: Boston Cagle,   06/04/17 0145 Resulting lab: POINT OF CARE TEST, GLUCOSE    Specimen Information    Type Source Collected On     06/04/17 0145          Components       Value Reference Range Flag Lab   Glucose 185 70 - 99 mg/dL H 170            Glucose by meter [[id number]] (Abnormal)  Resulted: 06/03/17 2226, Result status: Final result    Ordering provider: Boston Cagle,   06/03/17 2207 Resulting lab: POINT OF CARE TEST, GLUCOSE    Specimen Information    Type Source Collected On     06/03/17 2207          Components       Value Reference Range Flag Lab   Glucose 236 70 - 99 mg/dL H 170            Glucose by meter [[id number]] (Abnormal)  Resulted: 06/03/17 1716, Result status: Final result    Ordering provider: Boston Cagle,   06/03/17 1632 Resulting lab: POINT OF CARE TEST, GLUCOSE    Specimen Information    Type Source Collected On     06/03/17 1632          Components       Value Reference Range Flag Lab   Glucose 170 70 - 99 mg/dL H 170            Glucose by meter [[id number]] (Abnormal)  Resulted: 06/03/17 1221, Result status: Final result    Ordering provider: Boston Cagle,   06/03/17 1204 Resulting lab: POINT OF CARE TEST, GLUCOSE    Specimen Information    Type Source Collected On     06/03/17 1204          Components       Value Reference Range Flag Lab   Glucose 205 70 - 99 mg/dL H 170            Lactic acid [[id number]] (Abnormal)  Resulted: 06/03/17 0645, Result status: Edited Result - FINAL    Ordering provider: Sara Young MD  06/02/17 2300 Resulting lab: [facility]    Specimen Information    Type Source Collected On   Blood  06/03/17 0529          Components       Value Reference Range Flag Lab   Lactic Acid 2.3 0.4 - 2.0 mmol/L H HI   Comment:         SIGNIFICANT VALUE CALLED TO KIET AT 0645 BY ADRIANNA  CORRECTED ON 06/03 AT 0645: PREVIOUSLY REPORTED AS 2.3              Basic metabolic panel [[id number]] (Abnormal)   Resulted: 06/03/17 0554, Result status: Final result    Ordering provider: Boston Cagle, DO  06/03/17 0001 Resulting lab: [facility]    Specimen Information    Type Source Collected On   Blood  06/03/17 0529          Components       Value Reference Range Flag Lab   Sodium 136 133 - 144 mmol/L  HI   Comment:  Specimen slightly hemolyzed   Potassium 4.6 3.4 - 5.3 mmol/L  HI   Chloride 102 94 - 109 mmol/L  HI   Carbon Dioxide 25 20 - 32 mmol/L  HI   Anion Gap 9 3 - 14 mmol/L  HI   Glucose 118 70 - 99 mg/dL H HI   Urea Nitrogen 69 7 - 30 mg/dL H HI   Creatinine 1.61 0.52 - 1.04 mg/dL H HI   GFR Estimate 30 >60 mL/min/1.7m2 L HI   Comment:  Non  GFR Calc   GFR Estimate If Black 37 >60 mL/min/1.7m2 L HI   Comment:  African American GFR Calc   Calcium 8.3 8.5 - 10.1 mg/dL L HI            CBC with platelets differential [[id number]] (Abnormal)  Resulted: 06/03/17 0536, Result status: Final result    Ordering provider: Boston Cagle, DO  06/03/17 0001 Resulting lab: [facility]    Specimen Information    Type Source Collected On   Blood  06/03/17 0529          Components       Value Reference Range Flag Lab   WBC 3.8 4.0 - 11.0 10e9/L L HI   RBC Count 4.04 3.8 - 5.2 10e12/L  HI   Hemoglobin 13.1 11.7 - 15.7 g/dL  HI   Hematocrit 37.9 35.0 - 47.0 %  HI   MCV 94 78 - 100 fl  HI   MCH 32.4 26.5 - 33.0 pg  HI   MCHC 34.6 31.5 - 36.5 g/dL  HI   RDW 12.8 10.0 - 15.0 %  HI   Platelet Count 166 150 - 450 10e9/L  HI   Diff Method Automated Method   HI   % Neutrophils 39.2 %  HI   % Lymphocytes 45.5 %  HI   % Monocytes 12.4 %  HI   % Eosinophils 2.1 %  HI   % Basophils 0.5 %  HI   % Immature Granulocytes 0.3 %  HI   Nucleated RBCs 0 0 /100  HI   Absolute Neutrophil 1.5 1.6 - 8.3 10e9/L L HI   Absolute Lymphocytes 1.7 0.8 - 5.3 10e9/L  HI   Absolute Monocytes 0.5 0.0 - 1.3 10e9/L  HI   Absolute Eosinophils 0.1 0.0 - 0.7 10e9/L  HI   Absolute Basophils 0.0 0.0 - 0.2  10e9/L  HI   Abs Immature Granulocytes 0.0 0 - 0.4 10e9/L  HI   Absolute Nucleated RBC 0.0   HI            Glucose by meter [[id number]] (Abnormal)  Resulted: 06/02/17 2156, Result status: Final result    Ordering provider: Boston Cagle,   06/02/17 2147 Resulting lab: POINT OF CARE TEST, GLUCOSE    Specimen Information    Type Source Collected On     06/02/17 2147          Components       Value Reference Range Flag Lab   Glucose 127 70 - 99 mg/dL H 170            Glucose by meter [[id number]] (Abnormal)  Resulted: 06/02/17 2156, Result status: Final result    Ordering provider: Boston Cagle, DO  06/02/17 1650 Resulting lab: POINT OF CARE TEST, GLUCOSE    Specimen Information    Type Source Collected On     06/02/17 1650          Components       Value Reference Range Flag Lab   Glucose 132 70 - 99 mg/dL H 170            Lactic acid [[id number]] (Abnormal)  Resulted: 06/02/17 1433, Result status: Edited Result - FINAL    Ordering provider: Dawson Dennis MD  06/02/17 1205 Resulting lab: [facility]    Specimen Information    Type Source Collected On   Blood  06/02/17 1404          Components       Value Reference Range Flag Lab   Lactic Acid 2.4 0.4 - 2.0 mmol/L H HI   Comment:  SIGNIFICANT RESULT CALLED TO ESTHER AYALA AT 1430 BY ERW ON 6/2/2017            Ketone Beta-Hydroxybutyrate Quantitative [[id number]]  Resulted: 06/02/17 1235, Result status: Final result    Ordering provider: Dawson Dennis MD  06/02/17 1010 Resulting lab: [facility]    Specimen Information    Type Source Collected On   Blood  06/02/17 1225          Components       Value Reference Range Flag Lab   Ketone Quantitative 0.3 0.0 - 0.6 mmol/L  HI            Partial thromboplastin time [[id number]]  Resulted: 06/02/17 1133, Result status: Final result    Ordering provider: Dawson Dennis MD  06/02/17 0943 Resulting lab: [facility]    Specimen Information    Type Source  Collected On   Blood  06/02/17 0957          Components       Value Reference Range Flag Lab   PTT 26 24.00 - 37.00 sec  HI            INR [[id number]]  Resulted: 06/02/17 1133, Result status: Final result    Ordering provider: Dawson Dennis MD  06/02/17 0943 Resulting lab: [facility]    Specimen Information    Type Source Collected On   Blood  06/02/17 0957          Components       Value Reference Range Flag Lab   INR 1.15 0.80 - 1.20  HI            D-Dimer (FV Range) [[id number]] (Abnormal)  Resulted: 06/02/17 1133, Result status: Final result    Ordering provider: Dawson Dennis MD  06/02/17 0943 Resulting lab: [facility]    Specimen Information    Type Source Collected On   Blood  06/02/17 0957          Components       Value Reference Range Flag Lab   D-Dimer ng/mL 1737 0 - 300 ng/ml D-DU H HI            UA with Microscopic [[id number]] (Abnormal)  Resulted: 06/02/17 1045, Result status: Final result    Ordering provider: Dawson Dennis MD  06/02/17 0943 Resulting lab: [facility]    Specimen Information    Type Source Collected On   Urine  06/02/17 1035          Components       Value Reference Range Flag Lab   Color Urine Yellow   HI   Appearance Urine Clear   HI   Glucose Urine Negative NEG mg/dL  HI   Bilirubin Urine Negative NEG  HI   Ketones Urine Negative NEG mg/dL  HI   Specific Gravity Urine 1.006 1.003 - 1.035  HI   Blood Urine Negative NEG  HI   pH Urine 5.0 4.7 - 8.0 pH  HI   Protein Albumin Urine Negative NEG mg/dL  HI   Urobilinogen mg/dL Normal 0.0 - 2.0 mg/dL  HI   Nitrite Urine Positive NEG A HI   Leukocyte Esterase Urine Small NEG A HI   Source Catheterized Urine   HI   WBC Urine 2 0 - 2 /HPF  HI   RBC Urine <1 0 - 2 /HPF  HI   Bacteria Urine None NEG /HPF A HI   Mucous Urine Present NEG /LPF A HI   Hyaline Casts 5 0 - 2 /LPF A HI   Amorphous Crystals Moderate NEG /HPF A HI            Lactic acid [[id number]] (Abnormal)  Resulted:  06/02/17 1037, Result status: Edited Result - FINAL    Ordering provider: Dawson Dennis MD  06/02/17 0943 Resulting lab: [facility]    Specimen Information    Type Source Collected On   Blood  06/02/17 0957          Components       Value Reference Range Flag Lab   Lactic Acid 3.1 0.4 - 2.0 mmol/L H HI   Comment:         CHANG AN AT 1036 ON 06/01/17 AJE  CORRECTED ON 06/02 AT 1037: PREVIOUSLY REPORTED AS 3.1              Comprehensive metabolic panel [[id number]] (Abnormal)  Resulted: 06/02/17 1036, Result status: Final result    Ordering provider: Dawson Dennis MD  06/02/17 0943 Resulting lab: [facility]    Specimen Information    Type Source Collected On   Blood  06/02/17 0957          Components       Value Reference Range Flag Lab   Sodium 131 133 - 144 mmol/L L HI   Potassium 5.3 3.4 - 5.3 mmol/L  HI   Comment:  Specimen slightly hemolyzed   Chloride 96 94 - 109 mmol/L  HI   Carbon Dioxide 22 20 - 32 mmol/L  HI   Anion Gap 13 3 - 14 mmol/L  HI   Glucose 196 70 - 99 mg/dL H HI   Urea Nitrogen 82 7 - 30 mg/dL H HI   Creatinine 2.00 0.52 - 1.04 mg/dL H HI   GFR Estimate 24 >60 mL/min/1.7m2 L HI   Comment:  Non  GFR Calc   GFR Estimate If Black 28 >60 mL/min/1.7m2 L HI   Comment:  African American GFR Calc   Calcium 9.4 8.5 - 10.1 mg/dL  HI   Bilirubin Total 0.5 0.2 - 1.3 mg/dL  HI   Albumin 3.3 3.4 - 5.0 g/dL L HI   Protein Total 8.0 6.8 - 8.8 g/dL  HI   Alkaline Phosphatase 96 40 - 150 U/L  HI   ALT 33 0 - 50 U/L  HI   AST 34 0 - 45 U/L  HI   Comment:         Specimen is hemolyzed which can falsely elevate AST. Analysis of a   non-hemolyzed   specimen may result in a lower value.              TSH [[id number]]  Resulted: 06/02/17 1036, Result status: Final result    Ordering provider: Dawson Dennis MD  06/02/17 0955 Resulting lab: [facility]    Specimen Information    Type Source Collected On   Blood  06/02/17 0957           Components       Value Reference Range Flag Lab   TSH 1.65 0.40 - 4.00 mU/L  HI            Glucose by meter [[id number]] (Abnormal)  Resulted: 06/02/17 1011, Result status: Final result    Ordering provider: Dawson Dennis MD  06/02/17 0936 Resulting lab: POINT OF CARE TEST, GLUCOSE    Specimen Information    Type Source Collected On     06/02/17 0936          Components       Value Reference Range Flag Lab   Glucose 170 70 - 99 mg/dL H 170            CBC with platelets differential [[id number]] (Abnormal)  Resulted: 06/02/17 1007, Result status: Final result    Ordering provider: Dawson Dennis MD  06/02/17 0943 Resulting lab: [facility]    Specimen Information    Type Source Collected On   Blood  06/02/17 0957          Components       Value Reference Range Flag Lab   WBC 5.3 4.0 - 11.0 10e9/L  HI   RBC Count 4.33 3.8 - 5.2 10e12/L  HI   Hemoglobin 14.4 11.7 - 15.7 g/dL  HI   Hematocrit 40.2 35.0 - 47.0 %  HI   MCV 93 78 - 100 fl  HI   MCH 33.3 26.5 - 33.0 pg H HI   MCHC 35.8 31.5 - 36.5 g/dL  HI   RDW 12.9 10.0 - 15.0 %  HI   Platelet Count 202 150 - 450 10e9/L  HI   Diff Method Automated Method   HI   % Neutrophils 50.0 %  HI   % Lymphocytes 39.0 %  HI   % Monocytes 8.9 %  HI   % Eosinophils 1.3 %  HI   % Basophils 0.4 %  HI   % Immature Granulocytes 0.4 %  HI   Nucleated RBCs 0 0 /100  HI   Absolute Neutrophil 2.6 1.6 - 8.3 10e9/L  HI   Absolute Lymphocytes 2.1 0.8 - 5.3 10e9/L  HI   Absolute Monocytes 0.5 0.0 - 1.3 10e9/L  HI   Absolute Eosinophils 0.1 0.0 - 0.7 10e9/L  HI   Absolute Basophils 0.0 0.0 - 0.2 10e9/L  HI   Abs Immature Granulocytes 0.0 0 - 0.4 10e9/L  HI   Absolute Nucleated RBC 0.0   HI            Testing Performed By     Lab - Abbreviation Name Director Address Valid Date Range    170 - Unknown POINT OF CARE TEST, GLUCOSE Unknown Unknown 10/31/11 1114 - Present    210 - [facility] Unknown [address] [address] 05/08/15 1057 - Present                Imaging Results - 3 Days      XR Chest 2 Views [[id number]]  Resulted: 06/02/17 1050, Result status: Final result    Ordering provider: Dawson Dennis MD  06/02/17 0943 Resulted by: Nadine Suggs MD    Performed: 06/02/17 0943 - 06/02/17 1023 Resulting lab: [facility]    Narrative:           CHEST TWO VIEWS    HISTORY:  Fever.    COMPARISON:  Today's study is compared to a prior examination which is  dated November 20, 2016.    FINDINGS:  The cardiac silhouette and pulmonary vascularity are within  normal limits.  There is slight increase in density in the left lung  that appears to be associated with the lingula, partially silhouetting  the left heart border.    IMPRESSION:  SUBTLE LINGULAR PNEUMONIA.  Exam Date: Jun 02, 2017 10:23:36 AM  Author: NADINE SUGGS  This report is final and signed      Specimen Information    Type Source Collected On     06/02/17 1020            Head CT w/o contrast [[id number]]  Resulted: 06/02/17 1041, Result status: Final result    Ordering provider: Dawson Dennis MD  06/02/17 0943 Resulted by: Nadine Suggs MD    Performed: 06/02/17 1000 - 06/02/17 1012 Resulting lab: [facility]    Narrative:           HEAD CT    HISTORY:  Slurred speech.    COMPARISON:  Today's study is compared to a prior examination which is  dated May 14, 2017.    FINDINGS:  The ventricles and sulci are mildly prominent.  White  matter changes are similar in appearance.  Bony structures are  unremarkable.    IMPRESSION:  1.  NO EVIDENCE OF ACUTE ABNORMALITY.    2.  MILD GENERALIZED ATROPHY AND SMALL-VESSEL DISEASE ARE SIMILAR IN  APPEARANCE COMPARED TO THE PREVIOUS STUDY.  Exam Date: Jun 02, 2017 10:12:00 AM  Author: NADINE SUGGS  This report is final and signed      Specimen Information    Type Source Collected On     06/02/17 1007            Testing Performed By     Lab - Abbreviation Name Director Address Valid Date Range    183 - [facility] Unknown Unknown 11/29/12  1227 - Present            Encounter-Level Documents:     There are no encounter-level documents.      Order-Level Documents:     There are no order-level documents.

## 2017-06-02 NOTE — PROVIDER NOTIFICATION
Paged pt's primary MD at 1641 in regards to patients lactic acid of 2.4. No return phone call.   Paged on call MD at 1657 MD, MD updated in regards to patients lactic acid of 2.4. MD to place orders accordingly.

## 2017-06-02 NOTE — ED PROVIDER NOTES
History     Chief Complaint   Patient presents with     Fall     HPI  Louise Johnson is a 88 year old female who comes to the emergency department accompanied by the son.  Patient is resident of assisted-living facility, son has noticed progressively worsening generalized weakness, confusion and slowed mentation.  Patient has not eaten well in the last 2 days despite being diabetic.  She has thrown up a couple of times in the last 2 days.  She denies abdominal pain, dysuria/ frequency, fever or diarrhea.  She is not complaining of pains anywhere in the body. Son reports that patient's general condition and mentation has progressively worsened over the last 1 week.  Apparently she fell down at the assisted living facility yesterday, she is not complaining of any pains. Chart review shows that patient's confusion and weakness has been present since last year but son reports that it has worsened in the last 1 week.    I have reviewed the Medications, Allergies, Past Medical and Surgical History, and Social History in the Epic system.    Review of Systems   All other systems reviewed and are negative.      Physical Exam   BP: 114/65  Pulse: 67  Temp: 96.8  F (36  C)  Resp: 16  SpO2: 92 %  Physical Exam   Constitutional: She is oriented to person, place, and time. She appears well-developed and well-nourished. No distress.   HENT:   Head: Normocephalic and atraumatic.   Mouth/Throat: Oropharynx is clear and moist. No oropharyngeal exudate.   Eyes: Pupils are equal, round, and reactive to light. No scleral icterus.   Neck: No thyromegaly present.   Cardiovascular: Normal rate, regular rhythm, normal heart sounds and intact distal pulses.    Pulmonary/Chest: Breath sounds normal. No respiratory distress. She has no wheezes. She has no rales.   Abdominal: Soft. Bowel sounds are normal. There is no tenderness.   Musculoskeletal: She exhibits no edema or tenderness.   Neurological: She is alert and oriented to person, place,  and time.   Skin: Skin is warm. No rash noted. She is not diaphoretic.   Nursing note and vitals reviewed.      ED Course   Noted elevated lactic acid and left lower lobe consolidation consistent with pneumonia.  Patient started on IV levofloxacin and IV fluids.  Admission process started.    ED Course     Procedures         Labs Ordered and Resulted from Time of ED Arrival Up to the Time of Departure from the ED   CBC WITH PLATELETS DIFFERENTIAL - Abnormal; Notable for the following:        Result Value    MCH 33.3 (*)     All other components within normal limits   COMPREHENSIVE METABOLIC PANEL - Abnormal; Notable for the following:     Sodium 131 (*)     Glucose 196 (*)     Urea Nitrogen 82 (*)     Creatinine 2.00 (*)     GFR Estimate 24 (*)     GFR Estimate If Black 28 (*)     Albumin 3.3 (*)     All other components within normal limits   LACTIC ACID - Abnormal; Notable for the following:     Lactic Acid 3.1 (*)     All other components within normal limits   ROUTINE UA WITH MICROSCOPIC - Abnormal; Notable for the following:     Nitrite Urine Positive (*)     Leukocyte Esterase Urine Small (*)     Bacteria Urine None (*)     Mucous Urine Present (*)     Hyaline Casts 5 (*)     Amorphous Crystals Moderate (*)     All other components within normal limits   D-DIMER (FV RANGE) - Abnormal; Notable for the following:     D-Dimer ng/mL 1737 (*)     All other components within normal limits   GLUCOSE BY METER - Abnormal; Notable for the following:     Glucose 170 (*)     All other components within normal limits   INR   PARTIAL THROMBOPLASTIN TIME   TSH   KETONE BETA-HYDROXYBUTYRATE QUANTITATIVE   LACTIC ACID   PULSE OXIMETRY NURSING   CARDIAC CONTINUOUS MONITORING   MEASURE URINE OUTPUT   PATIENT CARE ORDER   VITAL SIGNS   TEMPERATURE PROBE   URINE CULTURE AEROBIC BACTERIAL   BLOOD CULTURE   BLOOD CULTURE       Assessments & Plan (with Medical Decision Making)     I have reviewed the nursing notes.    I have reviewed  the findings, diagnosis, plan and need for follow up with the patient.    New Prescriptions    No medications on file       Final diagnoses:   General weakness   Pneumonia of left lower lobe due to infectious organism   D-dimer, elevated   Patient admitted with left lower lobe pneumonia and other care of primary care doctor Dr. Cagle.    6/2/2017   [facility]     Dawson Dennis MD  06/02/17 1298

## 2017-06-02 NOTE — ED NOTES
Pt presents with c/o falling at the [facility] last night about 2300. Per Pt's son she is more confused today and has been vomiting.

## 2017-06-02 NOTE — PLAN OF CARE
Face to face report given with opportunity to observe patient.    Report given to SHERYL Artis   6/2/2017  3:02 PM

## 2017-06-02 NOTE — IP AVS SNAPSHOT
[facility]    [address] [place] [id number]    Phone:  [phone]    Fax:  [phone]                                       After Visit Summary   6/2/2017    Louise Johnson    MRN: [id number]           After Visit Summary Signature Page     I have received my discharge instructions, and my questions have been answered. I have discussed any challenges I see with this plan with the nurse or doctor.    ..........................................................................................................................................  Patient/Patient Representative Signature      ..........................................................................................................................................  Patient Representative Print Name and Relationship to Patient    ..................................................               ................................................  Date                                            Time    ..........................................................................................................................................  Reviewed by Signature/Title    ...................................................              ..............................................  Date                                                            Time

## 2017-06-02 NOTE — PLAN OF CARE
"Problem: Goal Outcome Summary  Goal: Goal Outcome Summary  Outcome: No Change  [place] Observation Note:  Patient is registered to observation and is in 3212/3212-1 at approximately 1320 via cart accompanied by son from emergency room . Report received from SHERYL Rojas in SBAR format at 1238 via telephone. Patient transferred to bed via slide board. Patient is alert and oriented to person, place, time, and situation. She knew she was in the hospital but [place] the cities, she knew the month but not the day and she states she hasn't been feeling well the last couple of days. denies pain; rates at 0 on 0-10 scale.  Patient oriented to room, unit, hourly rounding, and plan of care. Explained the admission packet including \"What is Observation Status\" and patient handbook with patient bill of rights brochure. Will continue to monitor and document as needed.  Nursing Observation criteria listed below was met:    Health care directives status obtained and documented: No    Care Everywhere authorization completed No        MRSA swab completed for patient 65 years and older: Yes        If initial lactic acid >2.0, repeat lactic acid drawn within one hour of arrival to unit: Yes. If no, state reason:        Patient identifies a surrogate decision maker: Yes If yes, who:Kwabena (son) Contact Information: See face sheet    Vaccination assessment and education completed: Yes              Vaccinations received prior to hospitalization: Pneumovax yes             Influenza(seasonal)  NO              Vaccination(s) ordered: not given today because pt has gotten the pneumovax in the past and is allergic to the flu shot        Skin issues/needs documented:No    Isolation Patient: no Education given and documented, correct sign in place and documentation row added to PCS:  No        Fall Prevention: Observation fall risk completed:  Yes Education given and documented, sticker and magnet in place: Yes        General " Care Plan initiated with observation goal(s): Yes    Education (including assessment) Documented: Yes    New medication information given to patient and documented: No    Patient elects to use own medications from home during hospitalization:  No If yes, a MD order was obtained to use Medications from Home:  No and home medications were sent to Pharmacy for verification for use during hospitalization: No    Patient has discharge needs (If yes, please explain): No

## 2017-06-02 NOTE — ED NOTES
DATE:  6/2/2017   TIME OF RECEIPT FROM LAB:  1036  LAB TEST:  Lactic acid  LAB VALUE:  3.1  RESULTS GIVEN WITH READ-BACK TO (PROVIDER):  Dawson Dennis MD  TIME LAB VALUE REPORTED TO PROVIDER:   MD already aware of result prior to call

## 2017-06-02 NOTE — H&P
[facility]    History and Physical  Hospitalist       Date of Admission:  6/2/2017    Assessment & Plan   Louise Johnson is a 88 year old female who presents with     Active Problems:    Pneumonia of left lower lobe due to infectious organism    Assessment: stable but will need antibiotics and hospitalization    Plan: will admit and treat empirically and provide pulmonary toilet    General weakness    Assessment: multifactoral recent illness and arthritis and deconditioning play a large role    Plan: get PT and OT and follow     DVT Prophylaxis: Enoxaparin (Lovenox) SQ  Code Status: DNR / DNI    Disposition: Expected discharge in 3 days once improved and at baseline.    Boston Cagle    Primary Care Physician   Boston Cagle    Chief Complaint   Weakness last 2 days with fall today    History is obtained from the patient    History of Present Illness   Louise Johnson is a 88 year old female who presents with 2 day history of progressive weakness and now falls today.  Seen in ER and found to be more confused than normal.  cxr show left lower lobe infiltrate.    Past Medical History    I have reviewed this patient's medical history and updated it with pertinent information if needed.   Past Medical History:   Diagnosis Date     A-fib (H)      A-fib (H)      ASCVD (arteriosclerotic cardiovascular disease)     stent early 2000     Atrial fibrillation (H)      Atrial fibrillation (H)      Cerebral infarction (H)     multiple TIA     Depression, major      Diabetes mellitus (H)      Diverticulitis      HTN (hypertension)      Hyperlipidemia      ICB (intracranial bleed) (H)     had 2 areas of bleeding after fall      Osteoarthritis        Past Surgical History   I have reviewed this patient's surgical history and updated it with pertinent information if needed.  Past Surgical History:   Procedure Laterality Date     ANGIOPLASTY  1993' 1995, 2003     ARTHROSCOPY KNEE Left 1994     BREAST BIOPSY, RT/LT        C TOTAL KNEE ARTHROPLASTY Left 2009     CATARACT IOL, RT/LT Bilateral 2008     CHOLECYSTECTOMY       COLONOSCOPY  2003     HERNIORRHAPHY VENTRAL  9/26/2013    Procedure: HERNIORRHAPHY VENTRAL;  VENTRAL HERNIA REPAIR, POSSIBLE SMALL BOWEL RESECTION;  Surgeon: Zhang Johnston MD;  Location: [facility]     LAPAROSCOPIC APPENDECTOMY       LAPAROTOMY, LYSIS ADHESIONS, COMBINED  9/26/2013    Procedure: COMBINED LAPAROTOMY, LYSIS ADHESIONS;  Extensive Lysis of Adhesions;  Surgeon: Zhang Johnston MD;  Location: [facility]     SIGMOIDECTOMY      For diverticulitis       Prior to Admission Medications   Prior to Admission Medications   Prescriptions Last Dose Informant Patient Reported? Taking?   ASPIRIN ADULT LOW STRENGTH PO   Yes Yes   Sig: Take 81 mg by mouth daily   Acetaminophen (TYLENOL PO)   Yes Yes   Sig: Take 500 mg by mouth every 6 hours as needed for mild pain or fever   CARVEDILOL PO   Yes Yes   Sig: Take 6.25 mg by mouth 2 times daily (with meals)   GABAPENTIN PO   Yes Yes   Sig: Take 400 mg by mouth 2 times daily    Insulin Detemir (LEVEMIR SC)   Yes Yes   Sig: Inject 7 Units Subcutaneous At Bedtime    LISINOPRIL PO   Yes Yes   Sig: Take 40 mg by mouth daily   METFORMIN HCL PO   Yes Yes   Sig: Take 1,000 mg by mouth daily   MIRTAZAPINE PO   Yes Yes   Sig: Take 30 mg by mouth At Bedtime   NITROGLYCERIN SL   Yes Yes   Sig: Place 0.4 mg under the tongue every 5 minutes as needed for chest pain x3 prn   OXYBUTYNIN CHLORIDE PO   Yes Yes   Sig: Take 10 mg by mouth At Bedtime    Simvastatin (ZOCOR PO)   Yes Yes   Sig: Take 20 mg by mouth At Bedtime    TRAMADOL HCL PO Unknown at Unknown time  Yes No   Sig: Take 25 mg by mouth every 6 hours as needed for moderate to severe pain   VITAMIN D, CHOLECALCIFEROL, PO   Yes Yes   Sig: Take 1,000 Units by mouth daily   amLODIPine (NORVASC) 5 MG tablet   No Yes   Sig: Take 1 tablet (5 mg) by mouth daily   Patient taking differently: Take 7.5 mg by mouth daily     fluticasone-salmeterol (ADVAIR) 250-50 MCG/DOSE diskus inhaler   Yes Yes   Sig: Inhale 1 puff into the lungs 2 times daily   multivitamin (OCUVITE) TABS   Yes Yes   Sig: Take 1 tablet by mouth 2 times daily    oxyCODONE-acetaminophen (PERCOCET) 5-325 MG per tablet   No No   Sig: Take 1 tablet by mouth every 4 hours as needed for moderate to severe pain   polyethylene glycol (MIRALAX/GLYCOLAX) powder   Yes Yes   Sig: Take 1 capful by mouth as needed for constipation   terbinafine (LAMISIL) 250 MG tablet   Yes Yes   Sig: Take 250 mg by mouth daily      Facility-Administered Medications: None     Allergies   Allergies   Allergen Reactions     Sulfasalazine Anaphylaxis     Pt takes asa & ibuprofen at home     Cimetidine      Codeine Sulfate      Tolerated percocet, lortab, and ultram     Diflucan      Iodine I 131 Tositumomab      Morphine Sulfate      No Clinical Screening - See Comments      Flu vaccine     Tagamet      Penicillins Rash     Entire body       Social History   I have reviewed this patient's social history and updated it with pertinent information if needed. Louise TURPIN Alex  reports that she has quit smoking. She smoked 0.50 packs per day for 0.00 years. She has never used smokeless tobacco. She reports that she does not drink alcohol or use illicit drugs.    Family History   I have reviewed this patient's family history and updated it with pertinent information if needed.   Family History   Problem Relation Age of Onset     DIABETES Mother      Hypertension Mother      C.A.D. Daughter      DIABETES Brother      DIABETES Sister      DIABETES Sister      Cardiovascular Brother      AAA       Review of Systems   CONSTITUTIONAL:  negative  HEENT:  negative  RESPIRATORY:  Diminished left base  CARDIOVASCULAR:  negative  GASTROINTESTINAL:  negative  GENITOURINARY:  negative  INTEGUMENT/BREAST:  negative  MUSCULOSKELETAL:  negative    Physical Exam   Temp: 96.8  F (36  C) Temp src: Tympanic BP: (!) 142/45  Pulse: 66 Heart Rate: 67 Resp: 20 SpO2: 94 % O2 Device: Nasal cannula Oxygen Delivery: 2 LPM  Vital Signs with Ranges  Temp:  [96.8  F (36  C)] 96.8  F (36  C)  Pulse:  [66-67] 66  Heart Rate:  [61-82] 67  Resp:  [16-20] 20  BP: (107-142)/(43-65) 142/45  SpO2:  [92 %-94 %] 94 %  0 lbs 0 oz    Constitutional: awake but confused  Eyes: Lids and lashes normal, pupils equal, round and reactive to light, extra ocular muscles intact, sclera clear, conjunctiva normal  ENT: Normocephalic, without obvious abnormality, atraumatic, sinuses nontender on palpation, external ears without lesions, oral pharynx with moist mucous membranes, tonsils without erythema or exudates, gums normal and good dentition.  Hematologic / Lymphatic: no cervical lymphadenopathy  Respiratory: diminished left lower lobe  Cardiovascular: Normal apical impulse, regular rate and rhythm, normal S1 and S2, no S3 or S4, and no murmur noted  GI: No scars, normal bowel sounds, soft, non-distended, non-tender, no masses palpated, no hepatosplenomegally  Genitounirinary: normal  Skin: no bruising or bleeding  Musculoskeletal: equal and generalized weakness  Neurologic: awake but easily confused   Neuropsychiatric: General: normal, calm and normal eye contact    Data   Data reviewed today:  I personally reviewed the cxr image(s) showing left lower infiltrate.    Recent Labs  Lab 06/02/17  0957   WBC 5.3   HGB 14.4   MCV 93      INR 1.15   *   POTASSIUM 5.3   CHLORIDE 96   CO2 22   BUN 82*   CR 2.00*   ANIONGAP 13   KAILA 9.4   *   ALBUMIN 3.3*   PROTTOTAL 8.0   BILITOTAL 0.5   ALKPHOS 96   ALT 33   AST 34       Recent Results (from the past 24 hour(s))   Head CT w/o contrast    Narrative    HEAD CT    HISTORY:  Slurred speech.    COMPARISON:  Today's study is compared to a prior examination which is  dated May 14, 2017.    FINDINGS:  The ventricles and sulci are mildly prominent.  White  matter changes are similar in appearance.  Bony  structures are  unremarkable.    IMPRESSION:  1.  NO EVIDENCE OF ACUTE ABNORMALITY.    2.  MILD GENERALIZED ATROPHY AND SMALL-VESSEL DISEASE ARE SIMILAR IN  APPEARANCE COMPARED TO THE PREVIOUS STUDY.  Exam Date: Jun 02, 2017 10:12:00 AM  Author: NADINE ROCKWELL  This report is final and signed     XR Chest 2 Views    Narrative    CHEST TWO VIEWS    HISTORY:  Fever.    COMPARISON:  Today's study is compared to a prior examination which is  dated November 20, 2016.    FINDINGS:  The cardiac silhouette and pulmonary vascularity are within  normal limits.  There is slight increase in density in the left lung  that appears to be associated with the lingula, partially silhouetting  the left heart border.    IMPRESSION:  SUBTLE LINGULAR PNEUMONIA.  Exam Date: Jun 02, 2017 10:23:36 AM  Author: NADINE ROCKWELL  This report is final and signed

## 2017-06-02 NOTE — ED NOTES
Pt comes in from [facility], reported fall yesterday at facility. Reported pt has some baseline confusion, increased since fall yesterday.  Pt unsure if she had hit head or any injuries from fall.  Pt is brought in by son, via wheelchair.  Assisted pt to bathroom to void, unable to obtain urine sample.  Pt dry heaving, son reported vomiting started this am.

## 2017-06-02 NOTE — IP AVS SNAPSHOT
MRN:[id number]                      After Visit Summary   6/2/2017    Louise Johnson    MRN: [id number]           Thank you!     Thank you for choosing [facility] for your care. Our goal is always to provide you with excellent care. Hearing back from our patients is one way we can continue to improve our services. Please take a few minutes to complete the written survey that you may receive in the mail after you visit with us. Thank you!        Patient Information     Date Of Birth          4/16/1929        Designated Caregiver       Most Recent Value    Caregiver    Will someone help with your care after discharge? no      About your hospital stay     You were admitted on:  June 2, 2017 You last received care in the:  [facility]    You were discharged on:  June 5, 2017       Who to Call     For medical emergencies, please call 911.  For non-urgent questions about your medical care, please call your primary care provider or clinic, [phone]          Attending Provider     Provider Specialty    Dawson Dennis MD Emergency Medicine    Boston Cagle DO [facility]       Primary Care Provider Office Phone # Fax #    Boston Cagle [phone]      After Care Instructions     Activity - Up ad jian           Advance Diet as Tolerated       Follow this diet upon discharge: Regular            General info for SNF       Length of Stay Estimate: Short Term Care: Estimated # of Days <30  Condition at Discharge: Improving  Level of care:skilled   Rehabilitation Potential: Good  Admission H&P remains valid and up-to-date: Yes  Recent Chemotherapy: N/A  Use Nursing Home Standing Orders: Yes            Glucose monitor nursing POCT       Before meals and at bedtime            Mantoux instructions       Give two-step Mantoux (PPD) Per Facility Policy Yes                  Additional Services     Physical Therapy Adult Consult       Evaluate and treat as clinically  "indicated.    Reason:  Weakness from recent illness and deconditioning                  Pending Results     Date and Time Order Name Status Description    2017 1103 Blood culture Preliminary     2017 1103 Blood culture Preliminary             Statement of Approval     Ordered          17 0202  I have reviewed and agree with all the recommendations and orders detailed in this document.  EFFECTIVE NOW     Approved and electronically signed by:  Boston Cagle DO             Admission Information     Date & Time Provider Department Dept. Phone    2017 Boston Cagle,  [facility] [phone]      Your Vitals Were     Blood Pressure Pulse Temperature Respirations Height Weight    148/65 82 98.2  F (36.8  C) (Tympanic) 16 1.6 m (5' 3\") 86.4 kg (190 lb 7.6 oz)    Pulse Oximetry BMI (Body Mass Index)                94% 33.74 kg/m2          MyChart Information     [email or web address] lets you send messages to your doctor, view your test results, renew your prescriptions, schedule appointments and more. To sign up, go to [email or web address] . Click on \"Log in\" on the left side of the screen, which will take you to the Welcome page. Then click on \"Sign up Now\" on the right side of the page.     You will be asked to enter the access code listed below, as well as some personal information. Please follow the directions to create your username and password.     Your access code is: [email or web address]  Expires: 2017 10:28 AM     Your access code will  in 90 days. If you need help or a new code, please call your [facility] clinic or [phone].        Care EveryWhere ID     This is your Care EveryWhere ID. This could be used by other organizations to access your [facility] medical records  [id number]           Review of your medicines      START taking        Dose / Directions    levofloxacin 250 MG tablet   Commonly known as:  LEVAQUIN        Dose:  250 mg   Take 1 tablet (250 mg) by mouth " daily   Quantity:  3 tablet   Refills:  0         CONTINUE these medicines which may have CHANGED, or have new prescriptions. If we are uncertain of the size of tablets/capsules you have at home, strength may be listed as something that might have changed.        Dose / Directions    amLODIPine 5 MG tablet   Commonly known as:  NORVASC   This may have changed:  how much to take   Used for:  Essential hypertension with goal blood pressure less than 140/90        Dose:  5 mg   Take 1 tablet (5 mg) by mouth daily   Quantity:  30 tablet   Refills:  1         CONTINUE these medicines which have NOT CHANGED        Dose / Directions    ASPIRIN ADULT LOW STRENGTH PO   Indication:  ecotrim        Dose:  81 mg   Take 81 mg by mouth daily   Refills:  0       carboxymethylcellulose 0.5 % Soln ophthalmic solution   Commonly known as:  REFRESH PLUS        Dose:  1 drop   Place 1 drop into both eyes daily as needed for dry eyes   Refills:  0       CARVEDILOL PO        Dose:  6.25 mg   Take 6.25 mg by mouth 2 times daily (with meals)   Refills:  0       fluticasone-salmeterol 250-50 MCG/DOSE diskus inhaler   Commonly known as:  ADVAIR        Dose:  1 puff   Inhale 1 puff into the lungs 2 times daily   Refills:  0       GABAPENTIN PO        Dose:  400 mg   Take 400 mg by mouth 2 times daily   Refills:  0       LASIX PO        Dose:  40 mg   Take 40 mg by mouth daily   Refills:  0       LEVEMIR SC        Dose:  7 Units   Inject 7 Units Subcutaneous At Bedtime   Refills:  0       LISINOPRIL PO        Dose:  40 mg   Take 40 mg by mouth daily   Refills:  0       MIRTAZAPINE PO        Dose:  30 mg   Take 30 mg by mouth At Bedtime   Refills:  0       multivitamin Tabs tablet        Dose:  1 tablet   Take 1 tablet by mouth 2 times daily   Refills:  0       NITROGLYCERIN SL        Dose:  0.4 mg   Place 0.4 mg under the tongue every 5 minutes as needed for chest pain x3 prn   Refills:  0       NONFORMULARY   Indication:  neopoly eye drops         Dose:  1 drop   Place 1 drop into the right eye 4 times daily   Refills:  0       OXYBUTYNIN CHLORIDE PO        Dose:  10 mg   Take 10 mg by mouth At Bedtime   Refills:  0       polyethylene glycol powder   Commonly known as:  MIRALAX/GLYCOLAX        Dose:  1 capful   Take 1 capful by mouth every other day   Refills:  0       terbinafine 250 MG tablet   Commonly known as:  lamISIL        Dose:  250 mg   Take 250 mg by mouth daily   Refills:  0       * TRAMADOL HCL PO        Dose:  50 mg   Take 50 mg by mouth daily   Refills:  0       * TRAMADOL HCL PO        Dose:  50 mg   Take 50 mg by mouth every 6 hours as needed for moderate to severe pain   Refills:  0       TYLENOL PO        Dose:  500 mg   Take 500 mg by mouth every 6 hours as needed for mild pain or fever   Refills:  0       VITAMIN D (CHOLECALCIFEROL) PO        Dose:  1000 Units   Take 1,000 Units by mouth daily   Refills:  0       ZOCOR PO        Dose:  20 mg   Take 20 mg by mouth At Bedtime   Refills:  0       * Notice:  This list has 2 medication(s) that are the same as other medications prescribed for you. Read the directions carefully, and ask your doctor or other care provider to review them with you.      STOP taking     METFORMIN HCL PO                Where to get your medicines      Some of these will need a paper prescription and others can be bought over the counter. Ask your nurse if you have questions.     You don't need a prescription for these medications     levofloxacin 250 MG tablet                Protect others around you: Learn how to safely use, store and throw away your medicines at www.disposemymeds.org.             Medication List: This is a list of all your medications and when to take them. Check marks below indicate your daily home schedule. Keep this list as a reference.      Medications           Morning Afternoon Evening Bedtime As Needed    amLODIPine 5 MG tablet   Commonly known as:  NORVASC   Take 1 tablet (5 mg) by  mouth daily   Last time this was given:  7.5 mg on 6/5/2017  8:11 AM                                ASPIRIN ADULT LOW STRENGTH PO   Take 81 mg by mouth daily   Last time this was given:  81 mg on 6/5/2017  8:09 AM                                carboxymethylcellulose 0.5 % Soln ophthalmic solution   Commonly known as:  REFRESH PLUS   Place 1 drop into both eyes daily as needed for dry eyes                                CARVEDILOL PO   Take 6.25 mg by mouth 2 times daily (with meals)   Last time this was given:  6.25 mg on 6/5/2017  8:09 AM                                fluticasone-salmeterol 250-50 MCG/DOSE diskus inhaler   Commonly known as:  ADVAIR   Inhale 1 puff into the lungs 2 times daily                                GABAPENTIN PO   Take 400 mg by mouth 2 times daily   Last time this was given:  400 mg on 6/5/2017  8:10 AM                                LASIX PO   Take 40 mg by mouth daily   Last time this was given:  40 mg on 6/5/2017  8:09 AM                                LEVEMIR SC   Inject 7 Units Subcutaneous At Bedtime                                levofloxacin 250 MG tablet   Commonly known as:  LEVAQUIN   Take 1 tablet (250 mg) by mouth daily                                LISINOPRIL PO   Take 40 mg by mouth daily                                MIRTAZAPINE PO   Take 30 mg by mouth At Bedtime   Last time this was given:  30 mg on 6/4/2017  9:26 PM                                multivitamin Tabs tablet   Take 1 tablet by mouth 2 times daily   Last time this was given:  1 tablet on 6/5/2017  8:10 AM                                NITROGLYCERIN SL   Place 0.4 mg under the tongue every 5 minutes as needed for chest pain x3 prn                                NONFORMULARY   Place 1 drop into the right eye 4 times daily                                OXYBUTYNIN CHLORIDE PO   Take 10 mg by mouth At Bedtime   Last time this was given:  10 mg on 6/5/2017  8:10 AM                                polyethylene  glycol powder   Commonly known as:  MIRALAX/GLYCOLAX   Take 1 capful by mouth every other day                                terbinafine 250 MG tablet   Commonly known as:  lamISIL   Take 250 mg by mouth daily                                * TRAMADOL HCL PO   Take 50 mg by mouth daily                                * TRAMADOL HCL PO   Take 50 mg by mouth every 6 hours as needed for moderate to severe pain                                TYLENOL PO   Take 500 mg by mouth every 6 hours as needed for mild pain or fever   Last time this was given:  325 mg on 6/3/2017 12:40 PM                                VITAMIN D (CHOLECALCIFEROL) PO   Take 1,000 Units by mouth daily   Last time this was given:  1,000 Units on 6/5/2017  8:10 AM                                ZOCOR PO   Take 20 mg by mouth At Bedtime   Last time this was given:  20 mg on 6/4/2017  9:26 PM                                * Notice:  This list has 2 medication(s) that are the same as other medications prescribed for you. Read the directions carefully, and ask your doctor or other care provider to review them with you.              More Information        What is Pneumonia?  Pneumonia is a serious lung infection. Many cases of pneumonia are caused by bacteria or viruses. Other less common causes include    Fungi    Chemicals    Gases    You may also get pneumonia after another illness, such as a cold, flu, or bronchitis. Those most at risk include the elderly and people with chronic health problems.  Healthy Lungs    Air travels in and out of the lungs through tubes called airways.    The tubes branch into smaller passages called bronchioles. These end in balloonlike sacs called alveoli.    Blood vessels surrounding the alveoli take oxygen into the bloodstream. At the same time, the alveoli remove carbon dioxide (a waste gas) from the blood. The carbon dioxide is then exhaled.  When You Have Pneumonia      Pneumonia causes the bronchioles and the alveoli  to fill with excess mucus and become inflamed.    Your body s response may be to cough. This can help clear out the fluid.    The fluid (or mucus) you cough up may appear green or dark yellow.    The excess mucus may make you feel short of breath.    The inflammation and infection may give you a fever.  What are the Symptoms?  Symptoms of pneumonia can come without warning. At first, you may think you have a cold or flu. But symptoms may get worse quickly, turning into pneumonia. Symyptoms can be different for bacterial and viral pneumonia. Common symptoms may include the following:    Severe cough with green or yellow mucus that doesn't improve or gets worse    Fever and chills    Nausea, vomiting or diarrhea    Shortness of breath with normal daily activities    Increased heart rate    Chest pain or discomfort when breathing in or coughing    Headache    Excessive sweating and clammy skin    5462-9291 [employer]. [address] [place] [address]. All rights reserved. This information is not intended as a substitute for professional medical care. Always follow your healthcare professional's instructions.                Treating Pneumonia  Pneumonia is an infection of one or both of the lungs. Pneumonia:    Is usually caused by a virus    Can be very serious, especially in infants, young children, and older adults. And also in those with other long-term health problems or weakened immune systems    Is sometimes treated at home and sometimes in the hospital    Antibiotic Medications  Antibiotics may be prescribed or administered for pneumonia caused by bacteria. They may be pills or oral medications, or shots or injections. Or they may be given intravenously (IV) or into the vein. If you are taking oral medications at home:    Fill your prescription and start taking your medication as soon as you can.    You will likely start to feel better in a day or two, but don t stop taking the  antibiotic.    Use a pill organizer to help you remember to take your medication.    Let your health care provider know if you have side effects.    Take your medication exactly as directed on the label. Talk to your pharmacist if you have any questions.  Antiviral Medications  Antiviral medication may be prescribed or given for pneumonia cause by a virus. For example, antiviral medication may be prescribed for pneumonia caused by the flu virus. Antibiotics do not work against viruses. If you are taking antiviral medication at home:    Fill your prescription and start taking your medication as soon as you can.    Talk with your provider or pharmacist about possible side effects.    Take the medication exactly as instructed.  To Relieve Symptoms  There are many medications that can help relieve symptoms of pneumonia. Some are prescription and some are over-the-counter.  Your health care provider may recommend:    Acetaminophen or ibuprofen to lower your fever and to lessen headache or other pain    Cough medication to loosen mucus or to reduce coughing  Make sure you check with your health care provider or pharmacist before taking any over-the-counter medications.  Special Treatments  If you are hospitalized for pneumonia, you may have other therapies, including:    Inhaled medications to help with breathing or chest congestion    Supplemental oxygen to increase low oxygen levels  Drink Fluids and Eat Healthy  You should eat healthy to help your body fight the infection and drink a lot of fluids to replace fluids lost from fever and to help loosen mucus in the chest.    Diet. Make health food choices, including fruits and vegetables, lean meats and other proteins, 100% whole grain and low- or no-fat dairy products.    Fluids. Drinks at least 6-8 tall glasses a day. Water and 100% fruit or vegetable juice are best.  Get Plenty of Rest and Sleep  You may be more tired than usual for a while. It is important to get  enough sleep at night. And, it's important to rest during the day. Talk with your health care provider if coughing or other symptoms are interfering with your sleep.  Preventing the Spread of Germs  The best thing you can do to prevent spreading germs is to wash your hands often. You should:    Rub your hand with soap and water for 20 to 30 seconds.    Clean in between your fingers, the backs of your hands, and around your nails.    Dry your hands on a separate towel or use paper towels.  You should also:    Keep alcohol-based hand  nearby.    Make sure you also clean surfaces that you touch. Use a product that kills all types of germs.    Stay away from others until you are feeling better.  When to Call Your Health Care Provider  Call your health care provider if you have any of these:    Symptoms get worse    Fever continues    Shortness of breath gets worse    Increased mucus or mucus that is darker in color    Coughing gets worse    Lips or fingers are bluish in color    Side effects from your medication     1137-9434 [employer]. [address] [place] [address]. All rights reserved. This information is not intended as a substitute for professional medical care. Always follow your healthcare professional's instructions.                Discharge Instructions for Pneumonia  You have been diagnosed with pneumonia, a serious lung infection. Most cases of pneumonia are caused by bacteria. Pneumonia most often occurs in older adults, young children, and people with chronic health problems.  Home care    Take your medication exactly as directed. Don t skip doses. Continue taking your antibiotics as directed until they are all gone -- even if you start to feel better. This will prevent the pneumonia from coming back.    Drink at least 8 glasses of water daily, unless directed otherwise. This helps to loosen and thin secretions so that you can cough them up.    Use a cool-mist humidifier  in your bedroom. Be sure to clean the humidifier daily.    Coughing up mucus is normal. Don t use medications to suppress your cough unless your cough is dry, painful, or interferes with your sleep. You may use an expectorant if ordered by your doctor.    Warm compresses or a heating pad on the lowest setting can be used to relieve chest discomfort. Use several times a day for 15 to 20 minutes at a time. (To prevent injuring your skin, be sure the temperature of the compress or heating pad is warm, not hot.)    Get plenty of rest until your fever, shortness of breath, and chest pain go away.    Plan to get a flu shot every year.    Ask your doctor about pneumonia vaccinations.     Follow-up care  Make a follow-up appointment as directed by our staff.     When to seek medical care  Call 911 right away if you have any of the following:    Chest pain    Trouble breathing    Blue lips or fingernails  Otherwise, call your doctor if you have any of the following:    Fever above 101.5 F  (38.6 C)    Yellow, green, bloody, or smelly sputum    More than normal mucus production    Vomiting     7135-4644 [employer]. [address] [place] [address]. All rights reserved. This information is not intended as a substitute for professional medical care. Always follow your healthcare professional's instructions.                Patient Education    Levofloxacin Ophthalmic drops, solution    Levofloxacin Oral solution    Levofloxacin Oral tablet    Levofloxacin Solution for injection    Levofloxacin, Dextrose Solution for injection  Levofloxacin Oral tablet  What is this medicine?  LEVOFLOXACIN (david voe FLOX a sin) is a quinolone antibiotic. It is used to treat certain kinds of bacterial infections. It will not work for colds, flu, or other viral infections.  This medicine may be used for other purposes; ask your health care provider or pharmacist if you have questions.  What should I tell my health care  provider before I take this medicine?  They need to know if you have any of these conditions:    cerebral disease    irregular heartbeat    kidney disease    seizure disorder    an unusual or allergic reaction to levofloxacin, other antibiotics or medicines, foods, dyes, or preservatives    pregnant or trying to get pregnant    breast-feeding  How should I use this medicine?  Take this medicine by mouth with a full glass of water. Follow the directions on the prescription label. This medicine can be taken with or without food. Take your medicine at regular intervals. Do not take your medicine more often than directed. Do not skip doses or stop your medicine early even if you feel better. Do not stop taking except on your doctor's advice.  A special MedGuide will be given to you by the pharmacist with each prescription and refill. Be sure to read this information carefully each time.  Talk to your pediatrician regarding the use of this medicine in children. While this drug may be prescribed for children as young as 6 months for selected conditions, precautions do apply.  Overdosage: If you think you have taken too much of this medicine contact a poison control center or emergency room at once.  NOTE: This medicine is only for you. Do not share this medicine with others.  What if I miss a dose?  If you miss a dose, take it as soon as you remember. If it is almost time for your next dose, take only that dose. Do not take double or extra doses.  What may interact with this medicine?  Do not take this medicine with any of the following medications:    arsenic trioxide    chloroquine    droperidol    medicines for irregular heart rhythm like amiodarone, disopyramide, dofetilide, flecainide, quinidine, procainamide, sotalol    some medicines for depression or mental problems like phenothiazines, pimozide, and ziprasidone  This medicine may also interact with the following  medications:    amoxapine    antacids    cisapride    dairy products    didanosine (ddI) buffered tablets or powder    haloperidol    multivitamins    NSAIDS, medicines for pain and inflammation, like ibuprofen or naproxen    retinoid products like tretinoin or isotretinoin    risperidone    some other antibiotics like clarithromycin or erythromycin    sucralfate    theophylline    warfarin  This list may not describe all possible interactions. Give your health care provider a list of all the medicines, herbs, non-prescription drugs, or dietary supplements you use. Also tell them if you smoke, drink alcohol, or use illegal drugs. Some items may interact with your medicine.  What should I watch for while using this medicine?  Tell your doctor or health care professional if your symptoms do not improve or if they get worse. Drink several glasses of water a day and cut down on drinks that contain caffeine. You must not get dehydrated while taking this medicine.  You may get drowsy or dizzy. Do not drive, use machinery, or do anything that needs mental alertness until you know how this medicine affects you. Do not sit or stand up quickly, especially if you are an older patient. This reduces the risk of dizzy or fainting spells.  This medicine can make you more sensitive to the sun. Keep out of the sun. If you cannot avoid being in the sun, wear protective clothing and use a sunscreen. Do not use sun lamps or tanning beds/booths. Contact your doctor if you get a sunburn.  If you are a diabetic monitor your blood glucose carefully. If you get an unusual reading stop taking this medicine and call your doctor right away.  Do not treat diarrhea with over-the-counter products. Contact your doctor if you have diarrhea that lasts more than 2 days or if the diarrhea is severe and watery.  Avoid antacids, calcium, iron, and zinc products for 2 hours before and 2 hours after taking a dose of this medicine.  What side effects may I  notice from receiving this medicine?  Side effects that you should report to your doctor or health care professional as soon as possible:    allergic reactions like skin rash or hives, swelling of the face, lips, or tongue    changes in vision    confusion, nightmares or hallucinations    difficulty breathing    irregular heartbeat, chest pain    joint, muscle or tendon pain    pain or difficulty passing urine    persistent headache with or without blurred vision    redness, blistering, peeling or loosening of the skin, including inside the mouth    seizures    unusual pain, numbness, tingling, or weakness    vaginal irritation, discharge  Side effects that usually do not require medical attention (report to your doctor or health care professional if they continue or are bothersome):    diarrhea    dry mouth    headache    stomach upset, nausea    trouble sleeping  This list may not describe all possible side effects. Call your doctor for medical advice about side effects. You may report side effects to FDA at 5-348-FDA-3466.  Where should I keep my medicine?  Keep out of the reach of children.  Store at room temperature between 15 and 30 degrees C (59 and 86 degrees F). Keep in a tightly closed container. Throw away any unused medicine after the expiration date.  NOTE:This sheet is a summary. It may not cover all possible information. If you have questions about this medicine, talk to your doctor, pharmacist, or health care provider. Copyright  2016 Gold Standard                Amlodipine Besylate Oral tablet  What is this medicine?  AMLODIPINE (am YUKI di peen) is a calcium-channel blocker. It affects the amount of calcium found in your heart and muscle cells. This relaxes your blood vessels, which can reduce the amount of work the heart has to do. This medicine is used to lower high blood pressure. It is also used to prevent chest pain.  This medicine may be used for other purposes; ask your health care provider  or pharmacist if you have questions.  What should I tell my health care provider before I take this medicine?  They need to know if you have any of these conditions:    heart problems like heart failure or aortic stenosis    liver disease    an unusual or allergic reaction to amlodipine, other medicines, foods, dyes, or preservatives    pregnant or trying to get pregnant    breast-feeding  How should I use this medicine?  Take this medicine by mouth with a glass of water. Follow the directions on the prescription label. Take your medicine at regular intervals. Do not take more medicine than directed.  Talk to your pediatrician regarding the use of this medicine in children. Special care may be needed. This medicine has been used in children as young as 6.  Persons over 65 years old may have a stronger reaction to this medicine and need smaller doses.  Overdosage: If you think you have taken too much of this medicine contact a poison control center or emergency room at once.  NOTE: This medicine is only for you. Do not share this medicine with others.  What if I miss a dose?  If you miss a dose, take it as soon as you can. If it is almost time for your next dose, take only that dose. Do not take double or extra doses.  What may interact with this medicine?    herbal or dietary supplements    local or general anesthetics    medicines for high blood pressure    medicines for prostate problems    rifampin  This list may not describe all possible interactions. Give your health care provider a list of all the medicines, herbs, non-prescription drugs, or dietary supplements you use. Also tell them if you smoke, drink alcohol, or use illegal drugs. Some items may interact with your medicine.  What should I watch for while using this medicine?  Visit your doctor or health care professional for regular check ups. Check your blood pressure and pulse rate regularly. Ask your health care professional what your blood pressure and  pulse rate should be, and when you should contact him or her.  This medicine may make you feel confused, dizzy or lightheaded. Do not drive, use machinery, or do anything that needs mental alertness until you know how this medicine affects you. To reduce the risk of dizzy or fainting spells, do not sit or stand up quickly, especially if you are an older patient. Avoid alcoholic drinks; they can make you more dizzy.  Do not suddenly stop taking amlodipine. Ask your doctor or health care professional how you can gradually reduce the dose.  What side effects may I notice from receiving this medicine?  Side effects that you should report to your doctor or health care professional as soon as possible:    allergic reactions like skin rash, itching or hives, swelling of the face, lips, or tongue    breathing problems    changes in vision or hearing    chest pain    fast, irregular heartbeat    swelling of legs or ankles  Side effects that usually do not require medical attention (report to your doctor or health care professional if they continue or are bothersome):    dry mouth    facial flushing    nausea, vomiting    stomach gas, pain    tired, weak    trouble sleeping  This list may not describe all possible side effects. Call your doctor for medical advice about side effects. You may report side effects to FDA at 2-624-FDA-7394.  Where should I keep my medicine?  Keep out of the reach of children.  Store at room temperature between 59 and 86 degrees F (15 and 30 degrees C). Protect from light. Keep container tightly closed. Throw away any unused medicine after the expiration date.  NOTE:This sheet is a summary. It may not cover all possible information. If you have questions about this medicine, talk to your doctor, pharmacist, or health care provider. Copyright  2016 Gold Standard

## 2017-06-03 NOTE — PROVIDER NOTIFICATION
Dr. Young notified of pt and pt's family requesting PTA eye drops for dry eyes. Telephone orders with read back for refresh plus 0.5% 1 drop to both eyes Q2H PRN.

## 2017-06-03 NOTE — PLAN OF CARE
Problem: Patient Goal: Social Work Focus  Goal: 1. Patient Goal: Social Work Focus  Assessment completed via flow sheet     JUSTIN reviewed, level: Average     Louise is sitting up in bed. Her son Kwabena, daughter Monique and son in law Edilson are present during the assessment. Her PCP is Dr Cagle, she does not have a dentist but is confident that if she needs one she can find one and she goes to the [facility]. Her daughter Cady is her POA and she has a healthcare directive on file. She uses [facility]. She is not a .     Louise lives at [facility] in [place], in a 2 bedroom apartment, she states she feels safe there. She is bathed 3X weekly, has 3 meals daily and is checked on every 2 hrs, 24 hrs a day. She does not use the stove, only the micro-wave.she uses a rollator walker for ambulation. She also has a standart walker, commode, sliding shower chair, wheelchair and multiple canes. She fell on 06-01-17. Kwabena does her shopping and provides her transportation.     She sleeps well at night and during the day. She has no mood concerns. She quit smoking in 1993 and very seldomly consumes alcohol. Her [employer] leda is important to her, Fr Danial has been notified of this hospitalization. Her support system are her 3 children and their spouses. She denies stressors. She enjoys going for car rides.      Family would like to wait till Monday to speak with Dr Cagle about discharge plan, back to [facility] or SNF. This writer okayed with the family to get the referrals out to the SNF's as they are very full, family agreed. Will remain available for discharge planning.

## 2017-06-03 NOTE — PHARMACY
[facility]    Pharmacy      Antimicrobial Stewardship Note     Current antimicrobial therapy:  Anti-infectives (Future)    Start     Dose/Rate Route Frequency Ordered Stop    06/04/17 1230  levofloxacin (LEVAQUIN) infusion 250 mg     Comments:  DO NOT USE THIS FIELD FOR ADMIN INSTRUCTIONS; INFORMATION DOES NOT SHOW ON MAR. USE THE FIELD ABOVE MARKED ADMIN INSTRUCTIONS    250 mg  50 mL/hr over 60 Minutes Intravenous EVERY 48 HOURS 06/03/17 0932            Indication: community acquired pneumonia     Pertinent labs:  Creatinine   Creatinine   Date Value Ref Range Status   06/03/2017 1.61 (H) 0.52 - 1.04 mg/dL Final   06/02/2017 2.00 (H) 0.52 - 1.04 mg/dL Final   11/20/2016 0.97 0.52 - 1.04 mg/dL Final     WBC   WBC   Date Value Ref Range Status   06/03/2017 3.8 (L) 4.0 - 11.0 10e9/L Final   06/02/2017 5.3 4.0 - 11.0 10e9/L Final   05/14/2017 3.3 (L) 4.0 - 11.0 10e9/L Final     ANC No components found for: ANC     Culture results:     Culture Results:      Recommendations/Interventions:  1.Continue current regimen      Cassandra Maria RPH  Emily 3, 2017    Culture results:           No further recommendations  June 4, 2017  Cassandra Maria

## 2017-06-03 NOTE — PROGRESS NOTES
GAIT/AMBULATION: Pt ambul x 20 ft x 2 with Wh walker req min asst one. Pt fatigued following gait and p02 sats consistently over 90% on room air.  EQUIPMENT NEEDS AT DISCHARGE: *TBD  D/C RECOMMENDATIONS: Possible SNF for short term rehab vs LTC facility  TREATMENT AT D/C: TBD

## 2017-06-03 NOTE — PROGRESS NOTES
Per discharge planning, the choice of vendor list has been provided to the patient/family for a skilled nursing facility.    First Choice : Skilled Nursing Facility [facility]: [facility]     [phone]    Second Choice: Skilled Nursing Facility [facility]    [phone]    Third Choice: Skilled Nursing Facility [facility]: [facility]

## 2017-06-03 NOTE — PLAN OF CARE
Problem: Goal Outcome Summary  Goal: Goal Outcome Summary  Outcome: Improving  Pt presented with VSS, denying pain. Pt has a poor appetite, eating only 25% of dinner. Lungs clear but dim in LLL. Pt is confused to place, time, and situation. IV infusing NS at 50ml/hr without complication. Pt resting in bed - alarms in place -  Will continue to monitor.      Temp: 97.9  F (36.6  C) Temp src: Tympanic BP: (!) 107/49 Pulse: 86 Heart Rate: 74 Resp: 22 SpO2: 93 % O2 Device: None (Room air)

## 2017-06-03 NOTE — PLAN OF CARE
Problem: Goal Outcome Summary  Goal: Goal Outcome Summary  Outcome: No Change  Patient has been oriented to person only during shift. She calls out appropriately to use the commode with an assist of 1. She has had minimal oral intake during shift and has been sleeping off and on throughout. She has denied pain. LS clear, BS active, HR regular. VSS with last set of vitals as follows Temp: 97  F (36.1  C) Temp src: Tympanic BP: 118/51 Pulse: 71 Heart Rate: 83 Resp: 18 SpO2: 94 % O2 Device: None (Room air) Oxygen Delivery: 2 LPM  She has taken all oral medications without difficulty. IVF continues per MAR. Bed alarms have been activated and audible during shift. She has remained free from all falls and/or injuries. Will continue to monitor.     Problem: Pneumonia (Adult)  Goal: Signs and Symptoms of Listed Potential Problems Will be Absent or Manageable (Pneumonia)  Signs and symptoms of listed potential problems will be absent or manageable by discharge/transition of care (reference Pneumonia (Adult) CPG).     06/02/17 4121   Pneumonia   Problems Present (Pneumonia) progression of infection

## 2017-06-03 NOTE — PLAN OF CARE
Problem: Goal Outcome Summary  Goal: Goal Outcome Summary  Outcome: No Change  Patient alert and oriented to self/family. Intermittently knows where she is. Family here majority of the day. VSS. Started on sliding scale insulin before meals and at HS. Patient still on regular diet Patient ate approximately 25 % of breakfast and lunch. IVF decreased to 50 ml/hr. Lungs clear, LLL diminished. Bowel sounds active x4. OT reported patient had a BM today. Frequent urination. Ax1 with gait belt to commode. Patient pleasant and re-directs easily, a couple of attempt to get OOB with urgency to void. Patient c/o a headache and received PRN Tylenol per MAR with relief. Patient on RA, VSS. Call light within reach, bed alarms activated and audible.      Face to face report given with opportunity to observe patient.     Report given to Antionette Ly   6/3/2017  3:25 PM

## 2017-06-03 NOTE — PLAN OF CARE
"Per patients son \" pt does not eat much at all normally \". He also requests ambesol for the patient to use prior to eating meals. MD has approved the use of this medication at this time.   "

## 2017-06-03 NOTE — PROGRESS NOTES
[facility]  Progress Note            Subjective:   Pleasant confusion. But denies sob, cough, pleur pain and chest pain. Appetite poor                 Medications:     Current Facility-Administered Medications Ordered in Epic   Medication Dose Route Frequency Last Rate Last Dose     benzocaine (ORAJEL/ANBESOL) 10 % gel   Mouth/Throat Q3H PRN         0.9% sodium chloride infusion   Intravenous Continuous 100 mL/hr at 06/03/17 0645       levofloxacin (LEVAQUIN) infusion 500 mg  500 mg Intravenous Q24H         naloxone (NARCAN) injection 0.1-0.4 mg  0.1-0.4 mg Intravenous Q2 Min PRN         enoxaparin (LOVENOX) injection 30 mg  30 mg Subcutaneous Q24H   30 mg at 06/02/17 1635     acetaminophen (TYLENOL) tablet 325 mg  325 mg Oral Q6H PRN         amLODIPine (NORVASC) tablet 7.5 mg  7.5 mg Oral Daily         aspirin EC EC tablet 81 mg  81 mg Oral Daily         hypromellose-dextran (ARTIFICAL TEARS) ophthalmic solution 1 drop  1 drop Both Eyes Daily PRN         carvedilol (COREG) tablet 6.25 mg  6.25 mg Oral BID w/meals   6.25 mg at 06/02/17 1957     fluticasone-vilanterol (BREO ELLIPTA) 100-25 MCG/INH oral inhaler 1 puff  1 puff Inhalation Daily         furosemide (LASIX) tablet 40 mg  40 mg Oral Daily         gabapentin (NEURONTIN) capsule 400 mg  400 mg Oral BID   400 mg at 06/02/17 2142     insulin detemir (LEVEMIR) injection 7 Units  7 Units Subcutaneous At Bedtime   7 Units at 06/02/17 2147     mirtazapine (REMERON) tablet 30 mg  30 mg Oral At Bedtime   30 mg at 06/02/17 2142     multivitamin (I-RADHA) per tablet 1 tablet  1 tablet Oral BID   1 tablet at 06/02/17 2022     nitroglycerin (NITROSTAT) sublingual tablet 0.4 mg  0.4 mg Sublingual Q5 Min PRN         oxybutynin (DITROPAN) tablet 10 mg  10 mg Oral At Bedtime   10 mg at 06/02/17 2143     polyethylene glycol (MIRALAX/GLYCOLAX) Packet 17 g  17 g Oral Every Other Day         simvastatin (ZOCOR) tablet 20 mg  20 mg Oral At Bedtime   20 mg at 06/02/17  "2142     traMADol (ULTRAM) tablet 50 mg  50 mg Oral Q6H PRN         cholecalciferol (vitamin D) tablet 1,000 Units  1,000 Units Oral Daily         glucose 40 % gel 15-30 g  15-30 g Oral Q15 Min PRN        Or     dextrose 50 % injection 25-50 mL  25-50 mL Intravenous Q15 Min PRN        Or     glucagon injection 1 mg  1 mg Subcutaneous Q15 Min PRN         No current [facility]-ordered outpatient prescriptions on file.       Review of Systems:   C: NEGATIVE for fever, chills, change in weight  E/M: NEGATIVE for ear, mouth and throat problems  R: NEGATIVE for significant cough or SOB  CV: NEGATIVE for chest pain, palpitations or peripheral edema               Physical Exam:   Vitals were reviewed  Patient Vitals for the past 24 hrs:   BP Temp Temp src Pulse Heart Rate Resp SpO2 Height Weight   06/02/17 2327 127/53 97.7  F (36.5  C) Tympanic 86 - 18 92 % - -   06/02/17 2144 - 98.1  F (36.7  C) Tympanic - - - - - -   06/02/17 1956 118/51 - - - 83 - - - -   06/02/17 1621 (!) 104/44 97  F (36.1  C) Tympanic - 73 18 94 % - -   06/02/17 1325 (!) 116/43 96.9  F (36.1  C) Tympanic 71 - 18 94 % 5' 3\" (1.6 m) 190 lb 7.6 oz (86.4 kg)   06/02/17 1249 (!) 142/45 - - 66 - 20 94 % - -   06/02/17 1238 142/54 - - - 67 - 94 % - -   06/02/17 1232 - - - - - - 94 % - -   06/02/17 1230 109/65 - - - 82 - - - -   06/02/17 1215 (!) 107/43 - - - 78 - 94 % - -   06/02/17 1200 113/55 - - - 61 - - - -   06/02/17 1159 - - - - - - 92 % - -   06/02/17 1145 127/60 - - - 62 - - - -   06/02/17 1144 - - - - - - 93 % - -   06/02/17 1131 - - - - - - 93 % - -   06/02/17 1130 124/59 - - - 62 - - - -   06/02/17 1115 131/64 - - - 70 - - - -   06/02/17 1114 - - - - - - 92 % - -   06/02/17 1100 126/59 - - - 69 - - - -   06/02/17 1059 - - - - - - 93 % - -   06/02/17 1045 117/58 - - - 67 - 92 % - -   06/02/17 1030 - - - - - - 93 % - -   06/02/17 1029 113/58 - - - 67 - - - -   06/02/17 0911 114/65 96.8  F (36  C) Tympanic 67 - 16 92 % - -     Constitutional: Awake, alert, " cooperative.  Eyes:  sclera clear  ENT: no oral lesions  Lungs: rare scattered rhonchi  Cardiovascular: distant RRR  Abdomen: normal bowel sounds, soft, non-distended, non-tender, no masses palpated,   Neurologic: Awake, alert, oriented to name  Neuropsychiatric: Normal affect, mood, orientation, memory and insight.  Ext-no edema    Peripheral IV 06/02/17 Right Lower forearm (Active)   Site Assessment WDL 6/2/2017 11:28 PM   Line Status Infusing 6/2/2017 11:28 PM   Phlebitis Scale 0-->no symptoms 6/2/2017 11:28 PM   Infiltration Scale 0 6/2/2017  1:30 PM   Extravasation? No 6/2/2017 11:28 PM   Dressing Intervention Dressing reinforced 6/2/2017 11:28 PM   Number of days:1       Rash 09/28/13 0211 Bilateral midline groin (Active)   Number of days:1344       Incision/Surgical Site 09/26/13 Midline Abdomen (Active)   Number of days:1346     Line/device assessment(s) completed for medical necessity         Data:     Results for orders placed or performed during the hospital encounter of 06/02/17 (from the past 24 hour(s))   Glucose by meter   Result Value Ref Range    Glucose 170 (H) 70 - 99 mg/dL   CBC with platelets differential   Result Value Ref Range    WBC 5.3 4.0 - 11.0 10e9/L    RBC Count 4.33 3.8 - 5.2 10e12/L    Hemoglobin 14.4 11.7 - 15.7 g/dL    Hematocrit 40.2 35.0 - 47.0 %    MCV 93 78 - 100 fl    MCH 33.3 (H) 26.5 - 33.0 pg    MCHC 35.8 31.5 - 36.5 g/dL    RDW 12.9 10.0 - 15.0 %    Platelet Count 202 150 - 450 10e9/L    Diff Method Automated Method     % Neutrophils 50.0 %    % Lymphocytes 39.0 %    % Monocytes 8.9 %    % Eosinophils 1.3 %    % Basophils 0.4 %    % Immature Granulocytes 0.4 %    Nucleated RBCs 0 0 /100    Absolute Neutrophil 2.6 1.6 - 8.3 10e9/L    Absolute Lymphocytes 2.1 0.8 - 5.3 10e9/L    Absolute Monocytes 0.5 0.0 - 1.3 10e9/L    Absolute Eosinophils 0.1 0.0 - 0.7 10e9/L    Absolute Basophils 0.0 0.0 - 0.2 10e9/L    Abs Immature Granulocytes 0.0 0 - 0.4 10e9/L    Absolute Nucleated  RBC 0.0    Comprehensive metabolic panel   Result Value Ref Range    Sodium 131 (L) 133 - 144 mmol/L    Potassium 5.3 3.4 - 5.3 mmol/L    Chloride 96 94 - 109 mmol/L    Carbon Dioxide 22 20 - 32 mmol/L    Anion Gap 13 3 - 14 mmol/L    Glucose 196 (H) 70 - 99 mg/dL    Urea Nitrogen 82 (H) 7 - 30 mg/dL    Creatinine 2.00 (H) 0.52 - 1.04 mg/dL    GFR Estimate 24 (L) >60 mL/min/1.7m2    GFR Estimate If Black 28 (L) >60 mL/min/1.7m2    Calcium 9.4 8.5 - 10.1 mg/dL    Bilirubin Total 0.5 0.2 - 1.3 mg/dL    Albumin 3.3 (L) 3.4 - 5.0 g/dL    Protein Total 8.0 6.8 - 8.8 g/dL    Alkaline Phosphatase 96 40 - 150 U/L    ALT 33 0 - 50 U/L    AST 34 0 - 45 U/L   Lactic acid   Result Value Ref Range    Lactic Acid 3.1 (H) 0.4 - 2.0 mmol/L   D-Dimer (FV Range)   Result Value Ref Range    D-Dimer ng/mL 1737 (H) 0 - 300 ng/ml D-DU   INR   Result Value Ref Range    INR 1.15 0.80 - 1.20   Partial thromboplastin time   Result Value Ref Range    PTT 26 24.00 - 37.00 sec   TSH   Result Value Ref Range    TSH 1.65 0.40 - 4.00 mU/L   Head CT w/o contrast    Narrative    HEAD CT    HISTORY:  Slurred speech.    COMPARISON:  Today's study is compared to a prior examination which is  dated May 14, 2017.    FINDINGS:  The ventricles and sulci are mildly prominent.  White  matter changes are similar in appearance.  Bony structures are  unremarkable.    IMPRESSION:  1.  NO EVIDENCE OF ACUTE ABNORMALITY.    2.  MILD GENERALIZED ATROPHY AND SMALL-VESSEL DISEASE ARE SIMILAR IN  APPEARANCE COMPARED TO THE PREVIOUS STUDY.  Exam Date: Jun 02, 2017 10:12:00 AM  Author: NADINE ROCKWELL  This report is final and signed     XR Chest 2 Views    Narrative    CHEST TWO VIEWS    HISTORY:  Fever.    COMPARISON:  Today's study is compared to a prior examination which is  dated November 20, 2016.    FINDINGS:  The cardiac silhouette and pulmonary vascularity are within  normal limits.  There is slight increase in density in the left lung  that appears to be  associated with the lingula, partially silhouetting  the left heart border.    IMPRESSION:  SUBTLE LINGULAR PNEUMONIA.  Exam Date: Jun 02, 2017 10:23:36 AM  Author: NADINE ROCKWELL  This report is final and signed     UA with Microscopic   Result Value Ref Range    Color Urine Yellow     Appearance Urine Clear     Glucose Urine Negative NEG mg/dL    Bilirubin Urine Negative NEG    Ketones Urine Negative NEG mg/dL    Specific Gravity Urine 1.006 1.003 - 1.035    Blood Urine Negative NEG    pH Urine 5.0 4.7 - 8.0 pH    Protein Albumin Urine Negative NEG mg/dL    Urobilinogen mg/dL Normal 0.0 - 2.0 mg/dL    Nitrite Urine Positive (A) NEG    Leukocyte Esterase Urine Small (A) NEG    Source Catheterized Urine     WBC Urine 2 0 - 2 /HPF    RBC Urine <1 0 - 2 /HPF    Bacteria Urine None (A) NEG /HPF    Mucous Urine Present (A) NEG /LPF    Hyaline Casts 5 (A) 0 - 2 /LPF    Amorphous Crystals Moderate (A) NEG /HPF   Urine Culture Aerobic Bacterial   Result Value Ref Range    Specimen Description Catheterized Urine     Culture Micro (A)      >100,000 colonies/mL Gram negative rods Identification and susceptibility to   follow.      Micro Report Status Pending    Blood culture   Result Value Ref Range    Specimen Description Blood Left Hand     Special Requests PEDS BOTTLE ONLY     Culture Micro No growth after 20 hours     Micro Report Status Pending    Blood culture   Result Value Ref Range    Specimen Description Blood Arm     Special Requests AEROBIC 6ML ,ANAEROBIC 2 M.     Culture Micro No growth after 20 hours     Micro Report Status Pending    Ketone Beta-Hydroxybutyrate Quantitative   Result Value Ref Range    Ketone Quantitative 0.3 0.0 - 0.6 mmol/L   Lactic acid   Result Value Ref Range    Lactic Acid 2.4 (H) 0.4 - 2.0 mmol/L   Active MRSA Surveillance Culture   Result Value Ref Range    Specimen Description Nares     Culture Micro Culture in progress     Micro Report Status Pending    Glucose by meter   Result Value  Ref Range    Glucose 132 (H) 70 - 99 mg/dL   Glucose by meter   Result Value Ref Range    Glucose 127 (H) 70 - 99 mg/dL   Lactic acid   Result Value Ref Range    Lactic Acid 2.3 (H) 0.4 - 2.0 mmol/L   Basic metabolic panel   Result Value Ref Range    Sodium 136 133 - 144 mmol/L    Potassium 4.6 3.4 - 5.3 mmol/L    Chloride 102 94 - 109 mmol/L    Carbon Dioxide 25 20 - 32 mmol/L    Anion Gap 9 3 - 14 mmol/L    Glucose 118 (H) 70 - 99 mg/dL    Urea Nitrogen 69 (H) 7 - 30 mg/dL    Creatinine 1.61 (H) 0.52 - 1.04 mg/dL    GFR Estimate 30 (L) >60 mL/min/1.7m2    GFR Estimate If Black 37 (L) >60 mL/min/1.7m2    Calcium 8.3 (L) 8.5 - 10.1 mg/dL   CBC with platelets differential   Result Value Ref Range    WBC 3.8 (L) 4.0 - 11.0 10e9/L    RBC Count 4.04 3.8 - 5.2 10e12/L    Hemoglobin 13.1 11.7 - 15.7 g/dL    Hematocrit 37.9 35.0 - 47.0 %    MCV 94 78 - 100 fl    MCH 32.4 26.5 - 33.0 pg    MCHC 34.6 31.5 - 36.5 g/dL    RDW 12.8 10.0 - 15.0 %    Platelet Count 166 150 - 450 10e9/L    Diff Method Automated Method     % Neutrophils 39.2 %    % Lymphocytes 45.5 %    % Monocytes 12.4 %    % Eosinophils 2.1 %    % Basophils 0.5 %    % Immature Granulocytes 0.3 %    Nucleated RBCs 0 0 /100    Absolute Neutrophil 1.5 (L) 1.6 - 8.3 10e9/L    Absolute Lymphocytes 1.7 0.8 - 5.3 10e9/L    Absolute Monocytes 0.5 0.0 - 1.3 10e9/L    Absolute Eosinophils 0.1 0.0 - 0.7 10e9/L    Absolute Basophils 0.0 0.0 - 0.2 10e9/L    Abs Immature Granulocytes 0.0 0 - 0.4 10e9/L    Absolute Nucleated RBC 0.0      All cardiac studies reviewed by me.  All imaging studies reviewed by me.         Assessment and Plan:   Principal Problem:    Pneumonia of left lower lobe due to infectious organism   subtle infiltrate of the lingula  Clinically improving. Continue levaquin. No signs/symptoms of sepsis  Blood cx thus far neg    Active Problems:    Diabetes mellitus (H)   metformin on hold due to renal insuff. Will add sliding scale for coverage       Hypertension    bp stable in spite of holding lisinopril      General weakness  PT eval    UTI  On levaquin for pneumonia and this. Cont pending cx results    Renal insuff  Cr improving. Cont to hold lisinopril and metformin

## 2017-06-03 NOTE — PLAN OF CARE
Face to face report given with opportunity to observe patient.    Report given to Amaya Smiley RN  6/3/2017  7:21 AM

## 2017-06-03 NOTE — PLAN OF CARE
Problem: Goal Outcome Summary  Goal: Goal Outcome Summary  Outcome: No Change  Pt is oriented to person only. She continues to use the bedside commode frequently with assistance of 1 person and the gaitbelt. Bed alarms have been active and audible at all times during shift. IVF continues per MAR. VSS, LS clear and HR regular. She has had minimal intake during shift No c/o n&v and no episodes of emesis during shift. She has used the call light at times to make her needs known. She has remained free from all falls and/or injuries during shift.     Problem: Pneumonia (Adult)  Goal: Signs and Symptoms of Listed Potential Problems Will be Absent or Manageable (Pneumonia)  Signs and symptoms of listed potential problems will be absent or manageable by discharge/transition of care (reference Pneumonia (Adult) CPG).   Outcome: No Change    06/02/17 2329   Pneumonia   Problems Assessed (Pneumonia) all   Problems Present (Pneumonia) progression of infection

## 2017-06-03 NOTE — PROGRESS NOTES
06/03/17 1000   Quick Adds   Type of Visit Initial PT Evaluation   Living Environment   Lives With alone   Living Arrangements assisted living   Home Accessibility no concerns   Number of Stairs to Enter Home 0   Number of Stairs Within Home 0   Stair Railings at Home inside, present on right side   Transportation Available family or friend will provide   Living Environment Comment Pt living at [facility] in [place]   Self-Care   Dominant Hand right   Usual Activity Tolerance moderate   Current Activity Tolerance fair   Regular Exercise no   Equipment Currently Used at Home walker, rolling   Activity/Exercise/Self-Care Comment per chart, pt unable to consistently give accurate hx   Functional Level Prior   Ambulation 0-->independent   Transferring 0-->independent   Toileting 0-->independent   Bathing 0-->independent   Dressing 0-->independent   Eating 0-->independent   Communication 0-->understands/communicates without difficulty   Swallowing 0-->swallows foods/liquids without difficulty   Cognition 1 - attention or memory deficits   Fall history within last six months yes   Number of times patient has fallen within last six months 1   Which of the above functional risks had a recent onset or change? fall history   Prior Functional Level Comment Son reports pt falling frequently at [facility]. Reports she had to crawl out of her apartment to get help after this recent fall. Reports he thinks she needs more assistance   General Information   Onset of Illness/Injury or Date of Surgery - Date 06/02/17   Referring Physician Dr An Doran   Patient/Family Goals Statement get better   Pertinent History of Current Problem (include personal factors and/or comorbidities that impact the POC) This 87 y/o female was hosp yesterday with fall at [facility] and diagnosed with pneumonia.    Precautions/Limitations fall precautions   General Observations confused and is aware of confusion  "  Cognitive Status Examination   Orientation person   Level of Consciousness alert;confused   Follows Commands and Answers Questions 100% of the time   Personal Safety and Judgment impaired   Memory impaired   Pain Assessment   Patient Currently in Pain No   Strength   Strength Comments R hip 3+/5, L hip 3/5, R knee 4/5, L knee 3/5, R/L ankles 3+/5   Bed Mobility   Bed Mobility Comments Indep in rolling. Sit/supine req min asst one   Transfer Skills   Transfer Comments Sit to stand req min asst one. Pivot transfer req min asst one. Toileted req min asst one with pt able to wash hands, toilet cares indep.   Gait   Gait Comments Pt ambul with wh walker x 20 ft x 2 req min asst one with p02 sats over 90% consistently, on room air.   Balance   Balance Comments D/t previous falls, pt is fall risk. Req min asst and belt at all times.    General Therapy Interventions   Planned Therapy Interventions strengthening;gait training   Intervention Comments education   Clinical Impression   Criteria for Skilled Therapeutic Intervention yes, treatment indicated   PT Diagnosis SIgnif weakness, decrd balance, and debility d/t pneumonia   Influenced by the following impairments weakness, debility, confusion, fall   Functional limitations due to impairments ambulation, adls, transfers   Clinical Presentation Evolving/Changing   Clinical Decision Making (Complexity) Low complexity   Therapy Frequency` daily   Predicted Duration of Therapy Intervention (days/wks) 7 days   Anticipated Equipment Needs at Discharge (to be determined)   Anticipated Discharge Disposition Other (see comments)  [facility])   Risk & Benefits of therapy have been explained Yes   Patient, Family & other staff in agreement with plan of care Yes   Clinical Impression Comments Pt will benefit from daily PT while inpatient to increase strength, transfers, ambulation and decr fall risk   [facility] AM-PAC TM \"6 Clicks\"   " "2016, Trustees of [facility], under license to [employer].  All rights reserved.   6 Clicks Short Forms Basic Mobility Inpatient Short Form   [facility] AM-PAC  \"6 Clicks\" V.2 Basic Mobility Inpatient Short Form   1. Turning from your back to your side while in a flat bed without using bedrails? 3 - A Little   2. Moving from lying on your back to sitting on the side of a flat bed without using bedrails? 3 - A Little   3. Moving to and from a bed to a chair (including a wheelchair)? 3 - A Little   4. Standing up from a chair using your arms (e.g., wheelchair, or bedside chair)? 3 - A Little   5. To walk in hospital room? 3 - A Little   6. Climbing 3-5 steps with a railing? 1 - Total   Basic Mobility Raw Score (Score out of 24.Lower scores equate to lower levels of function) 16   Total Evaluation Time   Total Evaluation Time (Minutes) 20      06/03/17 1000   Quick Adds   Type of Visit Initial PT Evaluation   Living Environment   Lives With alone   Living Arrangements assisted living   Home Accessibility no concerns   Number of Stairs to Enter Home 0   Number of Stairs Within Home 0   Stair Railings at Home inside, present on right side   Transportation Available family or friend will provide   Living Environment Comment Pt living at [facility] in [place]   Self-Care   Dominant Hand right   Usual Activity Tolerance moderate   Current Activity Tolerance fair   Regular Exercise no   Equipment Currently Used at Home walker, rolling   Activity/Exercise/Self-Care Comment per chart, pt unable to consistently give accurate hx   Functional Level Prior   Ambulation 0-->independent   Transferring 0-->independent   Toileting 0-->independent   Bathing 0-->independent   Dressing 0-->independent   Eating 0-->independent   Communication 0-->understands/communicates without difficulty   Swallowing 0-->swallows foods/liquids without difficulty   Cognition 1 - attention or memory deficits   Fall " history within last six months yes   Number of times patient has fallen within last six months 1   Which of the above functional risks had a recent onset or change? fall history   Prior Functional Level Comment Son reports pt falling frequently at [facility]. Reports she had to crawl out of her apartment to get help after this recent fall. Reports he thinks she needs more assistance   General Information   Onset of Illness/Injury or Date of Surgery - Date 06/02/17   Referring Physician Dr An Doran   Patient/Family Goals Statement get better   Pertinent History of Current Problem (include personal factors and/or comorbidities that impact the POC) This 89 y/o female was hosp yesterday with fall at [facility] and diagnosed with pneumonia.    Precautions/Limitations fall precautions   General Observations confused and is aware of confusion   Cognitive Status Examination   Orientation person   Level of Consciousness alert;confused   Follows Commands and Answers Questions 100% of the time   Personal Safety and Judgment impaired   Memory impaired   Pain Assessment   Patient Currently in Pain No   Strength   Strength Comments R hip 3+/5, L hip 3/5, R knee 4/5, L knee 3/5, R/L ankles 3+/5   Bed Mobility   Bed Mobility Comments Indep in rolling. Sit/supine req min asst one   Transfer Skills   Transfer Comments Sit to stand req min asst one. Pivot transfer req min asst one. Toileted req min asst one with pt able to wash hands, toilet cares indep.   Gait   Gait Comments Pt ambul with wh walker x 20 ft x 2 req min asst one with p02 sats over 90% consistently, on room air.   Balance   Balance Comments D/t previous falls, pt is fall risk. Req min asst and belt at all times.    General Therapy Interventions   Planned Therapy Interventions strengthening;gait training   Intervention Comments education   Clinical Impression   Criteria for Skilled Therapeutic Intervention yes, treatment indicated   PT Diagnosis SIgnif  "weakness, decrd balance, and debility d/t pneumonia   Influenced by the following impairments weakness, debility, confusion, fall   Functional limitations due to impairments ambulation, adls, transfers   Clinical Presentation Evolving/Changing   Clinical Decision Making (Complexity) Low complexity   Therapy Frequency` daily   Predicted Duration of Therapy Intervention (days/wks) 7 days   Anticipated Equipment Needs at Discharge (to be determined)   Anticipated Discharge Disposition Other (see comments)  (probable return to asst living facility or SNF)   Risk & Benefits of therapy have been explained Yes   Patient, Family & other staff in agreement with plan of care Yes   Clinical Impression Comments Pt will benefit from daily PT while inpatient to increase strength, transfers, ambulation and decr fall risk   [facility] AM-PAC TM \"6 Clicks\"   2016, Trustees of [facility], under license to [employer].  All rights reserved.   6 Clicks Short Forms Basic Mobility Inpatient Short Form   [facility] AM-PAC  \"6 Clicks\" V.2 Basic Mobility Inpatient Short Form   1. Turning from your back to your side while in a flat bed without using bedrails? 3 - A Little   2. Moving from lying on your back to sitting on the side of a flat bed without using bedrails? 3 - A Little   3. Moving to and from a bed to a chair (including a wheelchair)? 3 - A Little   4. Standing up from a chair using your arms (e.g., wheelchair, or bedside chair)? 3 - A Little   5. To walk in hospital room? 3 - A Little   6. Climbing 3-5 steps with a railing? 1 - Total   Basic Mobility Raw Score (Score out of 24.Lower scores equate to lower levels of function) 16   Total Evaluation Time   Total Evaluation Time (Minutes) 20     "

## 2017-06-04 PROBLEM — N28.9 RENAL INSUFFICIENCY: Status: ACTIVE | Noted: 2017-01-01

## 2017-06-04 PROBLEM — N39.0 ACUTE LOWER URINARY TRACT INFECTION: Status: ACTIVE | Noted: 2017-01-01

## 2017-06-04 NOTE — PROVIDER NOTIFICATION
Notified Dr. Young of pt's fall. No new orders at this time, will page Dr. Young with any changes or complaints of pain. Will continue to monitor.

## 2017-06-04 NOTE — PLAN OF CARE
Face to face report given with opportunity to observe patient.    Report given to SHERYL Glynn.     Antionette Cloud   6/3/2017  7:09 PM

## 2017-06-04 NOTE — SIGNIFICANT EVENT
Staff responded to pt's room when chair alarm alerted. Pt was found on floor in front of chair. Pt stated she was trying to get up to the bathroom alone because she does everything for herself at home, but her legs gave out when she was trying to stand up and slid down to her bottom on the floor. Pt denies hitting her head. Denies any pain. No red spots or injuries noted upon assessment. Dr. Young paged, awaiting return call to notify. Pt will be moving to a room within view of the nurse's station.

## 2017-06-04 NOTE — PLAN OF CARE
Problem: Goal Outcome Summary  Goal: Goal Outcome Summary  Outcome: No Change  SPO2 95% RA.  Fine crackles to bilat bases on lung auscultation.  Denies pain.  Up to void frequently.  Alert to self, otherwise confused.      Face to face report given with opportunity to observe patient.    Report given to Antionette LOWE RN.    Gretta Green   6/4/2017  7:05 AM

## 2017-06-04 NOTE — PROGRESS NOTES
[facility]  Progress Note            Subjective:   Up in chair, much less somnulent compared to yesterday. Min cough. Denies sob                 Medications:     Current Facility-Administered Medications Ordered in Epic   Medication Dose Route Frequency Last Rate Last Dose     insulin detemir (LEVEMIR) injection 10 Units  10 Units Subcutaneous At Bedtime         benzocaine (ORAJEL/ANBESOL) 10 % gel   Mouth/Throat Q3H PRN         glucose 40 % gel 15-30 g  15-30 g Oral Q15 Min PRN        Or     dextrose 50 % injection 25-50 mL  25-50 mL Intravenous Q15 Min PRN        Or     glucagon injection 1 mg  1 mg Subcutaneous Q15 Min PRN         insulin aspart (NovoLOG) inj (RAPID ACTING)  1-7 Units Subcutaneous TID AC   1 Units at 06/03/17 1632     insulin aspart (NovoLOG) inj (RAPID ACTING)  1-5 Units Subcutaneous At Bedtime   1 Units at 06/03/17 2210     levofloxacin (LEVAQUIN) infusion 250 mg  250 mg Intravenous Q48H         multivitamin (OCUVITE) tablet 1 tablet  1 tablet Oral BID   1 tablet at 06/03/17 1627     hypromellose-dextran (ARTIFICAL TEARS) ophthalmic solution 1 drop  1 drop Both Eyes Q2H PRN         0.9% sodium chloride infusion   Intravenous Continuous 50 mL/hr at 06/03/17 0928       naloxone (NARCAN) injection 0.1-0.4 mg  0.1-0.4 mg Intravenous Q2 Min PRN         enoxaparin (LOVENOX) injection 30 mg  30 mg Subcutaneous Q24H   30 mg at 06/03/17 1627     acetaminophen (TYLENOL) tablet 325 mg  325 mg Oral Q6H PRN   325 mg at 06/03/17 1240     amLODIPine (NORVASC) tablet 7.5 mg  7.5 mg Oral Daily   7.5 mg at 06/03/17 0926     aspirin EC EC tablet 81 mg  81 mg Oral Daily   81 mg at 06/03/17 0922     carvedilol (COREG) tablet 6.25 mg  6.25 mg Oral BID w/meals   6.25 mg at 06/03/17 1637     fluticasone-vilanterol (BREO ELLIPTA) 100-25 MCG/INH oral inhaler 1 puff  1 puff Inhalation Daily   1 puff at 06/03/17 1017     furosemide (LASIX) tablet 40 mg  40 mg Oral Daily   40 mg at 06/03/17 0925     gabapentin  (NEURONTIN) capsule 400 mg  400 mg Oral BID   400 mg at 06/03/17 2204     mirtazapine (REMERON) tablet 30 mg  30 mg Oral At Bedtime   30 mg at 06/03/17 2204     nitroglycerin (NITROSTAT) sublingual tablet 0.4 mg  0.4 mg Sublingual Q5 Min PRN         oxybutynin (DITROPAN) tablet 10 mg  10 mg Oral At Bedtime   10 mg at 06/03/17 2205     polyethylene glycol (MIRALAX/GLYCOLAX) Packet 17 g  17 g Oral Every Other Day   17 g at 06/03/17 0915     simvastatin (ZOCOR) tablet 20 mg  20 mg Oral At Bedtime   20 mg at 06/03/17 2205     traMADol (ULTRAM) tablet 50 mg  50 mg Oral Q6H PRN         cholecalciferol (vitamin D) tablet 1,000 Units  1,000 Units Oral Daily   1,000 Units at 06/03/17 0924     glucose 40 % gel 15-30 g  15-30 g Oral Q15 Min PRN        Or     dextrose 50 % injection 25-50 mL  25-50 mL Intravenous Q15 Min PRN        Or     glucagon injection 1 mg  1 mg Subcutaneous Q15 Min PRN         No current [facility]-ordered outpatient prescriptions on file.       Review of Systems:   C: NEGATIVE for fever, chills, change in weight  E/M: NEGATIVE for ear, mouth and throat problems  R: NEGATIVE for significant cough or SOB  CV: NEGATIVE for chest pain, palpitations or peripheral edema               Physical Exam:   Vitals were reviewed  Patient Vitals for the past 24 hrs:   BP Temp Temp src Heart Rate Resp SpO2   06/04/17 0825 144/63 97  F (36.1  C) Tympanic 81 18 95 %   06/03/17 2218 134/59 98  F (36.7  C) Tympanic 74 18 96 %   06/03/17 1637 (!) 107/49 97.9  F (36.6  C) Tympanic 74 22 -   06/03/17 1017 - - - - - 93 %   06/03/17 0920 103/54 97.7  F (36.5  C) Tympanic 88 18 92 %     Constitutional: Awake, alert, cooperative.  Eyes:  sclera clear  Lungs: clear.   Cardiovascular: Regular rate and rhythm, normal S1 and S2, no S3 or S4, and no murmur noted.  Abdomen: nondistended  Neurologic: Awake, alert, oriented to name  Neuropsychiatric: Normal affect, mood, orientation, memory and insight.  Ext-no edema    Peripheral IV 06/02/17  Right Lower forearm (Active)   Site Assessment WDL except 6/4/2017  8:27 AM   Line Status Infusing 6/4/2017  8:27 AM   Phlebitis Scale 0-->no symptoms 6/4/2017  8:27 AM   Infiltration Scale 0 6/4/2017  8:27 AM   Extravasation? No 6/3/2017  1:00 PM   Dressing Intervention Dressing reinforced 6/2/2017 11:28 PM   Number of days:2       Rash 09/28/13 0211 Bilateral midline groin (Active)   Number of days:1345       Incision/Surgical Site 09/26/13 Midline Abdomen (Active)   Number of days:1347     Line/device assessment(s) completed for medical necessity         Data:     Results for orders placed or performed during the hospital encounter of 06/02/17 (from the past 24 hour(s))   Glucose by meter   Result Value Ref Range    Glucose 205 (H) 70 - 99 mg/dL   Glucose by meter   Result Value Ref Range    Glucose 170 (H) 70 - 99 mg/dL   Glucose by meter   Result Value Ref Range    Glucose 236 (H) 70 - 99 mg/dL   Glucose by meter   Result Value Ref Range    Glucose 185 (H) 70 - 99 mg/dL   Basic metabolic panel   Result Value Ref Range    Sodium 137 133 - 144 mmol/L    Potassium 4.6 3.4 - 5.3 mmol/L    Chloride 104 94 - 109 mmol/L    Carbon Dioxide 26 20 - 32 mmol/L    Anion Gap 7 3 - 14 mmol/L    Glucose 188 (H) 70 - 99 mg/dL    Urea Nitrogen 48 (H) 7 - 30 mg/dL    Creatinine 1.40 (H) 0.52 - 1.04 mg/dL    GFR Estimate 35 (L) >60 mL/min/1.7m2    GFR Estimate If Black 43 (L) >60 mL/min/1.7m2    Calcium 8.2 (L) 8.5 - 10.1 mg/dL   Lactic acid   Result Value Ref Range    Lactic Acid 1.8 0.4 - 2.0 mmol/L     All cardiac studies reviewed by me.  All imaging studies reviewed by me.         Assessment and Plan:   Principal Problem:    Pneumonia of left lower lobe due to infectious organism   clinically improving. Anticipate discharge 1-2 days  Active Problems:    Diabetes mellitus (H)  Cr improving but will cont to hold metformin. Using sliding scale but still high so will adjust dose of levimir    Hypertension    bp stable    General  weakness  PT suggested short vs long term NH placement.  has seen regarding. Will see what is avail tomorrow. Dr. Cagle to be updated    Lower UTI  cx shows ecoli sensitive to levaquin    Renal insuff  Cont to improve

## 2017-06-04 NOTE — PROVIDER NOTIFICATION
Dr. Young notified of pt needing to urinate every 15-20 minutes. Telephone orders with read back received to discontinue IV fluids, modify Ditropan to 10mg BID, and pyridium 100mg 2 times daily PRN.

## 2017-06-04 NOTE — PROGRESS NOTES
GAIT/AMBULATION: Pt ambul with FWW x 120 ft x 2 req min asst one and w/c push of another. Pt slightly SOB, p02 sats 87% at the lowest. Encouraged deep breathing during seated rest  EQUIPMENT NEEDS AT DISCHARGE: TBD  D/C RECOMMENDATIONS: Possible SNF short term stay.    COMMENTS: Pt demonstrates continued slight confusion. Pt had a fall this am when attempting to go to bathroom by herself. No signif pain or apparent injuries.

## 2017-06-05 NOTE — PROGRESS NOTES
Name: Louise Johnson    MRN#: [id number]    Reason for Hospitalization: General weakness [R53.1]  D-dimer, elevated [R79.1]  Pneumonia of left lower lobe due to infectious organism [J18.9]    JUSTIN: average    Discharge Date: June 5, 2017    Patient / Family response to discharge plan: agreeable    Follow-Up Appt: No future appointments.    Other Providers (Care Coordinator, County Services, PCA services etc): No    Discharge Disposition: nursing home  - [facility]     Patient's son Kwabena will provide transportation. Discharge orders have been faxed to [facility]. Patient's nurse Lorenzo updated on discharge plan/time.     Shima Hathaway

## 2017-06-05 NOTE — PROGRESS NOTES
Participated in care rounds, patient's son present (Herb).    Herb mentioned he would like his mom to go to [facility] for his first choice. Message left for Olga for referral. Will update patient and son when placement is found. If [facility] is unable to take patient, Patient/son are okay with [facility], [facility], or [facility]. Amelie from Case Management sent referrals over the weekend.    Patient's son Herb will also be providing transportation when discharged.

## 2017-06-05 NOTE — PLAN OF CARE
Problem: Goal Outcome Summary  Goal: Goal Outcome Summary  Outcome: No Change  Lungs are clear to auscultation,  SPO2 95% RA.  Up to void frequently.  Pt ate multiple snacks during the night.  Confused, but alert to self.     Face to face report given with opportunity to observe patient.    Report given to SHERYL Ventura.    Gretta Green   6/5/2017  7:14 AM

## 2017-06-05 NOTE — PLAN OF CARE
Problem: Goal Outcome Summary  Goal: Goal Outcome Summary  Outcome: Adequate for Discharge Date Met:  06/05/17  Patient pleasantly confused, denies pain, up to bathroom SBA, IV ABX given. Lung sounds clear, o2 sats mid 90's on Room air.     Problem: Pneumonia (Adult)  Goal: Signs and Symptoms of Listed Potential Problems Will be Absent or Manageable (Pneumonia)  Signs and symptoms of listed potential problems will be absent or manageable by discharge/transition of care (reference Pneumonia (Adult) CPG).   Outcome: Adequate for Discharge Date Met:  06/05/17 06/05/17 1354   Pneumonia   Problems Assessed (Pneumonia) fluid/electrolyte imbalance;respiratory compromise

## 2017-06-05 NOTE — PROGRESS NOTES
[facility] unable to take patient.  CSV shared that they are able to take patient today. Son Kwabena updated and will provide transportation.

## 2017-06-05 NOTE — PROGRESS NOTES
[facility] Practice Progress Note               Interval History:   Pt doing well no complaints               Review of Systems:   The 5 point Review of Systems is negative other than noted in the HPI             Medications:   I have reviewed this patient's current medications               Physical Exam:   Vitals were reviewed  Lungs:   No increased work of breathing, good air exchange, clear to auscultation bilaterally, no crackles or wheezing     Cardiovascular:   Normal apical impulse, regular rate and rhythm, normal S1 and S2, no S3 or S4, and no murmur noted     Musculoskeletal:   Generalized weakness nothing focal         Peripheral IV 06/02/17 Right Lower forearm (Active)   Site Assessment WDL 6/5/2017  6:00 AM   Line Status Saline locked 6/5/2017  6:00 AM   Phlebitis Scale 0-->no symptoms 6/5/2017  6:00 AM   Infiltration Scale 0 6/5/2017  6:00 AM   Extravasation? No 6/3/2017  1:00 PM   Dressing Intervention Dressing reinforced 6/2/2017 11:28 PM   Number of days:3       Rash 09/28/13 0211 Bilateral midline groin (Active)   Number of days:1346       Incision/Surgical Site 09/26/13 Midline Abdomen (Active)   Number of days:1348     Line/device assessment(s) completed for medical necessity         Data:   All laboratory data reviewed         Assessment and Plan:   Principal Problem:    Pneumonia of left lower lobe due to infectious organism    Assessment: resolving     Plan: will change to PO when able to dc to rehab  Active Problems:    Diabetes mellitus (H)    Assessment: good control for now     Plan: will cont long acting insulin for treatment as renal ins although improving is not appropriate    Hypertension    Assessment: cont rolled     Plan: will monitor    General weakness    Assessment: stable    Plan: needs rehab pending referral    Acute lower urinary tract infection    Assessment: resolved    Plan: will monitor    Renal insufficiency    Assessment: stable and improving     Plan: cont to monitor and cont therapy

## 2017-06-05 NOTE — PHARMACY
[facility]    Pharmacy    Antimicrobial Stewardship Note     Current antimicrobial therapy:  Anti-infectives (Future)    Start     Dose/Rate Route Frequency Ordered Stop    06/05/17 1300  levofloxacin (LEVAQUIN) infusion 250 mg     Comments:  DO NOT USE THIS FIELD FOR ADMIN INSTRUCTIONS; INFORMATION DOES NOT SHOW ON MAR. USE THE FIELD ABOVE MARKED ADMIN INSTRUCTIONS    250 mg  50 mL/hr over 60 Minutes Intravenous EVERY 24 HOURS 06/05/17 1050          Indication: CAP     Pertinent labs:  Creatinine   Creatinine   Date Value Ref Range Status   06/05/2017 1.17 (H) 0.52 - 1.04 mg/dL Final   06/04/2017 1.40 (H) 0.52 - 1.04 mg/dL Final   06/03/2017 1.61 (H) 0.52 - 1.04 mg/dL Final     WBC   WBC   Date Value Ref Range Status   06/03/2017 3.8 (L) 4.0 - 11.0 10e9/L Final   06/02/2017 5.3 4.0 - 11.0 10e9/L Final   05/14/2017 3.3 (L) 4.0 - 11.0 10e9/L Final     ANC No components found for: ANC     Culture Results:       Urine Culture Aerobic Bacterial [[id number]] (Abnormal)  Collected: 06/02/17 1035       Order Status: Completed Specimen: Urine Updated: 06/04/17 0717        Specimen Description Catheterized Urine        Culture Micro >100,000 colonies/mL Escherichia coli (A)        Micro Report Status FINAL 06/04/2017        Organism: >100,000 colonies/mL Escherichia coli       Blood culture [[id number]] Collected: 06/02/17 1130       Order Status: Completed Specimen: Blood Updated: 06/04/17 0707        Specimen Description Blood Left Hand        Special Requests PEDS BOTTLE ONLY        Culture Micro No growth after 2 days        Micro Report Status Pending       Blood culture [[id number]] Collected: 06/02/17 1140       Order Status: Completed Specimen: Blood Updated: 06/04/17 0707        Specimen Description Blood Arm        Special Requests AEROBIC 6ML ,ANAEROBIC 2 M.        Culture Micro No growth after 2 days        Micro Report Status Pending       Active MRSA Surveillance Culture [[id number]] Collected: 06/02/17  1509       Order Status: Completed Specimen: Swab from Nasopharyngeal Updated: 06/04/17 0635        Specimen Description Nares        Culture Micro No MRSA isolated        Micro Report Status FINAL 06/04/2017       Recommendations/Interventions:  1. None at this time    Lilian Phillips, Pharm.D.  June 5, 2017

## 2017-06-05 NOTE — PLAN OF CARE
Face to face report given with opportunity to observe patient.    Report given to SHERYL Glynn.     Antionette Cloud   6/4/2017  7:13 PM

## 2017-06-05 NOTE — DISCHARGE SUMMARY
[facility]    Discharge Summary  Hospitalist    Date of Admission:  6/2/2017  Date of Discharge:  6/5/2017  Discharging Provider: Boston Cagle  Date of Service (when I saw the patient): 06/05/17      Hospital Course   Louise Johnson was admitted on 6/2/2017.  The following problems were addressed during her hospitalization:    Principal Problem:    Pneumonia of left lower lobe due to infectious organism    Assessment: rewsolving     Plan: dc to NH with oral antibiotics will follow up as outpt  Active Problems:    Diabetes mellitus (H)    Assessment: stable with changes    Plan: will dc to NH for rehab on long acting insulin and adjust as needed    Hypertension    Assessment: stable    Plan: cont meds and follow as outpt    General weakness    Assessment: stble    Plan: will need rehab before returning home    Acute lower urinary tract infection    Assessment: resolved    Plan: monitor    Renal insufficiency    Assessment: improved     Plan: will need to adjust and follow      Boston Cagle    Significant Results and Procedures       Pending Results   These results will be followed up by ismael  Unresulted Labs Ordered in the Past 30 Days of this Admission     Date and Time Order Name Status Description    6/2/2017 1103 Blood culture Preliminary     6/2/2017 1103 Blood culture Preliminary           Code Status   DNR / DNI       Primary Care Physician   Boston Cagle          Discharge Disposition   Discharged to nursing home  Condition at discharge: Stable    Consultations This Hospital Stay   hospitals HEALTH SERVICES IP CONSULT  PHYSICAL THERAPY ADULT IP CONSULT  PHYSICAL THERAPY ADULT IP CONSULT    Time Spent on this Encounter   IBoston, personally saw the patient today and spent greater than 30 minutes discharging this patient.    Discharge Orders     General info for SNF   Length of Stay Estimate: Short Term Care: Estimated # of Days <30  Condition at Discharge:  Improving  Level of care:skilled   Rehabilitation Potential: Good  Admission H&P remains valid and up-to-date: Yes  Recent Chemotherapy: N/A  Use Nursing Home Standing Orders: Yes     Mantoux instructions   Give two-step Mantoux (PPD) Per Facility Policy Yes     Glucose monitor nursing POCT   Before meals and at bedtime     Activity - Up ad jian     DNR/DNI     Physical Therapy Adult Consult   Evaluate and treat as clinically indicated.    Reason:  Weakness from recent illness and deconditioning     Advance Diet as Tolerated   Follow this diet upon discharge: Regular       Discharge Medications   Current Discharge Medication List      START taking these medications    Details   levofloxacin (LEVAQUIN) 250 MG tablet Take 1 tablet (250 mg) by mouth daily  Qty: 3 tablet, Refills: 0    Associated Diagnoses: Pneumonia of left lower lobe due to infectious organism         CONTINUE these medications which have NOT CHANGED    Details   Furosemide (LASIX PO) Take 40 mg by mouth daily      NONFORMULARY Place 1 drop into the right eye 4 times daily      !! TRAMADOL HCL PO Take 50 mg by mouth daily       amLODIPine (NORVASC) 5 MG tablet Take 1 tablet (5 mg) by mouth daily  Qty: 30 tablet, Refills: 1    Associated Diagnoses: Essential hypertension with goal blood pressure less than 140/90      fluticasone-salmeterol (ADVAIR) 250-50 MCG/DOSE diskus inhaler Inhale 1 puff into the lungs 2 times daily      Insulin Detemir (LEVEMIR SC) Inject 7 Units Subcutaneous At Bedtime       Acetaminophen (TYLENOL PO) Take 500 mg by mouth every 6 hours as needed for mild pain or fever      MIRTAZAPINE PO Take 30 mg by mouth At Bedtime      ASPIRIN ADULT LOW STRENGTH PO Take 81 mg by mouth daily      CARVEDILOL PO Take 6.25 mg by mouth 2 times daily (with meals)      GABAPENTIN PO Take 400 mg by mouth 2 times daily       terbinafine (LAMISIL) 250 MG tablet Take 250 mg by mouth daily      OXYBUTYNIN CHLORIDE PO Take 10 mg by mouth At Bedtime        VITAMIN D, CHOLECALCIFEROL, PO Take 1,000 Units by mouth daily      multivitamin (OCUVITE) TABS Take 1 tablet by mouth 2 times daily       LISINOPRIL PO Take 40 mg by mouth daily      Simvastatin (ZOCOR PO) Take 20 mg by mouth At Bedtime       !! TRAMADOL HCL PO Take 50 mg by mouth every 6 hours as needed for moderate to severe pain      carboxymethylcellulose (REFRESH PLUS) 0.5 % SOLN ophthalmic solution Place 1 drop into both eyes daily as needed for dry eyes      NITROGLYCERIN SL Place 0.4 mg under the tongue every 5 minutes as needed for chest pain x3 prn      polyethylene glycol (MIRALAX/GLYCOLAX) powder Take 1 capful by mouth every other day        !! - Potential duplicate medications found. Please discuss with provider.      STOP taking these medications       METFORMIN HCL PO Comments:   Reason for Stopping:         METFORMIN HCL PO Comments:   Reason for Stopping:             Allergies   Allergies   Allergen Reactions     Sulfasalazine Anaphylaxis     Pt takes asa & ibuprofen at home     Cimetidine      Codeine Sulfate      Tolerated percocet, lortab, and ultram     Diflucan      Iodine I 131 Tositumomab      Morphine Sulfate      No Clinical Screening - See Comments      Flu vaccine     Tagamet      Penicillins Rash     Entire body     Data   Most Recent 3 CBC's:  Recent Labs   Lab Test  06/05/17   0520  06/03/17   0529  06/02/17   0957  05/14/17   1134   WBC   --   3.8*  5.3  3.3*   HGB   --   13.1  14.4  13.3   MCV   --   94  93  99   PLT  149*  166  202  149*      Most Recent 3 BMP's:  Recent Labs   Lab Test  06/05/17   0520  06/04/17   0519  06/03/17   0529   NA  136  137  136   POTASSIUM  4.2  4.6  4.6   CHLORIDE  102  104  102   CO2  25  26  25   BUN  37*  48*  69*   CR  1.17*  1.40*  1.61*   ANIONGAP  9  7  9   KAILA  8.3*  8.2*  8.3*   GLC  192*  188*  118*     Most Recent 2 LFT's:  Recent Labs   Lab Test  06/02/17   0957  09/20/16   1506   AST  34  66*   ALT  33  77*   ALKPHOS  96  154*    BILITOTAL  0.5  0.3     Most Recent INR's and Anticoagulation Dosing History:  Anticoagulation Dose History     Recent Dosing and Labs Latest Ref Rng & Units 9/25/2013 5/25/2016 6/2/2017    INR 0.80 - 1.20 1.12 1.16 1.15        Most Recent 3 Troponin's:  Recent Labs   Lab Test  05/25/16   1340   TROPI  0.059*     Most Recent Cholesterol Panel:No lab results found.  Most Recent 6 Bacteria Isolates From Any Culture (See EPIC Reports for Culture Details):  Recent Labs   Lab Test  06/02/17   1509  06/02/17   1140  06/02/17   1130  06/02/17   1035  09/20/16   1257  09/12/16   2030   CULT  No MRSA isolated  No growth after 3 days  No growth after 3 days  >100,000 colonies/mL Escherichia coli*  No MRSA isolated  No MRSA isolated     Most Recent TSH, T4 and A1c Labs:  Recent Labs   Lab Test  06/02/17   0957  09/28/13   0542   TSH  1.65   --    A1C   --   7.2*

## 2017-06-05 NOTE — PROGRESS NOTES
Pre Admission Screening completed [id number]    Francesca at [facility] updated on discharge estimated time.

## 2017-06-05 NOTE — PLAN OF CARE
Patient discharged at 3:39 PM via wheel chair accompanied by son and staff. Prescriptions sent to patients preferred pharmacy. All belongings sent with patient.     Discharge instructions reviewed with Zahira. Listed belongings gathered and returned to patient.     Patient discharged to [facility].   Report called to Nursing Home:  Zahira [phone]    Core Measures and Vaccines  Core Measures applicable during stay: No. If yes, state diagnosis: pneumona  Pneumonia and Influenza given prior to discharge, if indicated: No    Surgical Patient   Surgical Procedures during stay: N'A  Did patient receive discharge instruction on wound care and recognition of infection symptoms? No    MISC  Follow up appointment made:  No  Home and hospital aquired medications returned to patient: No  Patient reports pain was well managed at discharge: Yes

## 2017-06-17 PROBLEM — I21.19 ACUTE TRANSMURAL INFERIOR WALL MI (H): Status: ACTIVE | Noted: 2017-01-01

## 2017-06-17 NOTE — ED PROVIDER NOTES
History     Chief Complaint   Patient presents with     Chest Pain     started at 2330     Shortness of Breath     HPI  Louise Johnson is a 88 year old female who presents via ambulance with chest pain at 11:30p, and inferior MI noted on ambulance ECG.  She very clearly states she does not want to be sent to [place] for aggressive care, and I spoke with 2 family members including executor of POA and they very clearly concur that we should not transfer, and try to keep her comfortable with medical management.      Also she states she is a DNR.    I have reviewed the Medications, Allergies, Past Medical and Surgical History, and Social History in the Epic system.         Review of Systems she has not had any obvious illness recently.      Physical Exam   BP: 113/67  Pulse: 90  Temp: (!) 95.7  F (35.4  C)  Resp: 18  Weight: 89.8 kg (198 lb)  SpO2: 97 %  Physical Exama well woman in no pain (after one nitro SL) .  Pale.  Not sweaty.  Heart reg.  Lungs clear anteriorly.  Abd soft.  No DVT signs or ankle edema.      ECG shows a clear inferior wall MI.  CXR shows no acute signs.  Some labs still pending at time of dictation.    ED Course   She received asa 324mg in ambulance, was given oxygen, and here given ticagrelor 180mg and heparin bolus.  After decision made to not do aggressive care, I initiated heparin drip and nitro drip for medical management.  For the inferior MI also gave 500ml bolus for preload issues.  She remained pain free in the ER.  Will be admitted here for comfort measures.    ED Course     Procedures             Critical Care time:  none               Labs Ordered and Resulted from Time of ED Arrival Up to the Time of Departure from the ED   CBC WITH PLATELETS DIFFERENTIAL   INR   PARTIAL THROMBOPLASTIN TIME   COMPREHENSIVE METABOLIC PANEL   TROPONIN I   IV ACCESS   CARDIAC CONTINUOUS MONITORING       Assessments & Plan (with Medical Decision Making)     I have reviewed the nursing notes.    I have  reviewed the findings, diagnosis, plan and need for follow up with the patient.       New Prescriptions    No medications on file       Final diagnoses:   Acute myocardial infarction, initial episode of care (H)       6/16/2017   [facility] EM[facility]GENCY [facility]PART[facility]     Hosea Diaz MD  06/17/17 0043

## 2017-06-17 NOTE — H&P
DATE OF ADMISSION:  06/17/2017       CHIEF COMPLAINT:  Chest pain.      HISTORY OF PRESENT ILLNESS:  Louise Johnson is an 88-year-old woman with known coronary artery disease, status post stents, who developed substernal chest pain 8/10 severity relieved with sublingual nitroglycerin x 1 this evening.  She also had shortness of breath with oxygen saturation of 92% treated with 2L nasal cannula oxygen.  She was given 324 mg aspirin.  She was noted to have ST elevation in the inferior leads by EMS personnel.   In the emergency room, she no longer had chest pain and oxygen saturation was stable, but she she has ongoing ST elevation in the inferior leads with reciprocal changes in the lateral leads and is admitted for acute inferior STEMI.  She received 1 dose of Brilinta 180 mg in the emergency room and was started on a heparin drip and a nitroglycerin drip.  She also was noted to have hypotension with systolic pressures in the 80s (after sublingual nitroglycerin) and received IV fluids in the emergency room.  Troponin obtained in the emergency room was 0.052.  She also notes a 3 - 4 day history of generalized weakness.  She had low blood pressure approximately 6:00 this evening (at [facility]) with poor appetite, nausea and vomiting after taking a protein drink this evening.  Yesterday evening she had chest pain which was relieved with Maalox. She was thirsty earlier tonight. She is status post hospital admission 6/2 - 6/5/2017 for left lower lobe pneumonia and UTI.     ALLERGIES:  Include sulfasalazine, which caused anaphylaxis, cimetidine,  codeine (she is able to tolerate Percocet, Lortab and Ultram), Diflucan, iodine, morphine, flu vaccine, Tagamet and penicillin.      ADMISSION MEDICATIONS:   1.  Amlodipine 7.5 mg p.o. daily.   2.  Refresh Plus 0.5% ophthalmic solution 1 drop both eyes daily p.r.n. dry eyes.   3.  Coreg 6.25 mg p.o. b.i.d.   4.  Advair Diskus 250/50 one puff b.i.d.   5.  Gabapentin 400 mg  b.i.d.   6.  Levemir 7 units subq at bedtime.   7.  Lisinopril 40 mg p.o. daily.   8.  Remeron 30 mg p.o. at bedtime.   9.  Sublingual nitroglycerin 0.4 mg p.r.n.   10.  MiraLax 17 grams every other day.   11.  Simvastatin 20 mg p.o. daily.   12.  Ultram 50 mg daily in a.m. and every 6 hours p.r.n. moderate to severe pain.   13.  Tylenol 500 mg q.6 h. p.r.n. mild pain.   14.  Vitamin D 1000 international units p.o. daily.   15.  Aspirin 81 mg p.o. daily.   16.  Furosemide 40 mg p.o. daily.   17.  Ocuvite 1 tablet p.o. b.i.d.   18.  Oxybutynin 10 mg p.o. at bedtime.   19.  Lamisil 250 mg p.o. daily.      PAST MEDICAL HISTORY:  Known coronary artery disease, status post stents.  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  She also has a history of TIA, atrial fibrillation, diverticulosis, hyperlipidemia and a history of previous intracranial bleeding after a fall (incidental finding of a small bleed on CT head May 2016) as well as DJD. She has a history of macular degeneration.     PAST SURGICAL HISTORY:  Significant for angioplasty in 1993,1995 and 2003, left knee arthroscopy, left total knee arthroplasty in 2009, bilateral cataract surgery in 2008, cholecystectomy, repair of ventral hernia 09/26/2013, laparoscopic appendectomy, laparotomy with lysis of adhesions 09/26/2013 and sigmoidectomy for diverticulitis.      SOCIAL HISTORY:  She quit cigarettes over 40 years ago.  She drinks at most 1 beer every 6 months.  She is  and was at [facility] and more recently has been staying at [facility] for rehabilitation status post hospital admission for pneumonia with a fall at the beginning of this month. Her daughter states 2 months ago she was ambulating with her walker, but since the hospital stay she is not able to walk without assistance.     FAMILY HISTORY:  Significant for coronary artery disease in her daughter, brother  with abdominal aortic aneurysm, hypertension in her mother and diabetes in her mother, brother and 2 sisters.      REVIEW OF SYSTEMS:  As per HPI.      OBJECTIVE:     GENERAL:  This is a pleasant, well-developed, well-nourished woman in no acute distress.  She is awake but somewhat confused, disoriented to place and repeatedly asks the same question.  She is able to recognize her daughter.   VITAL SIGNS:  Temperature 96 degrees Fahrenheit, pulse 80, respirations 13, blood pressure 118/69, pulse oximetry 96% on 2 liters nasal cannula.   HEENT:  Normocephalic, atraumatic.  Pupils reactive to light bilaterally, tympanic membranes pearly gray bilaterally.  Oropharynx without erythema or exudate.  Dentures present.   NECK:  Without lymphadenopathy or thyromegaly appreciated.   HEART:  Regular without murmurs, rubs or gallops appreciated.   LUNGS:  Clear to auscultation without rales, rhonchi or wheezes. There is good air movement to the lung bases.  ABDOMEN:  Soft and nontender with normoactive bowel sounds.  No masses or hepatosplenomegaly appreciated.     EXTREMITIES:  Right lower extremity without edema.  Left lower extremity with 1+ pitting pretibial edema (patient's daughter is present and states this is chronic status post knee surgery).  Posterior tibial pulses present bilaterally.  Sensation to light touch intact in hands and feet.   5/5 bilaterally, plantar flexion 5/5 bilaterally.  Plantar response is downgoing bilaterally.   NEUROLOGIC:  She is awake and answers brief questions appropriately, is disoriented to place, but not person.      LABORATORY DATA:  Sodium 130, potassium 4.8, chloride 92, bicarbonate 22, BUN 22, creatinine 1.64 (previous BUN and creatinine at time of discharge from previous hospital stay was not significantly elevated).  Calcium 8.7, albumin 3.0.  LFTs within normal limits except for mildly elevated AST of 51 units per liter.  Troponin I elevated at 0.52 mcg per liter.  WBC 5.4,  hemoglobin 14.3, hematocrit 41.1, platelets 236 with 55% neutrophils.  INR 1.01.  PTT 24.  UA positive for glucose, but otherwise negative (straight cath).      IMAGING:  Portable chest x-ray:  I do not appreciate any infiltrate or effusion.        EKG reveals 1.5 mm elevation in lead II and 2 mm elevation in leads III and aVF with 1 mm ST elevation in leads V4 through V6 with reciprocal changes in leads I and aVL.      ASSESSMENT AND PLAN:     1.  An  88-year-old woman with history of known coronary artery disease, status post stents, presents with acute inferior wall myocardial infarction (STEMI).  The patient does not want aggressive cares such as thrombolytics or transfer to [place] for cardiac catheterization.  Admit here and treated with intravenous heparin, intravenous nitroglycerin, aspirin, Brilinta, statin, Coreg and angiotensin-converting enzyme nhibitor as tolerated, oxygen and monitored bed, serial cardiac enzymes.  Watch for evidence of congestive heart failure, both clinically and follow weights and BNP.  Hold Amlodipine at present due to hypotension.  2.  Hypotension initially which resolved with intravenous fluids.  Watch closely.  May need to back off on nitroglycerin and also may need to back off on beta blocker and angiotensin-converting enzyme inhibitor depending how her blood pressures run.  Will monitor closely.   3.  History of essential hypertension.  Continue medications if tolerated; however, again, we may need to hold some medications if blood pressure drops.   4.  Diabetes mellitus, which is insulin requiring.  Continue insulin and monitor blood sugars closely.   5.  History of recent urinary tract infection.  Current urinalysis is not suggestive of urinary tract infection.   6.  Acute renal insufficiency.  Give intravenous fluids and monitor.   7.  Hyponatremia.  Suspect this may be related to vomiting.  Give low dose normal saline and monitor.   8.  Generalized weakness with recent  falls.  Will need to restart physical therapy and occupational therapy, but hold at this time because of acute myocardial  infarction.   9.  Deep venous thrombosis prophylaxis.  The patient is on intravenous heparin.   10.  Estimated length of stay approximately 3-5 days for treatment of acute myocardial infarction and monitoring.  Also plan to check echocardiogram in several days when available to assess wall motion abnormalities and ejection fraction.    11.  Code status:  Discussed with the patient's daughter, Yogesh, who is here with her tonight who states she has discussed this with the patient and her siblings and they would like DNR/DNI.  Also of note, the emergency room physician spoke with the patient's daughter earlier tonight regarding aggressiveness of care including thrombolytics and/or transfer to [place] for possible angioplasty.  She would not like transfer at this time, but treatment with heparin and other medications as noted.   12.  Previous history of atrial fibrillation.  Cardiac monitoring at this time.   13.  History of diverticulitis.   14.  Hyperlipidemia.  Continue medication.   15.  Degenerative joint disease status post left total knee replacement with chronic venous stasis edema, left lower extremity.   16.  History of intracranial bleed, status post fall (incidental finding on CT May 2016, resolved on subsequent CT).   17.  History of transient ischemic attacks.   18.  History of depression.  19.  History of Macular Degeneration.        YOGESH ALEXANDRA MD             D: 2017 03:33   T: 2017 08:43   MT: CASSANDRA      Name:     MONTRELL ALCANTAR   MRN:      [id number]        Account:      [id number]   :      1929           Admitted:     [id number]      Document: [id number]       cc: Boston Cagle DO

## 2017-06-17 NOTE — IP AVS SNAPSHOT
Louise Alcantar #[id number] (CSN: [id number])  (88 year old F)  (Adm: 17)     HIMED-3116 -3116-1               [facility]: [phone]            Patient Demographics     Patient Name Sex          Age SSN Address Contact Numbers    Louise Alcantar Female 1929 (88 year old) [id number]  [place] [id number] [phone]      Emergency Contact(s)     Name Relation Home Work Mobile    TADEO ALCANTAR Son [phone]    VIJI BUCKLEY Daughter [phone]    ALICIA RODRÍGUEZchild [phone]    KB WASHINGTON Daughter [phone]  none      Admission Information     Attending Provider Admitting Provider Admission Type Admission Date/Time    Boston Cagle, Francesca Mishra MD Emergency 17  0008    Discharge Date Hospital Service Auth/Cert Status Service Area     Family Medicine Incomplete [facility] SERVICES    Unit Room/Bed Admission Status       [facility] 3116 /3116-1 Admission (Confirmed)       Admission     Complaint    Acute transmural inferior wall MI (H), Acute transmural inferior wall MI (H)      Hospital Account     Name Acct ID Class Status Primary Coverage    Louise Alcantar [id number] Inpatient Open MEDICARE - MEDICARE FOR HB SUPPLEMENT            Guarantor Account (for Hospital Account #[id number])     Name Relation to Pt Service Area Active? Acct Type    Louise Alcantar Self RANGE Yes Personal/Family    Address Phone          [address]  [place] [address] [phone](H)              Coverage Information (for Hospital Account #[id number])     1. MEDICARE/MEDICARE FOR HB SUPPLEMENT     F/O Payor/Plan Precert #    MEDICARE/MEDICARE FOR HB SUPPLEMENT     Subscriber Subscriber #    Louise Alcantar [id number]    Address Phone    ATTN CLAIMS  [address]  [place] [address] [phone]          2. BCBS/BCBS PLATINUM BLUE     F/O Payor/Plan Precert #     "BCBS/BCBS PLATINUM BLUE     Subscriber Subscriber #    Louise Johnson [id number]    Address Phone    [address] [address]  [place], [place] [id number] [phone]                                                      INTERAGENCY TRANSFER FORM - PHYSICIAN ORDERS   6/17/2017                       [facility]: [phone]            Attending Provider: Boston Cagle DO     Allergies:  Sulfasalazine, Cimetidine, Codeine Sulfate, Diflucan, Iodine I 131 Tositumomab, Morphine Sulfate, No Clinical Screening - See Comments, Tagamet, Penicillins    Infection:  None   Service:  FAMILY MEDIC    Ht:  1.626 m (5' 4\")   Wt:  85.5 kg (188 lb 7.9 oz)   Admission Wt:  89.8 kg (198 lb)    BMI:  32.35 kg/m 2   BSA:  1.96 m 2            ED Clinical Impression     Diagnosis Description Comment Added By Time Added    Acute myocardial infarction, initial episode of care (H) [I21.3] Acute myocardial infarction, initial episode of care (H) [I21.3]  Hosea Diaz MD 6/17/2017 12:38 AM      Hospital Problems as of 6/20/2017              Priority Class Noted POA    Acute transmural inferior wall MI (H) Medium  6/17/2017 Yes      Non-Hospital Problems as of 6/20/2017              Priority Class Noted    Small bowel obstruction (H)   9/25/2013    Anemia Medium  11/29/2013    Chronic coronary artery disease Medium  11/29/2013    Diabetes mellitus (H) Medium  11/29/2013    Gastrointestinal hemorrhage Medium  11/29/2013    Hypertension Medium  11/29/2013    History of gastrointestinal disease Medium  11/29/2013    Pulmonary hypertension (H) Medium  12/3/2013    Congestive heart failure (H) Medium  12/4/2013    SOB (shortness of breath) Medium  5/6/2014    Pulmonary edema   5/7/2014    ICB (intracranial bleed) (H) Medium  5/25/2016    Abdominal pain, generalized High  7/14/2016    Constipation Medium  7/14/2016    Abdominal pain Medium  7/14/2016    Right rib fracture Medium  9/12/2016    Disorientation High  9/20/2016    Falls " frequently Medium  9/20/2016    Closed fracture of multiple ribs of right side with routine healing Medium  9/20/2016    Advance care planning   1/31/2017    Pneumonia of left lower lobe due to infectious organism Medium  6/2/2017    General weakness Medium  6/2/2017    Pneumonia Medium  6/2/2017    Acute lower urinary tract infection Medium  6/4/2017    Renal insufficiency Medium  6/4/2017      Code Status History     Date Active Date Inactive Code Status Order ID Comments User Context    6/20/2017  7:30 AM  DNR/DNI [id number]  Boston Cagle, DO Outpatient    6/18/2017  5:52 PM 6/20/2017  7:30 AM DNR/DNI [id number] Code status discussion is appropriately documented in the chart. Francesca Franco MD Inpatient    6/17/2017  2:54 AM 6/18/2017  5:52 PM DNR/DNI [id number]  Francesca Franco MD Inpatient    6/5/2017  1:34 PM 6/17/2017  2:54 AM DNR/DNI [id number]  Boston Cagle, DO Outpatient    6/2/2017  1:55 PM 6/5/2017  1:34 PM DNR/DNI [id number]  Boston Cagle, DO Inpatient    9/22/2016  1:29 PM 6/2/2017  1:55 PM DNR/DNI [id number]  Boston Cagle, DO Outpatient    9/20/2016  2:37 PM 9/22/2016  1:29 PM DNR/DNI [id number]  Boston Cagle, DO Inpatient    9/13/2016  7:51 AM 9/20/2016  2:37 PM Full Code [id number]  Chandana Riojas MD Outpatient    9/12/2016  7:19 PM 9/13/2016  7:51 AM Full Code [id number]  Chandana Riojas MD Inpatient    7/15/2016  8:34 AM 9/12/2016  7:19 PM DNR/DNI [id number]  Boston Cagle, DO Outpatient    7/14/2016  1:45 PM 7/15/2016  8:34 AM DNR/DNI [id number]  Boston Cagle, DO Inpatient    5/26/2016  4:46 PM 7/14/2016  1:45 PM DNR/DNI [id number]  Boston Cagle, DO Outpatient    5/25/2016  6:12 PM 5/26/2016  4:46 PM Full Code [id number]  Boston Cagle,  Inpatient    5/9/2014 11:26 AM 5/25/2016  6:12 PM Full Code [id number]  Boston Cagle,  Outpatient    5/6/2014  9:15 PM 5/9/2014 11:26 AM Full Code [id number]  Chandana Riojas MD Inpatient     10/4/2013 12:34 PM 5/6/2014  9:15 PM Full Code [id number]  Boston Cagle DO Outpatient    9/26/2013  7:24 PM 10/4/2013 12:34 PM Full Code [id number]  Sara Green RN Inpatient    9/25/2013  8:51 PM 9/26/2013  7:24 PM Full Code [id number]  Bob Muller RN Inpatient      Current Code Status     Date Active Code Status Order ID Comments User Context       Prior         Medication Review      CONTINUE these medications which have NOT CHANGED        Dose / Directions Comments    ASPIRIN ADULT LOW STRENGTH PO   Indication:  ecotrim        Dose:  81 mg   Take 81 mg by mouth daily   Refills:  0        carboxymethylcellulose 0.5 % Soln ophthalmic solution   Commonly known as:  REFRESH PLUS        Dose:  1 drop   Place 1 drop into both eyes daily as needed for dry eyes   Refills:  0        CARVEDILOL PO        Dose:  6.25 mg   Take 6.25 mg by mouth 2 times daily (with meals)   Refills:  0        fluticasone-salmeterol 250-50 MCG/DOSE diskus inhaler   Commonly known as:  ADVAIR        Dose:  1 puff   Inhale 1 puff into the lungs 2 times daily   Refills:  0        GABAPENTIN PO        Dose:  400 mg   Take 400 mg by mouth 2 times daily   Refills:  0        LASIX PO        Dose:  40 mg   Take 40 mg by mouth daily   Refills:  0        LEVEMIR SC        Dose:  7 Units   Inject 7 Units Subcutaneous At Bedtime   Refills:  0        LISINOPRIL PO        Dose:  40 mg   Take 40 mg by mouth daily   Refills:  0        MIRTAZAPINE PO        Dose:  30 mg   Take 30 mg by mouth At Bedtime   Refills:  0        multivitamin Tabs tablet        Dose:  1 tablet   Take 1 tablet by mouth 2 times daily   Refills:  0        NITROGLYCERIN SL        Dose:  0.4 mg   Place 0.4 mg under the tongue every 5 minutes as needed for chest pain x3 prn   Refills:  0        OXYBUTYNIN CHLORIDE PO        Dose:  10 mg   Take 10 mg by mouth At Bedtime   Refills:  0        polyethylene glycol powder   Commonly known as:  MIRALAX/GLYCOLAX        Dose:   1 capful   Take 1 capful by mouth every other day   Refills:  0        * TRAMADOL HCL PO        Dose:  50 mg   Take 50 mg by mouth daily   Refills:  0        * TRAMADOL HCL PO        Dose:  50 mg   Take 50 mg by mouth every 6 hours as needed for moderate to severe pain   Refills:  0        TYLENOL PO        Dose:  500 mg   Take 500 mg by mouth every 6 hours as needed for mild pain or fever   Refills:  0        VITAMIN D (CHOLECALCIFEROL) PO        Dose:  1000 Units   Take 1,000 Units by mouth daily   Refills:  0        ZOCOR PO        Dose:  20 mg   Take 20 mg by mouth At Bedtime   Refills:  0        * Notice:  This list has 2 medication(s) that are the same as other medications prescribed for you. Read the directions carefully, and ask your doctor or other care provider to review them with you.      STOP taking     amLODIPine 5 MG tablet   Commonly known as:  NORVASC           NONFORMULARY           terbinafine 250 MG tablet   Commonly known as:  lamISIL                   After Care     Activity - Up ad jian           Advance Diet as Tolerated       Follow this diet upon discharge: Regular       General info for SNF       Length of Stay Estimate: Long Term Care  Condition at Discharge: Stable  Level of care:skilled   Rehabilitation Potential: Fair  Admission H&P remains valid and up-to-date: Yes  Recent Chemotherapy: N/A  Use Nursing Home Standing Orders: Yes       Glucose monitor nursing POCT       Before meals and at bedtime       Mantoux instructions       Give two-step Mantoux (PPD) Per Facility Policy Yes               Further instructions from your care team         Recognizing a Heart Attack or Angina  If you have risk factors for heart problems, you should always watch for signs of angina or a heart attack. If you have a sudden heart problem, getting treatment right away could save your life.   Risk factors for a heart attack include advanced age, high cholesterol, high blood pressure, having a family member  who had a heart attack before the age of 50, diabetes, smoking, and stress. There are other risk factors including eating a high-fat diet and getting minimal exercise.  Understanding angina and heart attack    Angina is a painful burning, tightness, or pressure in the chest, back, neck, throat, or jaw. It means not enough blood is getting to the heart. This is usually from a blocked artery in the heart. Angina is a sign that you may be having, or are about to have, a heart attack. It needs to be addressed by a healthcare provider right away.    A heart attack, also known as acute myocardial infarction, or AMI, is what happens when blood can't get to part of the heart muscle. That part of the heart muscle is damaged and starts to die. If enough of the heart is affected, it will severely reduce its ability to provide blood to the rest of the body. It may cause death. It is vital to get help as soon as possible for a heart attack.  Stable angina versus unstable angina  Stable angina, also known as chronic angina, has a typical pattern. It occurs predictably with physical exertion or strong emotion. Nitroglycerin, rest, or both, will easily relieve stable angina symptoms. Stable angina symptoms will most likely feel the same each time you have them. It is important to discuss these symptoms with your healthcare provider. They can be a warning sign for a future heart attack.  Unstable angina causes unexpected or unpredictable symptoms, commonly occurring at rest, and is a medical emergency. Angina is also considered unstable if resting and nitroglycerin doesn't relieve symptoms, It's also unstable if symptoms are worsening, occurring more often, or are lasting longer. These symptoms suggest a severe blockage or a spasm of a heart artery. Unstable angina is commonly a sign of an active heart attack. Remember the following tips:    Stable angina symptoms should go away with rest or medicine. If they don t go away, call  911!    Stable angina symptoms last for only a few minutes. If they last longer than that, or if they go away and come back, you may be having a heart attack. Call 911!    If you have shortness of breath, cold sweat, nausea, or lightheadedness, call 911!  For new-onset angina, there is only one response: ?call 911! You should never diagnose angina by yourself. If these symptoms are new, or worse than usual, call 911!  Warning signs of a heart attack  If you have symptoms that you can t explain, call 911 right away. Do not drive yourself to the emergency room. The following are warning signs of a possible heart attack:    Chest discomfort. Most heart attacks involve discomfort in the center of the chest that lasts more than a few minutes, or that goes away and comes back. It can feel like uncomfortable pressure, squeezing, fullness or pain.    Discomfort in other areas of the upper body. Symptoms can include pain or discomfort in one or both arms, the back, neck, jaw, or stomach.    Shortness of breath with or without chest discomfort.    Other signs may include breaking out in a cold sweat, nausea, or lightheadedness.  Note for women: Like men, women commonly have chest pain or discomfort as a heart attack symptom. But women are somewhat more likely than men to have other common symptoms, such as shortness of breath, nausea and vomiting, back pain, or jaw pain.  Note for older adults: Older people may also have atypical symptoms of a heart attack. These symptoms include fainting, weakness, or confusion. Ignoring these symptoms can lead to critical illness or death. They should be investigated right away.  If you've had a heart attack: People who have had one heart attack are at risk for having another. Your provider may prescribe medicine such as nitroglycerin to take when chest pain starts. Or you may need medicines to lower your heart rate and blood pressure to prevent angina and another heart attack. Remember to  take any medicines your provider has given and do not stop them without speaking with him or her first.     If you have diabetes: silent heart problems  Over time, high blood sugar can damage nerves in your body. This may keep you from feeling pain caused by a heart problem, leading to a  silent  heart problem. If you don t feel symptoms, you are less able to recognize that you may be having a heart attack and get treatment right away. Talk to your healthcare provider about how to lower your risk for silent heart problems.   Date Last Reviewed: 6/1/2016 2000-2017 [employer]. [address] [place] [address]. All rights reserved. This information is not intended as a substitute for professional medical care. Always follow your healthcare professional's instructions.          Referrals     HOSPICE REFERRAL       **Order classes of: [facility], [facility] and [facility] will route to the Home Care and Hospice Referral Pool.  Home Care or Hospice will then contact the patient to schedule their appointment.**    If you do not hear from Home Care and Hospice, or you would like to call to schedule, please call the referring place of service that your provider has listed below.  ______________________________________________________________________    Your provider has referred you to: [facility] [place] [phone]   [email or web address]    Extended Emergency Contact Information  Primary Emergency Contact: TADEO ALCANTAR   [facility]  Home Phone: [phone]  Mobile Phone: [phone]  Relation: Son  Secondary Emergency Contact: VIJI BUCKLEY   [facility]  Home Phone: [phone]  Mobile Phone: [phone]  Relation: Daughter    Patient Anticipated Discharge Date: 6/20/17   RN, PT, HHA to begin 24 - 48 hours after discharge.  PLEASE EVALUATE AND TREAT (Evaluation timeline is 24 - 48 hrs. Please call if there is need  for a variance to this timeline).    REASON FOR REFERRAL: Assessment & Treatment: RN    ADDITIONAL SERVICES NEEDED: end of life    OTHER PERTINENT INFORMATION: Patient was last seen by provider on 6/20/17 for CAD.    No current outpatient prescriptions on file.    Patient Active Problem List:     Small bowel obstruction (H)     SOB (shortness of breath)     Pulmonary edema     ICB (intracranial bleed) (H)     Abdominal pain, generalized     Constipation     Abdominal pain     Anemia     Chronic coronary artery disease     Congestive heart failure (H)     Diabetes mellitus (H)     Gastrointestinal hemorrhage     Hypertension     History of gastrointestinal disease     Pulmonary hypertension (H)     Right rib fracture     Disorientation     Falls frequently     Closed fracture of multiple ribs of right side with routine healing     Advance care planning     Pneumonia of left lower lobe due to infectious organism     General weakness     Pneumonia     Acute lower urinary tract infection     Renal insufficiency     Acute transmural inferior wall MI (H)      Documentation of Face to Face and Certification for Home Health Services    I certify that patientLouise is under my care and that I, or a Nurse Practitioner or Physician's Assistant working with me, had a face-to-face encounter that meets the physician face-to-face encounter requirements with this patient on: 6/20/2017.    This encounter with the patient was in whole, or in part, for the following medical condition, which is the primary reason for Home Health Care: CAD.    I certify that, based on my findings, the following services are medically necessary Home Health Services: Nursing    My clinical findings support the need for the above services because: Nurse is needed: help pt and family through any needs that will arise from condition.    Further, I certify that my clinical findings support that this patient is homebound (i.e. absences from home  "require considerable and taxing effort and are for medical reasons or [employer] services or infrequently or of short duration when for other reasons) because: Mental health symptoms including: Mental health condition is exacerbated by exposure to crowds, unfamiliar environment or new stressful situations..    Based on the above findings, I certify that this patient is confined to the home and needs intermittent skilled nursing care, physical therapy and/or speech therapy.  The patient is under my care, and I have initiated the establishment of the plan of care.  This patient will be followed by a physician who will periodically review the plan of care.    Physician/Provider to provide follow up care: Boston Cagle    Responsible [facility] certified Physician at time of discharge: ismael    Please be aware that coverage of these services is subject to the terms and limitations of your health insurance plan.  Call member services at your health plan with any benefit or coverage questions.             Statement of Approval     Ordered          06/20/17 0734  I have reviewed and agree with all the recommendations and orders detailed in this document.  EFFECTIVE NOW     Approved and electronically signed by:  Boston Cagle DO                                                 INTERAGENCY TRANSFER FORM - NURSING   6/17/2017                       [facility]: [phone]            Attending Provider: Boston Cagle DO     Allergies:  Sulfasalazine, Cimetidine, Codeine Sulfate, Diflucan, Iodine I 131 Tositumomab, Morphine Sulfate, No Clinical Screening - See Comments, Tagamet, Penicillins    Infection:  None   Service:  FAMILY MEDIC    Ht:  1.626 m (5' 4\")   Wt:  85.5 kg (188 lb 7.9 oz)   Admission Wt:  89.8 kg (198 lb)    BMI:  32.35 kg/m 2   BSA:  1.96 m 2            Advance Directives        Does patient have a scanned Advance Directive/ACP document in EPIC?           No        Immunizations  "    Name Date      TDAP Vaccine (Adacel) 08/23/16       ASSESSMENT     Discharge Profile Flowsheet     EXPECTED DISCHARGE     GI WDL  WDL 06/19/17 0941    Expected Discharge Date  06/19/17 06/17/17 1028   All Quadrants Bowel Sounds  audible and normoactive 06/19/17 0941    DISCHARGE NEEDS ASSESSMENT     Last Bowel Movement  06/16/17 06/17/17 0138    Concerns To Be Addressed  -- (back home to assisted living Vs SNF) 06/03/17 1443   Passing flatus  yes 06/19/17 0405    Patient/family verbalizes understanding of discharge plan recommendations?  Yes 06/17/17 1028   COMMUNICATION ASSESSMENT      Medical Team notified of plan?  yes 06/17/17 1028   Patient's communication style  spoken language (English or Bilingual) 06/17/17 0238    Equipment Currently Used at Home  wheelchair 06/17/17 1030   FINAL RESOURCES      Transportation Available  family or friend will provide;van, wheelchair accessible 06/17/17 1030   Resources List  Hospice;Skilled Nursing Facility 06/17/17 1112    Key Recommendations  F/U with PCP 06/03/17 1443   Other Resources  -- (n/a) 06/17/17 1030    Does Patient Need a Referral for Clinic CC  No 06/03/17 1443   PAS Number  [id number] 09/21/16 1451    Equipment Used at Home  bath bench 09/27/13 1342   SKIN      FUNCTIONAL LEVEL CURRENT     Inspection  Partial (identify areas NOT inspected) 06/19/17 0941    Ambulation  3-->assistive equipment and person 06/19/17 0941   Skin areas NOT inspected  Buttock, left;Buttock, right;Sacrum;Coccyx 06/19/17 0941    Transferring  3-->assistive equipment and person 06/19/17 0941   Skin WDL  ex 06/19/17 0941    Toileting  3-->assistive equipment and person 06/19/17 0941   Skin Temperature  warm 06/19/17 0941    Bathing  3-->assistive equipment and person 06/19/17 0941   Skin Moisture  dry 06/19/17 0941    Dressing  2-->assistive person 06/19/17 0941   Skin Elasticity  quick return to original state 06/19/17 0941    Eating  0-->independent 06/19/17 0941   Skin Integrity   "rash(es) (redness groin fold and under breasts, under abdominal fold) 06/19/17 0941    Communication  0-->understands/communicates without difficulty 06/19/17 0941   SAFETY      Swallowing  0-->swallows foods/liquids without difficulty 06/19/17 0941   Safety WDL  WDL 06/20/17 0045    Change in Functional Status Since Onset of Current Illness/Injury  yes 07/14/16 1257   Safety Equipment  oxygen flowmeter 06/20/17 0045    GASTROINTESTINAL (ADULT,PEDIATRIC,OB)                        Assessment WDL (Within Defined Limits) Definitions           Safety WDL     Effective: 09/28/15    Row Information: <b>WDL Definition:</b> Bed in low position, wheels locked; call light in reach; upper side rails up x 2; ID band on<br> <font color=\"gray\"><i>Item=AS safety wdl>>List=AS safety wdl>>Version=F14</i></font>      Skin WDL     Effective: 09/28/15    Row Information: <b>WDL Definition:</b> Warm; dry; intact; elastic; without discoloration; pressure points without redness<br> <font color=\"gray\"><i>Item=AS skin wdl>>List=AS skin wdl>>Version=F14</i></font>      Vitals     Vital Signs Flowsheet     VITAL SIGNS     Pain Rating: FLACC (Activity) - Legs  0 06/19/17 0932    Temp  97.9  F (36.6  C) 06/19/17 0815   Pain Rating: FLACC (Activity) - Activity  0 06/19/17 0932    Temp src  Tympanic 06/19/17 0815   Pain Rating: FLACC (Activity) - Cry  0 06/19/17 0932    Resp  18 06/20/17 0627   Pain Rating: FLACC (Activity) - Consolability  0 06/19/17 0932    Pulse  78 06/18/17 0931   Score: FLACC (activity)  0 06/19/17 0932    Heart Rate  93 06/20/17 0518   ANALGESIA SIDE EFFECTS MONITORING      Pulse/Heart Rate Source  Monitor 06/20/17 0518   Side Effects Monitoring: Respiratory Quality  R 06/17/17 1944    BP  126/60 06/19/17 0815   Side Effects Monitoring: Respiratory Depth  N 06/17/17 1944    BP Location  Left arm 06/19/17 1035   Side Effects Monitoring: Sedation Level  1 06/17/17 1944    OXYGEN THERAPY     HEIGHT AND WEIGHT      SpO2  96 % " 06/20/17 0518   Weight  85.5 kg (188 lb 7.9 oz) 06/17/17 0304    O2 Device  Nasal cannula 06/20/17 0518   POSITIONING      Oxygen Delivery  2 LPM 06/20/17 0518   Body Position  right, side-lying 06/20/17 0836    PAIN/COMFORT     Head of Bed (HOB)  HOB at 20-30 degrees 06/18/17 2134    Patient Currently in Pain  sleeping: patient not able to self report 06/20/17 0627   Chair  Upright in chair 06/20/17 0753    Preferred Pain Scale  word (verbal rating pain scale) 06/20/17 0043   DAILY CARE      Patient's Stated Pain Goal  No pain 06/19/17 2312   Activity Type  up to commode;up in chair 06/20/17 0753    0-10 Pain Scale  8 06/20/17 0009   Activity Level of Assistance  assistance, 1 person 06/20/17 0836    Word Pain Scale  8 06/20/17 0043   Activity Assistive Device  gait belt 06/20/17 0836    Pain Location  Head 06/20/17 0043   EKG MONITORING      Pain Orientation  Right;Left 06/19/17 2312   Cardiac Regularity  Regular 06/17/17 0110    Pain Descriptors  Aching 06/20/17 0043   Cardiac Rhythm  NSR 06/17/17 0110    Pain Intervention(s)  Medication (See eMAR) 06/20/17 0043   ROLLY COMA SCALE      Response to Interventions  Relief 06/20/17 0103   Best Eye Response  4-->(E4) spontaneous 06/19/17 0934    PAIN ASSESSMENT/FLACC     Best Motor Response  6-->(M6) obeys commands 06/19/17 0934    Pain Rating: FLACC (rest) - Face  0 06/19/17 0932   Best Verbal Response  4-->(V4) confused 06/19/17 0934    Pain Rating: FLACC (rest) - Legs  0 06/19/17 0932   Rolly Coma Scale Score  14 06/19/17 0934    Pain Rating: FLACC (rest) - Activity  0 06/19/17 0932   ECG      Pain Rating: FLACC (rest) - Cry  0 06/19/17 0932   ECG Rhythm  Sinus rhythm;Other (Comment) (ST elevation) 06/18/17 0807    Pain Rating: FLACC (rest) - Consolability  0 06/19/17 0932   Ectopy  PVC 06/18/17 0442    Score: FLACC (rest)  0 06/19/17 0932   Ectopy Frequency  Occasional 06/18/17 0442    Pain Rating: FLACC (Activity) - Face  0 06/19/17 0932   Lead Monitored   Lead II 06/18/17 0807            Patient Lines/Drains/Airways Status    Active LINES/DRAINS/AIRWAYS     Name: Placement date: Placement time: Site: Days: Last dressing change:    Peripheral IV 06/17/17 Right Hand 06/17/17   0004   Hand   3     Rash 09/28/13 0211 Bilateral midline groin 09/28/13   0211    1361             Patient Lines/Drains/Airways Status    Active PICC/CVC     None            Intake/Output Detail Report     Date Intake     Output Net    Shift P.O. I.V. IV Piggyback Total Urine Total       Noc 06/18/17 2300 - 06/19/17 0659 120 -- -- 120 400 400 -280    Day 06/19/17 0700 - 06/19/17 1459 100 -- -- 100 300 300 -200    Suzanne 06/19/17 1500 - 06/19/17 2259 180 -- -- 180 1150 1150 -970    Noc 06/19/17 2300 - 06/20/17 0659 80 -- -- 80 100 100 -20    Day 06/20/17 0700 - 06/20/17 1459 -- -- -- -- 5 5 -5      Last Void/BM       Most Recent Value    Urine Occurrence 1 at 06/20/2017 0835    Stool Occurrence       Case Management/Discharge Planning     Case Management/Discharge Planning Flowsheet     REFERRAL INFORMATION     Concerns To Be Addressed  -- (back home to assisted living Vs SNF) 06/03/17 1443    Did the Initial Social Work Assessment result in a Social Work Case?  Yes 06/17/17 1028   DISCHARGE PLANNING      Admission Type  inpatient 06/17/17 1028   Patient/family verbalizes understanding of discharge plan recommendations?  Yes 06/17/17 1028    Arrived From  skilled nursing facility 06/17/17 1028   Medical Team notified of plan?  yes 06/17/17 1028    Reason For Consult  discharge planning 06/17/17 1028   Transportation Available  family or friend will provide;van, wheelchair accessible 06/17/17 1030    Primary Care Clinic Name  [facility] 06/17/17 1028   Key Recommendations  F/U with PCP 06/03/17 1443    Primary Care MD Name  Dr Cagle 06/17/17 1028   Does Patient Need a Referral for Clinic CC  No 06/03/17 1443    LIVING ENVIRONMENT     Equipment Used at Home  bath bench 09/27/13 1342    Lives With  other  (see comments) 06/17/17 1028   FINAL NOTE      Living Arrangements  residential facility 06/17/17 1028   Final Note  see care plan 06/17/17 1030    Provides Primary Care For  no one, unable/limited ability to care for self 06/17/17 1028   FINAL RESOURCES      Able to Return to Prior Living Arrangements  no 06/17/17 1112   Equipment Currently Used at Home  wheelchair 06/17/17 1030    HOME SAFETY     Resources List  Hospice;Skilled Nursing Facility 06/17/17 1112    Patient Feels Safe Living in Home?  yes 06/17/17 1028   Other Resources  -- (n/a) 06/17/17 1030    ASSESSMENT OF FAMILY/SOCIAL SUPPORT     [facility] Number  [id number] 09/21/16 1451    Marital Status   06/17/17 1028   ABUSE RISK SCREEN      Who is your support system?  Children;Facility resident(s)/Staff 06/17/17 1028   QUESTION TO PATIENT:  Has a member of your family or a partner(now or in the past) intimidated, hurt, manipulated, or controlled you in any way?  no 06/17/17 0238    Description of Support System  Supportive;Involved 06/17/17 1028   QUESTION TO PATIENT: Do you feel safe going back to the place where you are living?  yes 06/17/17 0238    Support Assessment  Adequate family and caregiver support;Uses health system/providers for social contact 06/17/17 1028   OBSERVATION: Is there reason to believe there has been maltreatment of a vulnerable adult (ie. Physical/Sexual/Emotional abuse, self neglect, lack of adequate food, shelter, medical care, or financial exploitation)?  no 06/17/17 0238    COPING/STRESS     (R) MENTAL HEALTH SUICIDE RISK      Major Change/Loss/Stressor  none 06/17/17 1315   Are you depressed or being treated for depression?  No 06/17/17 0238    EXPECTED DISCHARGE     HOMICIDE RISK      Expected Discharge Date  06/19/17 06/17/17 1028   Homicidal Ideation  no 06/17/17 0238    ASSESSMENT/CONCERNS TO BE ADDRESSED                         [facility]: [phone]            Medication Administration Report for Louise Johnson  D as of 06/20/17 0911   Legend:    Given Hold Not Given Due Canceled Entry Other Actions    Time Time (Time) Time  Time-Action       Inactive    Active    Linked        Medications 06/14/17 06/15/17 06/16/17 06/17/17 06/18/17 06/19/17 06/20/17    acetaminophen (TYLENOL) Suppository 650 mg  Dose: 650 mg Freq: EVERY 6 HOURS PRN Route: RE  PRN Reasons: mild pain,fever  PRN Comment: temperature over 100  F   Start: 06/18/17 1751   Admin Instructions: Maximum acetaminophen dose from all sources = 75 mg/kg/day not to exceed 4 grams/day.               acetaminophen (TYLENOL) tablet 650 mg  Dose: 650 mg Freq: EVERY 4 HOURS PRN Route: PO  PRN Reason: mild pain  Start: 06/17/17 0253   Admin Instructions: Maximum acetaminophen dose from all sources = 75 mg/kg/day not to exceed 4 grams/day.               alum & mag hydroxide-simethicone (MYLANTA ES/MAALOX  ES) suspension 15-30 mL  Dose: 15-30 mL Freq: EVERY 4 HOURS PRN Route: PO  PRN Reason: indigestion  Start: 06/17/17 0253   Admin Instructions: Shake well.               artificial saliva (BHAVYA-STOR) solution 1 spray  Dose: 1 spray Freq: EVERY 1 HOUR PRN Route: MT  PRN Reason: dry mouth  Start: 06/18/17 1753              aspirin chewable tablet 81 mg  Dose: 81 mg Freq: DAILY Route: PO  Indications Comment: ecotrim  Start: 06/19/17 0900         1049 (81 mg)-Given        [ ] 0900           atropine 1 % ophthalmic solution 1-2 drop  Dose: 1-2 drop Freq: EVERY 1 HOUR PRN Route: SL  PRN Reason: other  PRN Comment: secretions  Start: 06/18/17 1753              benzocaine (ORAJEL MAXIMUM STRENGTH) 20 % gel  Freq: 4 TIMES DAILY PRN Route: MT  PRN Reason: moderate pain  Start: 06/17/17 1149              carvedilol (COREG) tablet 6.25 mg  Dose: 6.25 mg Freq: 2 TIMES DAILY WITH MEALS Route: PO  Start: 06/19/17 0845         1051 (6.25 mg)-Given       1859 (6.25 mg)-Given        [ ] 0800       [ ] 1700           cholecalciferol (vitamin D) tablet 1,000 Units  Dose: 1,000 Units Freq: DAILY  Route: PO  Start: 06/19/17 0900         1052 (1,000 Units)-Given        [ ] 0900           fluticasone-vilanterol (BREO ELLIPTA) 100-25 MCG/INH oral inhaler 1 puff  Dose: 1 puff Freq: DAILY Route: IN  Start: 06/19/17 0900   Admin Instructions: *Do not use more frequently than once daily.*  Rinse mouth after use.  Formulary auto-substitution for Advair Diskus          [ ] 0900        [ ] 0900           furosemide (LASIX) tablet 40 mg  Dose: 40 mg Freq: DAILY Route: PO  Start: 06/19/17 0900         1051 (40 mg)-Given        [ ] 0900           glucose 40 % gel 15-30 g  Dose: 15-30 g Freq: EVERY 15 MIN PRN Route: PO  PRN Reason: low blood sugar  Start: 06/17/17 0253   Admin Instructions: Give 15 g for BG 51 to 69 mg/dL IF patient is conscious and able to swallow. Give 30 g for BG less than or equal to 50 mg/dL IF patient is conscious and able to swallow. Do NOT give glucose gel via enteral tube.  IF patient has enteral tube: give apple juice 120 mL (4 oz or 15 g of CHO) via enteral tube for BG 51 to 69 mg/dL.  Give apple juice 240 mL (8 oz or 30 g of CHO) via enteral tube for BG less than or equal to 50 mg/dL.              Or  dextrose 50 % injection 25-50 mL  Dose: 25-50 mL Freq: EVERY 15 MIN PRN Route: IV  PRN Reason: low blood sugar  Start: 06/17/17 0253   Admin Instructions: Use if have IV access, BG less than 70 mg/dL and meet dose criteria below:  Dose if conscious and alert (or disorientated) and NPO = 25 mL  Dose if unconscious / not alert = 50 mL  Vesicant.              Or  glucagon injection 1 mg  Dose: 1 mg Freq: EVERY 15 MIN PRN Route: SC  PRN Reason: low blood sugar  PRN Comment: May repeat x 1 only  Start: 06/17/17 0253   Admin Instructions: May give SQ or IM. IF BG less than or equal to 50 mg/dL and no IV access.  ONLY use glucagon IF patient has NO IV access AND is UNABLE to swallow.               haloperidol lactate (HALDOL) injection 0.5-1 mg  Dose: 0.5-1 mg Freq: EVERY 1 HOUR PRN Route: IV  PRN  "Reason: agitation  Start: 06/18/17 1753   Admin Instructions: Call provider if agitation not relieved at these doses.  Contraindicated in Parkinsonism.               HYDROmorphone (PF) (DILAUDID) injection 0.2-0.4 mg  Dose: 0.2-0.4 mg Freq: EVERY 1 HOUR PRN Route: IV  PRN Reason: moderate to severe pain  PRN Comment: or dyspnea  Start: 06/18/17 1750        1850 (0.2 mg)-Given        2242 (0.4 mg)-Given            hypromellose-dextran (ARTIFICAL TEARS) ophthalmic solution 1 drop  Dose: 1 drop Freq: DAILY PRN Route: Both Eyes  PRN Reason: dry eyes  Start: 06/19/17 0854   Admin Instructions: Substituted for Refresh plus 0.5% opth soln               hypromellose-dextran (ARTIFICAL TEARS) ophthalmic solution 1 drop  Dose: 1 drop Freq: 3 TIMES DAILY PRN Route: Both Eyes  PRN Reason: dry eyes  Start: 06/17/17 1115   Admin Instructions: Formulary alternate for  Cellvisc/Refresh drops               insulin detemir (LEVEMIR) injection 7 Units  Dose: 7 Units Freq: AT BEDTIME Route: SC  Start: 06/19/17 2200         (2125)-Not Given        [ ] 2200           lidocaine (LMX4) kit  Freq: ONCE PRN Route: Top  PRN Reason: mild pain  PRN Comment: with VAD insertion or accessing implanted port,  Start: 06/18/17 1750   Admin Instructions: Do NOT give if patient has a history of allergy to any local anesthetic or any \"paul\" product.   Apply 30 min prior to VAD insertion or port access.  MAX Dose:  2.5 gm (  of 5 gm tube)                 lidocaine 1 % 1 mL  Dose: 1 mL Freq: ONCE PRN Route: OTHER  PRN Comment: mild pain with VAD insertion or accessing implanted port,  Start: 06/18/17 1750   Admin Instructions: Do NOT give if patient has a history of allergy to any local anesthetic or any \"paul\" product. MAX dose 1 mL subcutaneous OR intradermal in divided doses.               lisinopril (PRINIVIL/ZESTRIL) tablet 40 mg  Dose: 40 mg Freq: DAILY Route: PO  Start: 06/19/17 0900         1049 (40 mg)-Given        [ ] 0900           LORazepam " (ATIVAN) injection 0.5-1 mg  Dose: 0.5-1 mg Freq: EVERY 3 HOURS PRN Route: IV  PRN Reasons: anxiety,seizures,nausea  PRN Comment: dyspnea  Start: 06/18/17 1751   Admin Instructions: Avoid if delirium present.  Use fourth line for nausea.  For IV PUSH: Dilute with equal volume of NS.                     Or  LORazepam (ATIVAN) tablet 0.5-1 mg  Dose: 0.5-1 mg Freq: EVERY 3 HOURS PRN Route: PO  PRN Reasons: anxiety,seizures,nausea  PRN Comment: dyspnea  Start: 06/18/17 1751   Admin Instructions: Avoid if delirium present.  Use fourth line for nausea.         2115 (0.5 mg)-Given            Or  LORazepam (ATIVAN) tablet 0.5-1 mg  Dose: 0.5-1 mg Freq: EVERY 3 HOURS PRN Route: SL  PRN Reasons: anxiety,seizures,nausea  PRN Comment: dyspnea  Start: 06/18/17 1751   Admin Instructions: Avoid if delirium present.  Use fourth line for nausea.                      magnesium hydroxide (MILK OF MAGNESIA) suspension 30 mL  Dose: 30 mL Freq: DAILY PRN Route: PO  PRN Reason: constipation  Start: 06/17/17 0253   Admin Instructions: Hold for loose stools.  This is the second step of a three step constipation treatment protocol.                 mineral oil-hydrophilic petrolatum (AQUAPHOR)  Freq: EVERY 8 HOURS PRN Route: Top  PRN Reason: dry skin  Start: 06/18/17 1753   Admin Instructions: Apply to lips as directed, for dryness.   Offer every shift.               naloxone (NARCAN) injection 0.1-0.4 mg  Dose: 0.1-0.4 mg Freq: EVERY 2 MIN PRN Route: IV  PRN Reason: opioid reversal  Start: 06/18/17 1751   Admin Instructions: Try to minimize reversal of analgesia especially in end-of-life patients.               nitroglycerin (NITROSTAT) sublingual tablet 0.4 mg  Dose: 0.4 mg Freq: EVERY 5 MIN PRN Route: SL  PRN Reason: chest pain  Start: 06/17/17 0334       1629 (0.4 mg)-Given       1634 (0.4 mg)-Given       1642 (0.4 mg)-Given              ondansetron (ZOFRAN-ODT) ODT tab 4 mg  Dose: 4 mg Freq: EVERY 6 HOURS PRN Route: PO  PRN Reason:  nausea  Start: 06/18/17 1753   Admin Instructions: This is Step 1 of nausea and vomiting management.  If nausea not resolved in 15 minutes, go to Step 2 prochlorperazine (COMPAZINE). Do not push through foil backing. Peel back foil and gently remove. Place on tongue immediately. Administration with liquid unnecessary              Or  ondansetron (ZOFRAN) injection 4 mg  Dose: 4 mg Freq: EVERY 6 HOURS PRN Route: IV  PRN Reasons: nausea,vomiting  Start: 06/18/17 1753   Admin Instructions: This is Step 1 of nausea and vomiting management.  If nausea not resolved in 15 minutes, go to Step 2 prochlorperazine (COMPAZINE).  Irritant.               Patient is already receiving anticoagulation with heparin, enoxaparin (LOVENOX), warfarin (COUMADIN)  or other anticoagulant medication  Freq: CONTINUOUS PRN Route: XX  Start: 06/17/17 0253              prochlorperazine (COMPAZINE) injection 5 mg  Dose: 5 mg Freq: EVERY 6 HOURS PRN Route: IV  PRN Reasons: nausea,vomiting  Start: 06/17/17 0253   Admin Instructions: This is Step 2 of nausea and vomiting management.   If nausea not resolved in 15 minutes, give metoclopramide (REGLAN) if ordered (step 3 of nausea and vomiting management)              Or  prochlorperazine (COMPAZINE) tablet 5 mg  Dose: 5 mg Freq: EVERY 6 HOURS PRN Route: PO  PRN Reason: vomiting  Start: 06/17/17 0253   Admin Instructions: This is Step 2 of nausea and vomiting management.   If nausea not resolved in 15 minutes, give metoclopramide (REGLAN) if ordered (step 3 of nausea and vomiting management)              Or  prochlorperazine (COMPAZINE) Suppository 12.5 mg  Dose: 12.5 mg Freq: EVERY 12 HOURS PRN Route: RE  PRN Reasons: nausea,vomiting  Start: 06/17/17 0253   Admin Instructions: This is Step 2 of nausea and vomiting management.   If nausea not resolved in 15 minutes, give metoclopramide (REGLAN) if ordered (step 3 of nausea and vomiting management)               simvastatin (ZOCOR) tablet 20 mg  Dose:  20 mg Freq: AT BEDTIME Route: PO  Start: 06/19/17 2200         (2125)-Not Given        [ ] 2200           sodium chloride (PF) 0.9% PF flush 3 mL  Dose: 3 mL Freq: EVERY 8 HOURS Route: IV  Start: 06/18/17 1800   Admin Instructions: And Q1H PRN, to lock peripheral IV dormant line.           1853 (3 mL)-Given        (0335)-Not Given       1055 (3 mL)-Given       1056 (3 mL)-Given       1859 (3 mL)-Given        0757 (3 mL)-Given       [ ] 1000       [ ] 1800           sodium chloride (PF) 0.9% PF flush 3 mL  Dose: 3 mL Freq: EVERY 1 HOUR PRN Route: IV  PRN Reasons: line flush,post meds or blood draw  Start: 06/18/17 1750   Admin Instructions: for peripheral IV flush post IV meds          2243 (3 mL)-Given            sodium chloride (PF) 0.9% PF flush 3 mL  Dose: 3 mL Freq: EVERY 8 HOURS Route: IK  Start: 06/17/17 0300   Admin Instructions: And Q1H PRN, to lock peripheral IV dormant line.        0309 (3 mL)-Given       (1139)-Not Given [C]               (0203)-Not Given       1518 (3 mL)-Given               0335 (3 mL)-Given       1056 (3 mL)-Given       (1900)-Not Given [C]               [ ] 1100       [ ] 1900           sodium phosphate (FLEET ENEMA) 1 enema  Dose: 1 enema Freq: ONCE PRN Route: RE  PRN Reason: constipation  Start: 06/17/17 0253   Admin Instructions: Use caution in renal failure, CHF, electrolyte disturbances, or age greater than 55 due to increased risk of acute phosphate nephropathy.  Hold for loose stools.  This is the third step of a three step constipation treatment protocol.               traMADol (ULTRAM) tablet 50 mg  Dose: 50 mg Freq: EVERY 6 HOURS PRN Route: PO  PRN Reason: moderate to severe pain  Start: 06/17/17 0334        2115 (50 mg)-Given         0009 (50 mg)-Given       0756 (50 mg)-Given          Future Medications  Medications 06/14/17 06/15/17 06/16/17 06/17/17 06/18/17 06/19/17 06/20/17       bisacodyl (DULCOLAX) Suppository 10 mg  Dose: 10 mg Freq: ONCE PRN Route: RE  PRN Reason:  other  PRN Comment: for no bowel movement for 72 hours  Start: 06/21/17 0000   Admin Instructions: Give only after rectal check for impacted stool.              Completed Medications  Medications 06/14/17 06/15/17 06/16/17 06/17/17 06/18/17 06/19/17 06/20/17         Dose: 500 mL Freq: ONCE Route: IV  Last Dose: 500 mL (06/17/17 1954)  Start: 06/17/17 1715   End: 06/17/17 2024       (1826)-Not Given [C]       1954 (500 mL)-New Bag             Discontinued Medications  Medications 06/14/17 06/15/17 06/16/17 06/17/17 06/18/17 06/19/17 06/20/17         Rate: 10 mL/hr Freq: CONTINUOUS Route: IV  Last Dose: Stopped (06/17/17 0302)  Start: 06/17/17 0300   End: 06/18/17 1129       0302-ED Infusing on Admission/transfer        1129-Med Discontinued           Dose: 650 mg Freq: EVERY 4 HOURS PRN Route: RE  PRN Reason: mild pain  Start: 06/17/17 0253   End: 06/18/17 1757   Admin Instructions: Not to exceed 4 grams/day.  Maximum acetaminophen dose from all sources = 75 mg/kg/day not to exceed 4 grams/day.         1757-Med Discontinued           Dose: 650 mg Freq: EVERY 6 HOURS PRN Route: PO  PRN Reasons: mild pain,fever  PRN Comment: temperature over 100  F   Start: 06/18/17 1751   End: 06/18/17 1757   Admin Instructions: Maximum acetaminophen dose from all sources = 75 mg/kg/day not to exceed 4 grams/day.         1757-Med Discontinued           Dose: 325 mg Freq: DAILY Route: PO  Start: 06/18/17 0900   End: 06/18/17 1129   Admin Instructions: DO NOT CRUSH.         1129-Med Discontinued                 Dose: 81 mg Freq: DAILY Route: PO  Start: 06/17/17 0900   End: 06/17/17 1152   Admin Instructions: DO NOT CRUSH.        1025 (81 mg)-Given       1152-Med Discontinued            Dose: 80 mg Freq: DAILY WITH SUPPER Route: PO  Start: 06/17/17 1730   End: 06/18/17 1129       1824 (80 mg)-Given [C]        1129-Med Discontinued           Dose: 5-15 mg Freq: DAILY PRN Route: PO  PRN Reason: constipation  PRN Comment:    Start: 06/17/17  0253   End: 06/18/17 1757   Admin Instructions: Do not crush or chew. Swallow whole. May titrate 1 tablet each day until response. Maximum of 3 tablets daily. Hold for loose stools.  This is the first step of a three step constipation treatment protocol.         1757-Med Discontinued           Dose: 1 drop Freq: 3 TIMES DAILY PRN Route: Both Eyes  PRN Reason: dry eyes  Start: 06/17/17 1047   End: 06/17/17 1104       1104-Med Discontinued            Dose: 1 drop Freq: DAILY PRN Route: Both Eyes  PRN Reason: dry eyes  Start: 06/19/17 0842   End: 06/19/17 0853         0853-Med Discontinued          Dose: 6.25 mg Freq: 2 TIMES DAILY WITH MEALS Route: PO  Start: 06/17/17 0800   End: 06/18/17 1057       1026 (6.25 mg)-Given       1823 (6.25 mg)-Given        1057-Med Discontinued  (1109)-Not Given [C]               Dose: 1,000 Units Freq: DAILY Route: PO  Start: 06/17/17 0900   End: 06/18/17 1129       1025 (1,000 Units)-Given        1129-Med Discontinued                 Dose: 75 mg Freq: DAILY Route: PO  Start: 06/17/17 2100   End: 06/18/17 1129       2112 (75 mg)-Given        1129-Med Discontinued                 Freq: DOES NOT GO TO MAR Route: XX  PRN Reason: other  Start: 06/17/17 0253   End: 06/18/17 1757        1757-Med Discontinued           Freq: DOES NOT GO TO MAR Route: XX  PRN Reason: other  Start: 06/17/17 0253   End: 06/18/17 1757   Admin Instructions: Continuing beta blocker from home medication list OR beta blocker order already placed during this visit         1757-Med Discontinued           Freq: DOES NOT GO TO MAR Route: XX  PRN Reason: other  Start: 06/17/17 0253   End: 06/18/17 1757   Admin Instructions: Continuing statin from home medication list OR statin order already placed during this visit         1757-Med Discontinued           Dose: 1.25 mg Freq: EVERY 6 HOURS Route: IV  Start: 06/18/17 1100   End: 06/18/17 1129   Admin Instructions: Hold prn systolic blood pressure < 100         1129-Med  Discontinued                 Dose: 1 puff Freq: DAILY Route: IN  Start: 06/17/17 0900   End: 06/18/17 1129   Admin Instructions: *Do not use more frequently than once daily.*  Rinse mouth after use.<br>Formulary auto-substitution for Advair Diskus        0938 (1 puff)-Given        1129-Med Discontinued                 Dose: 10 mg Freq: ONCE Route: IV  Start: 06/18/17 1100   End: 06/18/17 1129        1129-Med Discontinued                 Dose: 20 mg Freq: DAILY Route: PO  Start: 06/17/17 1100   End: 06/18/17 1057       1200 (20 mg)-Given        (0900)-Not Given [C]       1057-Med Discontinued           Dose: 400 mg Freq: 2 TIMES DAILY Route: PO  Start: 06/17/17 0900   End: 06/18/17 1129       1025 (400 mg)-Given       2112 (400 mg)-Given        1129-Med Discontinued                 Rate: 0-35 mL/hr Freq: CONTINUOUS Route: IV  Last Dose: Stopped (06/18/17 1100)  Start: 06/17/17 0300   End: 06/18/17 1129   Admin Instructions: Starting Infusion Rate = 1,100 Units/hr (actual weight) (12 units/kg/hr).  Low Intensity Heparin Treatment.  GOAL: Heparin Xa (10a) LEVEL = 0.15-0.35 (PTT = 45-65 seconds).  Max 1000 units/hr if patient getting TNK and greater than 70 kg.  Beginning with the Heparin Xa (10a) Level collected 6 hours after starting heparin, adjust heparin according to guidelines.    Release Heparin Xa (10a) Level order for blood draw 6 hours after start of infusion and 6 hours after any dose change.    If Xa (10a) less than 0.1 see bolus orders and INCREASE by 300 units/hr     If Xa (10a) 0.1 - 0.14 see bolus orders and INCREASE by 150 units/hr     If Xa (10a) 0.15 - 0.35 NO CHANGE in rate    if Xa (10a) 0.36 - 0.48 then REDUCE by 150 units/hr     If Xa (10a) 0.49 - 0.66 then HOLD 30 mins and REDUCE by 200 units/hr     If Xa (10a) 0.67 - 1.31 then HOLD 60 mins and REDUCE by 300 units/hr     If Xa (10a) greater than 1.31 then HOLD 60 mins and REDUCE by 350 units/hr        0325-ED Infusing on Admission/transfer        1642-Hold [C]       1745 (950 Units/hr)-Started        0120-Hold [C]       0224 (650 Units/hr)-New Bag       0928 (500 Units/hr)-Rate/Dose Change       1100-Stopped       1129-Med Discontinued           Freq: EVERY 6 HOURS PRN Route: IV  PRN Comment: to reach target Heparin Xa (10a) goal. GOAL: Heparin Xa (10a) LEVEL = 0.15-0.35 (PTT = 45-65 seconds).  Start: 06/17/17 0259   End: 06/18/17 1129   Admin Instructions: Beginning with the Heparin Xa (10a) level collected 6 hours AFTER starting heparin:  If Heparin Xa (10a) less than 0.1, then bolus dose = 4,500 Units (actual weight) (50 Units/kg x 89.8 kg (actual weight))    If Heparin Xa (10a) 0.1 to 0.14, then bolus dose = 2,700 Units (actual weight) (30 Units/kg x 89.8 kg (actual weight))  Nurse to administer dose from existing infusion. If no infusion bag or syringe for this order is available, contact pharmacist to re-enter medication order.        1024 (4,500 Units)-Given [C]        1129-Med Discontinued           Freq: HOLD Route: XX  Start: 06/17/17 0253   End: 06/18/17 1129   Admin Instructions: IF patient has received sildenafil (VIAGRA/REVATIO) within the last 8 hours, avanafil (STENDRA) within the last 8 hours, vardenafil (LEVITRA/STAXYN) within the last 18 hours, or tadalafil (CIALIS/ADCIRCA) within the last 36 hours.  Inform provider if patient has taken one of those medications.         1129-Med Discontinued           Dose: 0.2 mg Freq: EVERY 2 HOURS PRN Route: IV  PRN Reason: severe pain  Start: 06/17/17 0253   End: 06/18/17 1757   Admin Instructions: Hold while on PCA.        1933 (0.2 mg)-Given       2143 (0.2 mg)-Given        0657 (0.2 mg)-Given       1123 (0.2 mg)-Given       1338 (0.2 mg)-Given       1559 (0.2 mg)-Given       1757-Med Discontinued           Dose: 1 drop Freq: DAILY PRN Route: Both Eyes  PRN Reason: dry eyes  Start: 06/17/17 0817   End: 06/17/17 1104       1026 (1 drop)-Given       1104-Med Discontinued            Dose: 1-2 drop  Freq: EVERY 8 HOURS PRN Route: Both Eyes  PRN Reason: dry eyes  Start: 06/18/17 1753   End: 06/18/17 1757   Admin Instructions: Offer every shift.         1757-Med Discontinued           Dose: 1-5 Units Freq: AT BEDTIME Route: SC  Start: 06/17/17 0300   End: 06/18/17 1129   Admin Instructions: MEDIUM INSULIN RESISTANCE DOSING    Do Not give Bedtime Correction Insulin if BG less than  200.   For  - 249 give 1 units.   For  - 299 give 2 units.   For  - 349 give 3 units.   For  -399 give 4 units.   For BG greater than or equal to 400 give 5 units.  Notify provider if glucose greater than or equal to 350 mg/dL after administration of correction dose.  If given at mealtime, must be administered 5 min before meal or immediately after.        0323 (2 Units)-Given       2114 (1 Units)-Given        1129-Med Discontinued           Dose: 1-7 Units Freq: 3 TIMES DAILY BEFORE MEALS Route: SC  Start: 06/17/17 0730   End: 06/18/17 1129   Admin Instructions: Correction Scale - MEDIUM INSULIN RESISTANCE DOSING     Do Not give Correction Insulin if Pre-Meal BG less than 140.   For Pre-Meal  - 189 give 1 unit.   For Pre-Meal  - 239 give 2 units.   For Pre-Meal  - 289 give 3 units.   For Pre-Meal  - 339 give 4 units.   For Pre-Meal - 399 give 5 units.   For Pre-Meal -449 give 6 units  For Pre-Meal BG greater than or equal to 450 give 7 units.   To be given with prandial insulin, and based on pre-meal blood glucose.    Notify provider if glucose greater than or equal to 350 mg/dL after administration of correction dose.  If given at mealtime, must be administered 5 min before meal or immediately after.        0846 (2 Units)-Given       1155 (1 Units)-Given       1831 (2 Units)-Given        (0821)-Not Given [C]       1129-Med Discontinued           Dose: 7 Units Freq: AT BEDTIME Route: SC  Start: 06/17/17 2200   End: 06/18/17 1129       2115 (7 Units)-Given        1129-Med  "Discontinued           Freq: EVERY 1 HOUR PRN Route: Top  PRN Reason: pain  PRN Comment: with VAD insertion or accessing implanted port.  Start: 06/17/17 0253   End: 06/18/17 1757   Admin Instructions: Do NOT give if patient has a history of allergy to any local anesthetic or any \"paul\" product.   Apply 30 minutes prior to VAD insertion or port access.  MAX Dose:  2.5 g (  of 5 g tube)         1757-Med Discontinued           Dose: 1 mL Freq: EVERY 1 HOUR PRN Route: OTHER  PRN Comment: mild pain with VAD insertion or accessing implanted port  Start: 06/17/17 0253   End: 06/18/17 1757   Admin Instructions: Do NOT give if patient has a history of allergy to any local anesthetic or any \"paul\" product. MAX dose 1 mL subcutaneous OR intradermal in divided doses.         1757-Med Discontinued           Dose: 40 mg Freq: DAILY Route: PO  Start: 06/17/17 1100   End: 06/18/17 1057       1159 (40 mg)-Given        (0900)-Not Given [C]       1057-Med Discontinued           Dose: 2.5 mg Freq: EVERY 8 HOURS Route: IV  Start: 06/18/17 1100   End: 06/18/17 1129   Admin Instructions: Hold prn pulse < 60 or systolic bp < 100         1129-Med Discontinued                 Dose: 30 mg Freq: AT BEDTIME Route: PO  Start: 06/17/17 2200   End: 06/18/17 1129       2112 (30 mg)-Given        1129-Med Discontinued           Dose: 1 tablet Freq: DAILY Route: PO  Start: 06/17/17 1100   End: 06/18/17 1129       1200 (1 tablet)-Given        (1111)-Not Given [C]       1129-Med Discontinued           Dose: 0.1-0.4 mg Freq: EVERY 2 MIN PRN Route: IV  PRN Reason: opioid reversal  Start: 06/17/17 0253   End: 06/18/17 1757   Admin Instructions: For respiratory rate LESS than or EQUAL to 8.  Partial reversal dose:  0.1 mg titrated q 2 minutes for Analgesia Side Effects Monitoring Sedation Level of 3 (frequently drowsy, arousable, drifts to sleep during conversation).Full reversal dose:  0.4 mg bolus for Analgesia Side Effects Monitoring Sedation Level of " 4 (somnolent, minimal or no response to stimulation).         1757-Med Discontinued           Rate: 1.8-51.3 mL/hr Freq: CONTINUOUS Route: IV  Last Dose: Stopped (06/18/17 0732)  Start: 06/17/17 1700   End: 06/18/17 1129   Admin Instructions: For range orders: start at lowest dose ordered. Titrate by 0.1 mcg/kg/min every 5 minutes to keep SBP less than 180 mmHg and/or chest pain relief. Notify prescriber if higher doses are required to achieve blood pressure goals.  Vesicant.        1700 (0.07 mcg/kg/min)-New Bag       1730 (0.101 mcg/kg/min)-Rate/Dose Change       1920 (0.2 mcg/kg/min)-Rate/Dose Change       1930 (0.3 mcg/kg/min)-Rate/Dose Change       2010-Stopped        0707 (0.1 mcg/kg/min)-Restarted       0732-Stopped [C]       1129-Med Discontinued           Dose: 20 mg Freq: EVERY MORNING BEFORE BREAKFAST Route: PO  Start: 06/17/17 1730   End: 06/18/17 1057       1832 (20 mg)-Given        (0730)-Not Given [C]       1057-Med Discontinued           Dose: 4 mg Freq: EVERY 6 HOURS PRN Route: PO  PRN Reason: nausea  Start: 06/17/17 0253   End: 06/18/17 1757   Admin Instructions: This is Step 1 of nausea and vomiting management.  If nausea not resolved in 15 minutes, go to Step 2 prochlorperazine (COMPAZINE). Do not push through foil backing. Peel back foil and gently remove. Place on tongue immediately. Administration with liquid unnecessary         1757-Med Discontinued        Or    Dose: 4 mg Freq: EVERY 6 HOURS PRN Route: IV  PRN Reasons: nausea,vomiting  Start: 06/17/17 0253   End: 06/18/17 1757   Admin Instructions: This is Step 1 of nausea and vomiting management.  If nausea not resolved in 15 minutes, go to Step 2 prochlorperazine (COMPAZINE).  Irritant.         1757-Med Discontinued           Dose: 40 mg Freq: EVERY MORNING BEFORE BREAKFAST Route: IV  Start: 06/18/17 1100   End: 06/18/17 1129   Admin Instructions: Reconstitute vial with 10mLs Saline and administer IV Push  Irritant.         1129-Med  Discontinued                 Dose: 17 g Freq: EVERY OTHER DAY Route: PO  Start: 06/18/17 0900   End: 06/18/17 1129   Admin Instructions: 1 Packet = 17 grams. Mixed prescribed dose in 8 ounces of water.  1 Packet = 17 grams. Mixed prescribed dose in 8 ounces of water. Follow with 8 oz. of water.         1129-Med Discontinued                 Dose: 5 mg Freq: EVERY 6 HOURS PRN Route: IV  PRN Reasons: nausea,vomiting  Start: 06/18/17 1753   End: 06/18/17 1757   Admin Instructions: This is Step 2 of nausea and vomiting management.   If nausea not resolved in 15 minutes, give metoclopramide (REGLAN) if ordered (step 3 of nausea and vomiting management)         1757-Med Discontinued        Or    Dose: 5 mg Freq: EVERY 6 HOURS PRN Route: PO  PRN Reason: vomiting  Start: 06/18/17 1753   End: 06/18/17 1757   Admin Instructions: This is Step 2 of nausea and vomiting management.   If nausea not resolved in 15 minutes, give metoclopramide (REGLAN) if ordered (step 3 of nausea and vomiting management)         1757-Med Discontinued        Or    Dose: 12.5 mg Freq: EVERY 12 HOURS PRN Route: RE  PRN Reasons: nausea,vomiting  Start: 06/18/17 1753   End: 06/18/17 1757   Admin Instructions: This is Step 2 of nausea and vomiting management.   If nausea not resolved in 15 minutes, give metoclopramide (REGLAN) if ordered (step 3 of nausea and vomiting management)         1757-Med Discontinued           Dose: 1-2 tablet Freq: 2 TIMES DAILY Route: PO  Start: 06/17/17 0900   End: 06/18/17 1129   Admin Instructions: Start with 1 tablet PO BID, If no bowel movement in 24 hours, increase to 2 tablets PO BID.  Hold for loose stools.        1026 (1 tablet)-Given       2113 (1 tablet)-Given        1129-Med Discontinued                 Dose: 20 mg Freq: AT BEDTIME Route: PO  Start: 06/17/17 2200   End: 06/17/17 1716       1716-Med Discontinued            Dose: 3 mL Freq: EVERY 1 HOUR PRN Route: IK  PRN Reason: line flush  PRN Comment: for  peripheral IV flush post IV meds  Start: 06/17/17 0253   End: 06/18/17 1757        1757-Med Discontinued           Dose: 90 mg Freq: 2 TIMES DAILY Route: PO  Start: 06/17/17 0900   End: 06/17/17 1049       1025 (90 mg)-Given       1049-Med Discontinued       Medications 06/14/17 06/15/17 06/16/17 06/17/17 06/18/17 06/19/17 06/20/17               INTERAGENCY TRANSFER FORM - NOTES (H&P, Discharge Summary, Consults, Procedures, Therapies)   6/17/2017                       [facility]: [phone]               History & Physicals      H&P signed by Francesca Franco MD at 6/17/2017  6:05 PM      Author:  Francesca Franco MD Service:  Family Medicine Author Type:  Physician    Filed:  6/17/2017  6:05 PM Date of Service:  6/17/2017  3:33 AM Creation Time:  6/17/2017  8:44 AM    Status:  Signed :  Francesca Franco MD (Physician)         DATE OF ADMISSION:  06/17/2017       CHIEF COMPLAINT:  Chest pain.      HISTORY OF PRESENT ILLNESS:  Louise Johnson is an 88-year-old woman with known coronary artery disease, status post stents, who developed substernal chest pain 8/10 severity relieved with sublingual nitroglycerin x 1 this evening.  She also had shortness of breath with oxygen saturation of 92% treated with 2L nasal cannula oxygen.  She was given 324 mg aspirin.  She was noted to have ST elevation in the inferior leads by EMS personnel.   In the emergency room, she no longer had chest pain and oxygen saturation was stable, but she she has ongoing ST elevation in the inferior leads with reciprocal changes in the lateral leads and is admitted for acute inferior STEMI.  She received 1 dose of Brilinta 180 mg in the emergency room and was started on a heparin drip and a nitroglycerin drip.  She also was noted to have hypotension with systolic pressures in the 80s (after sublingual nitroglycerin) and received IV fluids in the emergency room.  Troponin obtained in the emergency room was 0.052.  She also notes a 3 - 4 day  history of generalized weakness.  She had low blood pressure approximately 6:00 this evening (at [facility]) with poor appetite, nausea and vomiting after taking a protein drink this evening.  Yesterday evening she had chest pain which was relieved with Maalox. She was thirsty earlier tonight. She is status post hospital admission 6/2 - 6/5/2017 for left lower lobe pneumonia and UTI.     ALLERGIES:  Include sulfasalazine, which caused anaphylaxis, cimetidine,  codeine (she is able to tolerate Percocet, Lortab and Ultram), Diflucan, iodine, morphine, flu vaccine, Tagamet and penicillin.      ADMISSION MEDICATIONS:   1.  Amlodipine 7.5 mg p.o. daily.   2.  Refresh Plus 0.5% ophthalmic solution 1 drop both eyes daily p.r.n. dry eyes.   3.  Coreg 6.25 mg p.o. b.i.d.   4.  Advair Diskus 250/50 one puff b.i.d.   5.  Gabapentin 400 mg b.i.d.   6.  Levemir 7 units subq at bedtime.   7.  Lisinopril 40 mg p.o. daily.   8.  Remeron 30 mg p.o. at bedtime.   9.  Sublingual nitroglycerin 0.4 mg p.r.n.   10.  MiraLax 17 grams every other day.   11.  Simvastatin 20 mg p.o. daily.   12.  Ultram 50 mg daily in a.m. and every 6 hours p.r.n. moderate to severe pain.   13.  Tylenol 500 mg q.6 h. p.r.n. mild pain.   14.  Vitamin D 1000 international units p.o. daily.   15.  Aspirin 81 mg p.o. daily.   16.  Furosemide 40 mg p.o. daily.   17.  Ocuvite 1 tablet p.o. b.i.d.   18.  Oxybutynin 10 mg p.o. at bedtime.   19.  Lamisil 250 mg p.o. daily.      PAST MEDICAL HISTORY:  Known coronary artery disease, status post stents.  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  She also has a history of TIA, atrial fibrillation, diverticulosis, hyperlipidemia and a history of previous intracranial bleeding after a fall (incidental finding of a small bleed on CT head May 2016) as well as DJD. She has a history of macular degeneration.     PAST SURGICAL HISTORY:  Significant for  angioplasty in 1993,1995 and 2003, left knee arthroscopy, left total knee arthroplasty in 2009, bilateral cataract surgery in 2008, cholecystectomy, repair of ventral hernia 09/26/2013, laparoscopic appendectomy, laparotomy with lysis of adhesions 09/26/2013 and sigmoidectomy for diverticulitis.      SOCIAL HISTORY:  She quit cigarettes over 40 years ago.  She drinks at most 1 beer every 6 months.  She is  and was at [facility] and more recently has been staying at [facility] for rehabilitation status post hospital admission for pneumonia with a fall at the beginning of this month. Her daughter states 2 months ago she was ambulating with her walker, but since the hospital stay she is not able to walk without assistance.     FAMILY HISTORY:  Significant for coronary artery disease in her daughter, brother with abdominal aortic aneurysm, hypertension in her mother and diabetes in her mother, brother and 2 sisters.      REVIEW OF SYSTEMS:  As per HPI.      OBJECTIVE:     GENERAL:  This is a pleasant, well-developed, well-nourished woman in no acute distress.  She is awake but somewhat confused, disoriented to place and repeatedly asks the same question.  She is able to recognize her daughter.   VITAL SIGNS:  Temperature 96 degrees Fahrenheit, pulse 80, respirations 13, blood pressure 118/69, pulse oximetry 96% on 2 liters nasal cannula.   HEENT:  Normocephalic, atraumatic.  Pupils reactive to light bilaterally, tympanic membranes pearly gray bilaterally.  Oropharynx without erythema or exudate.  Dentures present.   NECK:  Without lymphadenopathy or thyromegaly appreciated.   HEART:  Regular without murmurs, rubs or gallops appreciated.   LUNGS:  Clear to auscultation without rales, rhonchi or wheezes. There is good air movement to the lung bases.  ABDOMEN:  Soft and nontender with normoactive bowel sounds.  No masses or hepatosplenomegaly appreciated.     EXTREMITIES:  Right lower extremity  without edema.  Left lower extremity with 1+ pitting pretibial edema (patient's daughter is present and states this is chronic status post knee surgery).  Posterior tibial pulses present bilaterally.  Sensation to light touch intact in hands and feet.   5/5 bilaterally, plantar flexion 5/5 bilaterally.  Plantar response is downgoing bilaterally.   NEUROLOGIC:  She is awake and answers brief questions appropriately, is disoriented to place, but not person.      LABORATORY DATA:  Sodium 130, potassium 4.8, chloride 92, bicarbonate 22, BUN 22, creatinine 1.64 (previous BUN and creatinine at time of discharge from previous hospital stay was not significantly elevated).  Calcium 8.7, albumin 3.0.  LFTs within normal limits except for mildly elevated AST of 51 units per liter.  Troponin I elevated at 0.52 mcg per liter.  WBC 5.4, hemoglobin 14.3, hematocrit 41.1, platelets 236 with 55% neutrophils.  INR 1.01.  PTT 24.  UA positive for glucose, but otherwise negative (straight cath).      IMAGING:  Portable chest x-ray:  I do not appreciate any infiltrate or effusion.        EKG reveals 1.5 mm elevation in lead II and 2 mm elevation in leads III and aVF with 1 mm ST elevation in leads V4 through V6 with reciprocal changes in leads I and aVL.      ASSESSMENT AND PLAN:     1.  An  88-year-old woman with history of known coronary artery disease, status post stents, presents with acute inferior wall myocardial infarction (STEMI).  The patient does not want aggressive cares such as thrombolytics or transfer to [place] for cardiac catheterization.  Admit here and treated with intravenous heparin, intravenous nitroglycerin, aspirin, Brilinta, statin, Coreg and angiotensin-converting enzyme nhibitor as tolerated, oxygen and monitored bed, serial cardiac enzymes.  Watch for evidence of congestive heart failure, both clinically and follow weights and BNP.  Hold Amlodipine at present due to hypotension.  2.  Hypotension initially  which resolved with intravenous fluids.  Watch closely.  May need to back off on nitroglycerin and also may need to back off on beta blocker and angiotensin-converting enzyme inhibitor depending how her blood pressures run.  Will monitor closely.   3.  History of essential hypertension.  Continue medications if tolerated; however, again, we may need to hold some medications if blood pressure drops.   4.  Diabetes mellitus, which is insulin requiring.  Continue insulin and monitor blood sugars closely.   5.  History of recent urinary tract infection.  Current urinalysis is not suggestive of urinary tract infection.   6.  Acute renal insufficiency.  Give intravenous fluids and monitor.   7.  Hyponatremia.  Suspect this may be related to vomiting.  Give low dose normal saline and monitor.   8.  Generalized weakness with recent falls.  Will need to restart physical therapy and occupational therapy, but hold at this time because of acute myocardial  infarction.   9.  Deep venous thrombosis prophylaxis.  The patient is on intravenous heparin.   10.  Estimated length of stay approximately 3-5 days for treatment of acute myocardial infarction and monitoring.  Also plan to check echocardiogram in several days when available to assess wall motion abnormalities and ejection fraction.    11.  Code status:  Discussed with the patient's daughter, Francesca, who is here with her tonight who states she has discussed this with the patient and her siblings and they would like DNR/DNI.  Also of note, the emergency room physician spoke with the patient's daughter earlier tonight regarding aggressiveness of care including thrombolytics and/or transfer to [place] for possible angioplasty.  She would not like transfer at this time, but treatment with heparin and other medications as noted.   12.  Previous history of atrial fibrillation.  Cardiac monitoring at this time.   13.  History of diverticulitis.   14.  Hyperlipidemia.  Continue  medication.   15.  Degenerative joint disease status post left total knee replacement with chronic venous stasis edema, left lower extremity.   16.  History of intracranial bleed, status post fall (incidental finding on CT May 2016, resolved on subsequent CT).   17.  History of transient ischemic attacks.   18.  History of depression.  19.  History of Macular Degeneration.        YOGESH ALEXANDRA MD             D: 2017 03:33   T: 2017 08:43   MT: CASSANDRA      Name:     MONTRELL ALCANTAR   MRN:      [id number]        Account:      [id number]   :      1929           Admitted:     [id number]      Document: [id number]       cc: Boston Cagle DO[KG1.1]        Revision History        User Key Date/Time User Provider Type Action    > KG1.1 2017  6:05 PM Yogesh Alexandra MD Physician Sign     [N/A] 2017  8:44 AM Yogesh Alexandra MD Physician Edit                     Discharge Summaries      Discharge Summaries by Boston Cagle DO at 2017  7:34 AM     Author:  Boston Cagle DO Service:  Family Medicine Author Type:  Osteopath    Filed:  2017  7:36 AM Date of Service:  2017  7:34 AM Creation Time:  2017  7:34 AM    Status:  Signed :  Boston Cagle DO (Osteopcameron)         [facility]    Discharge Summary  Hospitalist    Date of Admission:  2017  Date of Discharge:  2017  Discharging Provider: Boston Cagle  Date of Service (when I saw the patient): 17      Hospital Course   Montrell Alcantar was admitted on 2017.  The following problems were addressed during her hospitalization:    Active Problems:    Acute transmural inferior wall MI (H)    Assessment: stable and slowly improving but pt has dementia which has worsened from this and will need comfort care and hospice has been consulted.    Plan: will transfer to NH today and add hospice to help bridge the transition.      Boston Cagle    Significant Results and  Procedures       Pending Results   These results will be followed up by ismael  Unresulted Labs Ordered in the Past 30 Days of this Admission     No orders found from 4/18/2017 to 6/18/2017.          Code Status   DNR / DNI       Primary Care Physician   Boston Cagle[MC1.1]    Physical Exam   Temp: 97.9  F (36.6  C) Temp src: Tympanic BP: 126/60   Heart Rate: 93 Resp: 18 SpO2: 96 % O2 Device: Nasal cannula Oxygen Delivery: 2 LPM  Vitals:    06/16/17 2300 06/17/17 0153   Weight: 89.8 kg (198 lb) 85.5 kg (188 lb 7.9 oz)[MC1.2]     Vital Signs with Ranges[MC1.1]  Temp:  [97.9  F (36.6  C)] 97.9  F (36.6  C)  Heart Rate:  [77-93] 93  Resp:  [18-20] 18  BP: (126)/(60) 126/60  SpO2:  [96 %-98 %] 96 %  I/O last 3 completed shifts:  In: 360 [P.O.:360]  Out: 1550 [Urine:1550][MC1.2]    Respiratory: No increased work of breathing, good air exchange, clear to auscultation bilaterally, no crackles or wheezing  Cardiovascular: Normal apical impulse, regular rate and rhythm, normal S1 and S2, no S3 or S4, and no murmur noted  Neurologic: awake but confused and so far still cooperative. No focal defecits    Discharge Disposition   Discharged to nursing home  Condition at discharge: Stable    Consultations This Hospital Stay   SOCIAL WORK IP CONSULT  PHYSICAL THERAPY ADULT IP CONSULT  OCCUPATIONAL THERAPY ADULT IP CONSULT    Time Spent on this Encounter   IBoston, personally saw the patient today and spent greater than 30 minutes discharging this patient.    Discharge Orders     HOSPICE REFERRAL     General info for SNF   Length of Stay Estimate: Long Term Care  Condition at Discharge: Stable  Level of care:skilled   Rehabilitation Potential: Fair  Admission H&P remains valid and up-to-date: Yes  Recent Chemotherapy: N/A  Use Nursing Home Standing Orders: Yes     Mantoux instructions   Give two-step Mantoux (PPD) Per Facility Policy Yes     Glucose monitor nursing POCT   Before meals and at bedtime      Activity - Up ad jian     DNR/DNI     Advance Diet as Tolerated   Follow this diet upon discharge: Regular       Discharge Medications   Current Discharge Medication List      CONTINUE these medications which have NOT CHANGED    Details   Furosemide (LASIX PO) Take 40 mg by mouth daily      !! TRAMADOL HCL PO Take 50 mg by mouth daily       fluticasone-salmeterol (ADVAIR) 250-50 MCG/DOSE diskus inhaler Inhale 1 puff into the lungs 2 times daily      Insulin Detemir (LEVEMIR SC) Inject 7 Units Subcutaneous At Bedtime       Acetaminophen (TYLENOL PO) Take 500 mg by mouth every 6 hours as needed for mild pain or fever      NITROGLYCERIN SL Place 0.4 mg under the tongue every 5 minutes as needed for chest pain x3 prn      MIRTAZAPINE PO Take 30 mg by mouth At Bedtime      ASPIRIN ADULT LOW STRENGTH PO Take 81 mg by mouth daily      CARVEDILOL PO Take 6.25 mg by mouth 2 times daily (with meals)      polyethylene glycol (MIRALAX/GLYCOLAX) powder Take 1 capful by mouth every other day       GABAPENTIN PO Take 400 mg by mouth 2 times daily       OXYBUTYNIN CHLORIDE PO Take 10 mg by mouth At Bedtime       VITAMIN D, CHOLECALCIFEROL, PO Take 1,000 Units by mouth daily      multivitamin (OCUVITE) TABS Take 1 tablet by mouth 2 times daily       LISINOPRIL PO Take 40 mg by mouth daily      Simvastatin (ZOCOR PO) Take 20 mg by mouth At Bedtime       !! TRAMADOL HCL PO Take 50 mg by mouth every 6 hours as needed for moderate to severe pain      carboxymethylcellulose (REFRESH PLUS) 0.5 % SOLN ophthalmic solution Place 1 drop into both eyes daily as needed for dry eyes       !! - Potential duplicate medications found. Please discuss with provider.      STOP taking these medications       NONFORMULARY Comments:   Reason for Stopping:         amLODIPine (NORVASC) 5 MG tablet Comments:   Reason for Stopping:         terbinafine (LAMISIL) 250 MG tablet Comments:   Reason for Stopping:             Allergies   Allergies   Allergen  Reactions     Sulfasalazine Anaphylaxis     Pt takes asa & ibuprofen at home     Cimetidine      Codeine Sulfate      Tolerated percocet, lortab, and ultram     Diflucan      Iodine I 131 Tositumomab      Morphine Sulfate      No Clinical Screening - See Comments      Flu vaccine     Tagamet      Penicillins Rash     Entire body     Data   Most Recent 3 CBC's:[MC1.1]  Recent Labs   Lab Test  06/20/17   0507  06/18/17   0443  06/17/17   0804   WBC  3.3*  3.1*  4.1   HGB  12.5  11.9  13.3   MCV  96  97  98   PLT  187  169  145*[MC1.3]      Most Recent 3 BMP's:[MC1.1]  Recent Labs   Lab Test  06/20/17   0507  06/18/17   0443  06/17/17   0804   NA  134  137  133   POTASSIUM  4.0  4.5  4.5   CHLORIDE  100  102  97   CO2  24  27  28   BUN  14  19  20   CR  1.12*  1.31*  1.38*   ANIONGAP  10  8  8   KAILA  8.4*  8.3*  8.0*   GLC  186*  178*  220*[MC1.3]     Most Recent 2 LFT's:[MC1.1]  Recent Labs   Lab Test  06/17/17   0007  06/02/17   0957   AST  51*  34   ALT  40  33   ALKPHOS  98  96   BILITOTAL  0.4  0.5[MC1.3]     Most Recent INR's and Anticoagulation Dosing History:  Anticoagulation Dose History     Recent Dosing and Labs Latest Ref Rng & Units 9/25/2013 5/25/2016 6/2/2017 6/17/2017    INR 0.80 - 1.20 1.12 1.16 1.15 1.01        Most Recent 3 Troponin's:[MC1.1]  Recent Labs   Lab Test  06/18/17   0443  06/17/17   1626  06/17/17   0804   TROPI  5.983*  0.816*  0.224*[MC1.3]     Most Recent Cholesterol Panel:[MC1.1]No lab results found.[MC1.3]  Most Recent 6 Bacteria Isolates From Any Culture (See EPIC Reports for Culture Details):[MC1.1]  Recent Labs   Lab Test  06/17/17   0258  06/02/17   1509  06/02/17   1140  06/02/17   1130  06/02/17   1035  09/20/16   1257   CULT  No MRSA isolated  No MRSA isolated  No growth after 6 days  No growth after 6 days  >100,000 colonies/mL Escherichia coli*  No MRSA isolated[MC1.3]     Most Recent TSH, T4 and A1c Labs:[MC1.1]  Recent Labs   Lab Test  06/17/17   0804  06/02/17   0957    TSH   --   1.65   A1C  9.7*   --[MC1.3]           Revision History        User Key Date/Time User Provider Type Action    > MC1.3 6/20/2017  7:36 AM Boston Cagle DO Osteopath Sign     MC1.2 6/20/2017  7:35 AM Boston Cagle DO Osteopath      MC1.1 6/20/2017  7:34 AM Boston Cagle DO Osteopcameron                   Consult Notes     No notes of this type exist for this encounter.         Progress Notes - Physician (Notes for yesterday and today)      ED Notes signed by Jennifer Non-Provider at 6/20/2017  7:10 AM      Author:  Jennifer Non-Provider Service:  (none) Author Type:  (none)    Filed:  6/20/2017  7:10 AM Date of Service:  6/19/2017  2:37 PM Creation Time:  6/20/2017  7:10 AM    Status:  Signed :  Jennifer Non-Provider     Scan on 6/20/2017  7:10 AM by Jennifer Non-Provider : [facility], 6/16/17 1          Revision History        User Key Date/Time User Provider Type Action    > [N/A] 6/20/2017  7:10 AM Scan, Non-Provider (none) Sign            Progress Notes by Boston Cagle DO at 6/19/2017  8:15 AM     Author:  Boston Cagle DO Service:  Family Medicine Author Type:  Osteopath    Filed:  6/19/2017  8:20 AM Date of Service:  6/19/2017  8:15 AM Creation Time:  6/19/2017  8:15 AM    Status:  Signed :  Boston Cagle DO (Osteopath)         [facility] Practice Progress Note               Interval History:   Pt is doing better in chair today still confused but responds to questions              Review of Systems:   The 5 point Review of Systems is negative other than noted in the HPI             Medications:   I have reviewed this patient's current medications               Physical Exam:   Vitals were reviewed  Lungs:   Some basilar crackles will watch fluids     Cardiovascular:   Normal apical impulse, regular rate and rhythm, normal S1 and S2, no S3 or S4, and no murmur noted     Musculoskeletal:   No edema     Neurologic:   Awake  and responds but still some confusion as to what happened to her         Peripheral IV 06/17/17 Right Hand (Active)   Site Assessment WDL 6/19/2017  4:00 AM   Line Status Saline locked 6/19/2017  4:00 AM   Phlebitis Scale 0-->no symptoms 6/19/2017  4:00 AM   Infiltration Scale 0 6/19/2017  4:00 AM   Infiltration Site Treatment Method  None 6/18/2017  4:00 AM   Extravasation? No 6/17/2017  3:00 AM   Number of days:2       Rash 09/28/13 0211 Bilateral midline groin (Active)   Number of days:1360     Line/device assessment(s) completed for medical necessity         Data:   All laboratory data reviewed         Assessment and Plan:   Active Problems:    Acute transmural inferior wall MI (H)    Assessment: stable for now no pain noted and seems to be recovering     Plan: will cont to monitor for heart failure and watch fluid balance will try to get ambulatory and if participates will look to NH for rehab[MC1.1]     Revision History        User Key Date/Time User Provider Type Action    > MC1.1 6/19/2017  8:20 AM Boston Cagle, DO Herrera Sign                  Procedure Notes     No notes of this type exist for this encounter.      Progress Notes - Therapies (Notes from 06/17/17 through 06/20/17)     No notes of this type exist for this encounter.                                          INTERAGENCY TRANSFER FORM - LAB / IMAGING / EKG / EMG RESULTS   6/17/2017                       [facility]: [phone]            Unresulted Labs     None         Lab Results - 3 Days      Basic metabolic panel [[id number]] (Abnormal)  Resulted: 06/20/17 0558, Result status: Final result    Ordering provider: Francesca Franco MD  06/19/17 2300 Resulting lab: [facility]    Specimen Information    Type Source Collected On   Blood  06/20/17 0507          Components       Value Reference Range Flag Lab   Sodium 134 133 - 144 mmol/L  HI   Potassium 4.0 3.4 - 5.3 mmol/L  HI   Chloride 100 94 - 109 mmol/L  HI    Carbon Dioxide 24 20 - 32 mmol/L  HI   Anion Gap 10 3 - 14 mmol/L  HI   Glucose 186 70 - 99 mg/dL H HI   Urea Nitrogen 14 7 - 30 mg/dL  HI   Creatinine 1.12 0.52 - 1.04 mg/dL H HI   GFR Estimate 46 >60 mL/min/1.7m2 L HI   Comment:  Non  GFR Calc   GFR Estimate If Black 56 >60 mL/min/1.7m2 L HI   Comment:  African American GFR Calc   Calcium 8.4 8.5 - 10.1 mg/dL L HI            CBC with platelets [[id number]] (Abnormal)  Resulted: 06/20/17 0547, Result status: Final result    Ordering provider: Francesca Franco MD  06/19/17 2300 Resulting lab: [facility]    Specimen Information    Type Source Collected On   Blood  06/20/17 0507          Components       Value Reference Range Flag Lab   WBC 3.3 4.0 - 11.0 10e9/L L HI   RBC Count 3.80 3.8 - 5.2 10e12/L  HI   Hemoglobin 12.5 11.7 - 15.7 g/dL  HI   Hematocrit 36.3 35.0 - 47.0 %  HI   MCV 96 78 - 100 fl  HI   MCH 32.9 26.5 - 33.0 pg  HI   MCHC 34.4 31.5 - 36.5 g/dL  HI   RDW 13.4 10.0 - 15.0 %  HI   Platelet Count 187 150 - 450 10e9/L  HI            Glucose by meter [[id number]] (Abnormal)  Resulted: 06/19/17 1101, Result status: Final result    Ordering provider: Francesca Franco MD  06/19/17 1048 Resulting lab: POINT OF CARE TEST, GLUCOSE    Specimen Information    Type Source Collected On     06/19/17 1048          Components       Value Reference Range Flag Lab   Glucose 187 70 - 99 mg/dL H 170            Heparin Xa (10a) Level [[id number]]  Resulted: 06/18/17 0909, Result status: Final result    Ordering provider: Francesca Franco MD  06/18/17 0826 Resulting lab: [facility]    Specimen Information    Type Source Collected On   Blood  06/18/17 0847          Components       Value Reference Range Flag Lab   Heparin 10A Level 0.47 IU/mL  HI   Comment:         Therapeutic Range:     UFH:   0.15-0.35 IU/mL for low              intensity dosing            0.30-0.70 IU/mL for high              intensity dosing for               DVT and PE     Enoxaparin: If administered              once daily with dose              1.5mg/k.0-2.0 IU/mL                 If administered              twice daily with dose              1mg/k.60-1.0 IU/mL              Active MRSA Surveillance Culture [[id number]]  Resulted: 17 0631, Result status: Final result    Ordering provider: Boston Cagle DO  17 0256 Resulting lab: [facility]    Specimen Information    Type Source Collected On   Swab Nasopharyngeal 17 0258          Components       Value Reference Range Flag Lab   Specimen Description Swab   HI   Culture Micro No MRSA isolated   HI   Micro Report Status FINAL 2017   HI            Troponin I [[id number]] (Abnormal)  Resulted: 17 0511, Result status: Final result    Ordering provider: Francesca Franco MD  17 2300 Resulting lab: [facility]    Specimen Information    Type Source Collected On   Blood  17 0443          Components       Value Reference Range Flag Lab   Troponin I ES 5.983 0.000 - 0.045 ug/L [facility]   Comment:         The 99th percentile for upper reference range is 0.045 ug/L.  Troponin values   in   the range of 0.045 - 0.120 ug/L may be associated with risks of adverse   clinical events.   Critical Value called to and read back by  Cary Pedraza at 0510 by               Basic metabolic panel [[id number]] (Abnormal)  Resulted: 17 0510, Result status: Final result    Ordering provider: Francesca Franco MD  17 2300 Resulting lab: [facility]    Specimen Information    Type Source Collected On   Blood  17 0443          Components       Value Reference Range Flag Lab   Sodium 137 133 - 144 mmol/L  HI   Potassium 4.5 3.4 - 5.3 mmol/L  HI   Chloride 102 94 - 109 mmol/L  HI   Carbon Dioxide 27 20 - 32 mmol/L  HI   Anion Gap 8 3 - 14 mmol/L  HI   Glucose 178 70 - 99 mg/dL H HI   Urea Nitrogen 19 7 - 30 mg/dL  HI   Creatinine 1.31  0.52 - 1.04 mg/dL H HI   GFR Estimate 38 >60 mL/min/1.7m2 L HI   Comment:  Non  GFR Calc   GFR Estimate If Black 46 >60 mL/min/1.7m2 L HI   Comment:  African American GFR Calc   Calcium 8.3 8.5 - 10.1 mg/dL L HI            Magnesium [[id number]]  Resulted: 17 0510, Result status: Final result    Ordering provider: Francesca Franco MD  170 Resulting lab: [facility]    Specimen Information    Type Source Collected On   Blood  17          Components       Value Reference Range Flag Lab   Magnesium 2.2 1.6 - 2.3 mg/dL  HI            Heparin Xa (10a) Level [[id number]]  Resulted: 17, Result status: Final result    Ordering provider: Francesca Franco MD  170 Resulting lab: [facility]    Specimen Information    Type Source Collected On   Blood  17          Components       Value Reference Range Flag Lab   Heparin 10A Level 0.62 IU/mL  HI   Comment:         Therapeutic Range:     UFH:   0.15-0.35 IU/mL for low              intensity dosing            0.30-0.70 IU/mL for high              intensity dosing for              DVT and PE     Enoxaparin: If administered              once daily with dose              1.5mg/k.0-2.0 IU/mL                 If administered              twice daily with dose              1mg/k.60-1.0 IU/mL              CBC with platelets [[id number]] (Abnormal)  Resulted: 17, Result status: Final result    Ordering provider: Francesca Franco MD  170 Resulting lab: [facility]    Specimen Information    Type Source Collected On   Blood  17          Components       Value Reference Range Flag Lab   WBC 3.1 4.0 - 11.0 10e9/L L HI   RBC Count 3.54 3.8 - 5.2 10e12/L L HI   Hemoglobin 11.9 11.7 - 15.7 g/dL  HI   Hematocrit 34.2 35.0 - 47.0 % L HI   MCV 97 78 - 100 fl  HI   MCH 33.6 26.5 - 33.0 pg H HI   MCHC 34.8 31.5 - 36.5 g/dL  HI   RDW 13.3 10.0 -  15.0 %  HI   Platelet Count 169 150 - 450 10e9/L  HI            Heparin Xa (10a) Level [[id number]] (Abnormal)  Resulted: 17 0118, Result status: Final result    Ordering provider: Francesca Franco MD  17 2332 Resulting lab: [facility]    Specimen Information    Type Source Collected On   Blood  17 2355          Components       Value Reference Range Flag Lab   Heparin 10A Level 1.11 IU/mL  HI   Comment:         Therapeutic Range:     UFH:   0.15-0.35 IU/mL for low              intensity dosing            0.30-0.70 IU/mL for high              intensity dosing for              DVT and PE     Enoxaparin: If administered              once daily with dose              1.5mg/k.0-2.0 IU/mL                 If administered              twice daily with dose              1mg/k.60-1.0 IU/mL   Critical Value:   Critical value if patient is receiving   unfractionated heparin: >1 IU/mL, critical   range does not apply if patient is receiving   low molecular weight heparin.              Glucose by meter [[id number]] (Abnormal)  Resulted: 17, Result status: Final result    Ordering provider: Francesca Franco MD  17 Resulting lab: POINT OF CARE TEST, GLUCOSE    Specimen Information    Type Source Collected On     17 2112          Components       Value Reference Range Flag Lab   Glucose 208 70 - 99 mg/dL H 170            Glucose by meter [[id number]] (Abnormal)  Resulted: 17 1851, Result status: Final result    Ordering provider: Francesca Franco MD  17 1830 Resulting lab: POINT OF CARE TEST, GLUCOSE    Specimen Information    Type Source Collected On     17 1830          Components       Value Reference Range Flag Lab   Glucose 219 70 - 99 mg/dL H 170            UA with Microscopic reflex to Culture [[id number]] (Abnormal)  Resulted: 17 1801, Result status: Final result    Ordering provider: Francesca Franco MD  17 1725 Resulting lab:  [facility]    Specimen Information    Type Source Collected On   Urine  06/17/17 1730          Components       Value Reference Range Flag Lab   Color Urine Straw   HI   Appearance Urine Clear   HI   Glucose Urine Negative NEG mg/dL  HI   Bilirubin Urine Negative NEG  HI   Ketones Urine Negative NEG mg/dL  HI   Specific Gravity Urine 1.003 1.003 - 1.035  HI   Blood Urine Negative NEG  HI   pH Urine 7.5 4.7 - 8.0 pH  HI   Protein Albumin Urine Negative NEG mg/dL  HI   Urobilinogen mg/dL Normal 0.0 - 2.0 mg/dL  HI   Nitrite Urine Negative NEG  HI   Leukocyte Esterase Urine Negative NEG  HI   Source Midstream Urine   HI   WBC Urine <1 0 - 2 /HPF  HI   RBC Urine <1 0 - 2 /HPF  HI   Bacteria Urine None NEG /HPF A HI   Mucous Urine Present NEG /LPF A HI            Magnesium (AM Draw) [[id number]]  Resulted: 06/17/17 1737, Result status: Final result    Ordering provider: Francesca Franco MD  06/17/17 1724 Resulting lab: [facility]    Specimen Information    Type Source Collected On   Blood  06/17/17 1626          Components       Value Reference Range Flag Lab   Magnesium 2.1 1.6 - 2.3 mg/dL  HI   Comment:  Specimen slightly hemolyzed            Troponin I [[id number]] (Abnormal)  Resulted: 06/17/17 1735, Result status: Final result    Ordering provider: Francesca Franco MD  06/17/17 1000 Resulting lab: [facility]    Specimen Information    Type Source Collected On   Blood  06/17/17 1626          Components       Value Reference Range Flag Lab   Troponin I ES 0.816 0.000 - 0.045 ug/L [facility]   Comment:         The 99th percentile for upper reference range is 0.045 ug/L.  Troponin values   in   the range of 0.045 - 0.120 ug/L may be associated with risks of adverse   clinical events.   Critical Value called to and read back by  ARYAN HODGE ON 6/17/17 AT 1734 BY NELSON              Heparin Xa (10a) Level [[id number]] (Abnormal)  Resulted: 06/17/17 1649, Result status: Final  result    Ordering provider: Francesca Franco MD  17 1623 Resulting lab: [facility]    Specimen Information    Type Source Collected On   Blood  17 1626          Components       Value Reference Range Flag Lab   Heparin 10A Level 1.90 IU/mL  HI   Comment:         Critical Value called to and read back by  ANNELYSE STROMBERG ON 17 AT 1649 BY NELSON  Therapeutic Range:     UFH:   0.15-0.35 IU/mL for low              intensity dosing            0.30-0.70 IU/mL for high              intensity dosing for              DVT and PE     Enoxaparin: If administered              once daily with dose              1.5mg/k.0-2.0 IU/mL                 If administered              twice daily with dose              1mg/k.60-1.0 IU/mL   Critical Value:   Critical value if patient is receiving   unfractionated heparin: >1 IU/mL, critical   range does not apply if patient is receiving   low molecular weight heparin.              Glucose by meter [[id number]] (Abnormal)  Resulted: 17 1201, Result status: Final result    Ordering provider: Francesca Franco MD  17 1152 Resulting lab: POINT OF CARE TEST, GLUCOSE    Specimen Information    Type Source Collected On     17 1152          Components       Value Reference Range Flag Lab   Glucose 171 70 - 99 mg/dL H 170            Heparin Xa (10a) Level [[id number]]  Resulted: 17 1013, Result status: Final result    Ordering provider: Francesca Franco MD  17 0929 Resulting lab: [facility]    Specimen Information    Type Source Collected On   Blood  17 0948          Components       Value Reference Range Flag Lab   Heparin 10A Level 0.93 IU/mL  HI   Comment:         Therapeutic Range:     UFH:   0.15-0.35 IU/mL for low              intensity dosing            0.30-0.70 IU/mL for high              intensity dosing for              DVT and PE     Enoxaparin: If administered              once daily with dose               1.5mg/k.0-2.0 IU/mL                 If administered              twice daily with dose              1mg/k.60-1.0 IU/mL              Glucose by meter [[id number]] (Abnormal)  Resulted: 17 0851, Result status: Final result    Ordering provider: Francesca Franco MD  17 0842 Resulting lab: POINT OF CARE TEST, GLUCOSE    Specimen Information    Type Source Collected On     17 0842          Components       Value Reference Range Flag Lab   Glucose 227 70 - 99 mg/dL H 170            Estimated Average Glucose [[id number]]  Resulted: 1749, Result status: Final result    Ordering provider: Francesca Franco MD  17 0804 Resulting lab: [facility]    Specimen Information    Type Source Collected On     17 0804          Components       Value Reference Range Flag Lab   Estimated Average Glucose 232 mg/dL  HI            Basic metabolic panel [[id number]] (Abnormal)  Resulted: 17, Result status: Final result    Ordering provider: Francesca Franco MD  17 0259 Resulting lab: [facility]    Specimen Information    Type Source Collected On   Blood  17 0804          Components       Value Reference Range Flag Lab   Sodium 133 133 - 144 mmol/L  HI   Potassium 4.5 3.4 - 5.3 mmol/L  HI   Comment:  Specimen slightly hemolyzed   Chloride 97 94 - 109 mmol/L  HI   Carbon Dioxide 28 20 - 32 mmol/L  HI   Anion Gap 8 3 - 14 mmol/L  HI   Glucose 220 70 - 99 mg/dL H HI   Urea Nitrogen 20 7 - 30 mg/dL  HI   Creatinine 1.38 0.52 - 1.04 mg/dL H HI   GFR Estimate 36 >60 mL/min/1.7m2 L HI   Comment:  Non  GFR Calc   GFR Estimate If Black 44 >60 mL/min/1.7m2 L HI   Comment:  African American GFR Calc   Calcium 8.0 8.5 - 10.1 mg/dL L HI            Troponin I [[id number]] (Abnormal)  Resulted: 17, Result status: Final result    Ordering provider: Francesca Franco MD  17 0300 Resulting lab: [facility]     Specimen Information    Type Source Collected On   Blood  06/17/17 0804          Components       Value Reference Range Flag Lab   Troponin I ES 0.224 0.000 - 0.045 ug/L  HI   Comment:         The 99th percentile for upper reference range is 0.045 ug/L.  Troponin values   in   the range of 0.045 - 0.120 ug/L may be associated with risks of adverse   clinical events.   Critical Value called to and read back by  NANCY BALES AT 0848 06/17/17 AJE              Nt probnp inpatient [[id number]]  Resulted: 06/17/17 0849, Result status: Final result    Ordering provider: Francesca Franco MD  06/17/17 0305 Resulting lab: [facility]    Specimen Information    Type Source Collected On   Blood  06/17/17 0804          Components       Value Reference Range Flag Lab   N-Terminal Pro BNP Inpatient 788 0 - 1800 pg/mL  HI   Comment:         Reference range shown and results flagged as abnormal are suggested inpatient   cut points for confirming diagnosis if CHF in an acute setting. Establishing   a   baseline value for each individual patient is useful for follow-up. An   inpatient or emergency department NT-proPBNP <300 pg/mL effectively rules out   acute CHF, with 99% negative predictive value.  The outpatient non-acute reference range for ruling out CHF is:   0-125 pg/mL (age 18 to less than 75)   0-450 pg/mL (age 75 yrs and older)              Hemoglobin A1c [[id number]] (Abnormal)  Resulted: 06/17/17 0847, Result status: Final result    Ordering provider: Francesca Franco MD  06/17/17 0305 Resulting lab: [facility]    Specimen Information    Type Source Collected On   Blood  06/17/17 0804          Components       Value Reference Range Flag Lab   Hemoglobin A1C 9.7 4.3 - 6.0 % H HI            CBC with platelets differential [[id number]] (Abnormal)  Resulted: 06/17/17 0816, Result status: Final result    Ordering provider: Francesca Franco MD  06/17/17 0259 Resulting lab: [facility]    Specimen Information    Type Source Collected On   Blood  06/17/17 0804          Components       Value Reference Range Flag Lab   WBC 4.1 4.0 - 11.0 10e9/L  HI   RBC Count 4.06 3.8 - 5.2 10e12/L  HI   Hemoglobin 13.3 11.7 - 15.7 g/dL  HI   Hematocrit 39.6 35.0 - 47.0 %  HI   MCV 98 78 - 100 fl  HI   MCH 32.8 26.5 - 33.0 pg  HI   MCHC 33.6 31.5 - 36.5 g/dL  HI   RDW 13.2 10.0 - 15.0 %  HI   Platelet Count 145 150 - 450 10e9/L L HI   Diff Method Automated Method   HI   % Neutrophils 52.8 %  HI   % Lymphocytes 36.0 %  HI   % Monocytes 9.1 %  HI   % Eosinophils 1.7 %  HI   % Basophils 0.2 %  HI   % Immature Granulocytes 0.2 %  HI   Nucleated RBCs 0 0 /100  HI   Absolute Neutrophil 2.1 1.6 - 8.3 10e9/L  HI   Absolute Lymphocytes 1.5 0.8 - 5.3 10e9/L  HI   Absolute Monocytes 0.4 0.0 - 1.3 10e9/L  HI   Absolute Eosinophils 0.1 0.0 - 0.7 10e9/L  HI   Absolute Basophils 0.0 0.0 - 0.2 10e9/L  HI   Abs Immature Granulocytes 0.0 0 - 0.4 10e9/L  HI   Absolute Nucleated RBC 0.0   HI            Glucose by meter [[id number]] (Abnormal)  Resulted: 06/17/17 0350, Result status: Final result    Ordering provider: Francesca Franco MD  06/17/17 0312 Resulting lab: POINT OF CARE TEST, GLUCOSE    Specimen Information    Type Source Collected On     06/17/17 0312          Components       Value Reference Range Flag Lab   Glucose 257 70 - 99 mg/dL H 170            UA with Microscopic reflex to Culture [[id number]] (Abnormal)  Resulted: 06/17/17 0320, Result status: Final result    Ordering provider: Francesca Franco MD  06/17/17 0250 Resulting lab: [facility]    Specimen Information    Type Source Collected On   Urine  06/17/17 0255          Components       Value Reference Range Flag Lab   Color Urine Yellow   HI   Appearance Urine Clear   HI   Glucose Urine 70 NEG mg/dL A HI   Bilirubin Urine Negative NEG  HI   Ketones Urine Negative NEG mg/dL  HI   Specific Gravity Urine 1.006 1.003 - 1.035  HI   Blood Urine  Negative NEG  HI   pH Urine 6.5 4.7 - 8.0 pH  HI   Protein Albumin Urine Negative NEG mg/dL  HI   Urobilinogen mg/dL Normal 0.0 - 2.0 mg/dL  HI   Nitrite Urine Negative NEG  HI   Leukocyte Esterase Urine Negative NEG  HI   Source Catheterized Urine   HI   WBC Urine <1 0 - 2 /HPF  HI   RBC Urine <1 0 - 2 /HPF  HI   Bacteria Urine None NEG /HPF A HI   Mucous Urine Present NEG /LPF A HI   Hyaline Casts 1 0 - 2 /LPF A HI            Comprehensive metabolic panel [[id number]] (Abnormal)  Resulted: 06/17/17 0044, Result status: Final result    Ordering provider: Hosea Diaz MD  06/17/17 0030 Resulting lab: [facility]    Specimen Information    Type Source Collected On   Blood  06/17/17 0007          Components       Value Reference Range Flag Lab   Sodium 130 133 - 144 mmol/L L HI   Potassium 4.8 3.4 - 5.3 mmol/L  HI   Chloride 92 94 - 109 mmol/L L HI   Carbon Dioxide 26 20 - 32 mmol/L  HI   Anion Gap 12 3 - 14 mmol/L  HI   Glucose 269 70 - 99 mg/dL H HI   Urea Nitrogen 22 7 - 30 mg/dL  HI   Creatinine 1.64 0.52 - 1.04 mg/dL H HI   GFR Estimate 30 >60 mL/min/1.7m2 L HI   GFR Estimate If Black 36 >60 mL/min/1.7m2 L HI   Calcium 8.7 8.5 - 10.1 mg/dL  HI   Bilirubin Total 0.4 0.2 - 1.3 mg/dL  HI   Albumin 3.0 3.4 - 5.0 g/dL L HI   Protein Total 7.7 6.8 - 8.8 g/dL  HI   Alkaline Phosphatase 98 40 - 150 U/L  HI   ALT 40 0 - 50 U/L  HI   AST 51 0 - 45 U/L H HI            Troponin I [[id number]] (Abnormal)  Resulted: 06/17/17 0044, Result status: Final result    Ordering provider: Hosea Diaz MD  06/17/17 0030 Resulting lab: [facility]    Specimen Information    Type Source Collected On   Blood  06/17/17 0007          Components       Value Reference Range Flag Lab   Troponin I ES 0.052 0.000 - 0.045 ug/L H HI   Comment:         The 99th percentile for upper reference range is 0.045 ug/L.  Troponin values   in   the range of 0.045 - 0.120 ug/L may be associated with risks of  adverse   clinical events.              Partial thromboplastin time [[id number]]  Resulted: 06/17/17 0043, Result status: Final result    Ordering provider: Hosea Diaz MD  06/17/17 0030 Resulting lab: [facility]    Specimen Information    Type Source Collected On   Blood  06/17/17 0007          Components       Value Reference Range Flag Lab   PTT 24 24.00 - 37.00 sec  HI            INR [[id number]]  Resulted: 06/17/17 0043, Result status: Final result    Ordering provider: Hosea Diaz MD  06/17/17 0030 Resulting lab: [facility]    Specimen Information    Type Source Collected On   Blood  06/17/17 0007          Components       Value Reference Range Flag Lab   INR 1.01 0.80 - 1.20  HI            CBC with platelets differential [[id number]]  Resulted: 06/17/17 0036, Result status: Final result    Ordering provider: Hosea Diaz MD  06/17/17 0030 Resulting lab: [facility]    Specimen Information    Type Source Collected On   Blood  06/17/17 0007          Components       Value Reference Range Flag Lab   WBC 5.4 4.0 - 11.0 10e9/L  HI   RBC Count 4.37 3.8 - 5.2 10e12/L  HI   Hemoglobin 14.3 11.7 - 15.7 g/dL  HI   Hematocrit 41.1 35.0 - 47.0 %  HI   MCV 94 78 - 100 fl  HI   MCH 32.7 26.5 - 33.0 pg  HI   MCHC 34.8 31.5 - 36.5 g/dL  HI   RDW 13.0 10.0 - 15.0 %  HI   Platelet Count 236 150 - 450 10e9/L  HI   Diff Method Automated Method   HI   % Neutrophils 55.3 %  HI   % Lymphocytes 33.4 %  HI   % Monocytes 9.0 %  HI   % Eosinophils 1.5 %  HI   % Basophils 0.4 %  HI   % Immature Granulocytes 0.4 %  HI   Nucleated RBCs 0 0 /100  HI   Absolute Neutrophil 3.0 1.6 - 8.3 10e9/L  HI   Absolute Lymphocytes 1.8 0.8 - 5.3 10e9/L  HI   Absolute Monocytes 0.5 0.0 - 1.3 10e9/L  HI   Absolute Eosinophils 0.1 0.0 - 0.7 10e9/L  HI   Absolute Basophils 0.0 0.0 - 0.2 10e9/L  HI   Abs Immature Granulocytes 0.0 0 - 0.4 10e9/L  HI   Absolute Nucleated RBC 0.0    HI            Testing Performed By     Lab - Abbreviation Name Director Address Valid Date Range    170 - Unknown POINT OF CARE TEST, GLUCOSE Unknown Unknown 10/31/11 1114 - Present    210 - HI [facility] Unknown [address] [facility] [address] 05/08/15 1057 - Present               Imaging Results - 3 Days      XR Chest Port 1 View [[id number]]  Resulted: 06/18/17 2214, Result status: Final result    Ordering provider: Francesca Franco MD  06/17/17 1142 Resulted by: Maggie Gatica MD    Performed: 06/18/17 0800 - 06/18/17 0537 Resulting lab: [facility]    Narrative:           CHEST X-RAY    CLINICAL HISTORY:  Follow up pleural effusion.    COMPARISON:  Today's study is compared to a prior examination which is  dated June 17, 2017.    FINDINGS:  Portable frontal view of the chest demonstrates the cardiac  silhouette is enlarged but stable.  The pulmonary vascular congestion  has improved.  There are persistent airspace opacities in the left  lung base, but there has been interval improvement in the pleural  effusions.  No pneumothorax.    IMPRESSION:  IMPROVING CONGESTIVE HEART FAILURE.  PERSISTENT LEFT  BASILAR INFILTRATE OR ATELECTASIS/EDEMA.  Exam Date: Jun 18, 2017 05:37:26 AM  Author: MAGGIE GATICA  This report is final and signed      Specimen Information    Type Source Collected On     06/18/17 0514            XR Chest Port 1 View [[id number]]  Resulted: 06/17/17 1432, Result status: Final result    Ordering provider: Francesca Franco MD  06/17/17 0254 Resulted by: Maggie Gatica MD    Performed: 06/17/17 0800 - 06/17/17 0810 Resulting lab: [facility]    Narrative:           PORTABLE CHEST    HISTORY:  Acute MI.    COMPARISON:  Today's study is compared to prior examinations which are  dated June 17, 2017 and June 2, 2017.    FINDINGS:  Portable view of the chest demonstrates the cardiac  silhouette is mildly enlarged.  There are increasing opacities in the  lower lungs and increasing  pulmonary vascular congestion.  There is  also a small left pleural effusion.  No pneumothorax.    IMPRESSION:  FINDINGS MAY REPRESENT DEVELOPING CONGESTIVE HEART  FAILURE.  Exam Date: Jun 17, 2017 08:10:00 AM  Author: MAGGIE GATICA  This report is final and signed      Specimen Information    Type Source Collected On     06/17/17 0805            XR Chest Port 1 View [[id number]]  Resulted: 06/17/17 1419, Result status: Final result    Ordering provider: Hosea Diaz MD  06/17/17 0010 Resulted by: Maggie Gaitca MD    Performed: 06/17/17 0011 - 06/17/17 0017 Resulting lab: [facility]    Narrative:           CHEST    HISTORY:  Chest pain.    COMPARISON:  Today's study is compared to a prior examination which is  dated June 2, 2017.    FINDINGS:  A portable view of the chest demonstrates the cardiac  silhouette is stable in size.  The pulmonary vasculature appears  normal.  The lungs are grossly clear, but obscured by the presence of  monitoring leads.  No pneumothorax.    IMPRESSION:  STABLE CHEST.  Exam Date: Jun 17, 2017 12:17:45 AM  Author: MAGGIE GATICA  This report is final and signed      Specimen Information    Type Source Collected On     06/17/17 0012            Testing Performed By     Lab - Abbreviation Name Director [facility] Valid Date Range    [facility] Unknown 11/29/12 1227 - Present               ECG/EMG Results      Echocardiogram Complete [[id number]]  Resulted: 06/20/17 0039, Result status: Preliminary result    Ordering provider: Francesca Franco MD  06/19/17 0615 Resulted by: Deo Brice MD    Performed: 06/19/17 0800 - 06/19/17 0856 Resulting lab: [facility]    Narrative:           ECHOCARDIOGRAM    M-mode, two-dimensional, color-flow, and Doppler studies were obtained  on this patient.  Standard views were utilized.    ORDERING PHYSICIAN:  Francesca Franco MD    INDICATIONS:  Acute MI    Cardiac Dimensions                                    Normal  Dimensions  Aortic Root:                              29.2 mm      Aortic Root  Diameter: 20-37 mm  Left Atrium:                                45 mm      Left Atrium:  19-40 mm  Right Ventricle:                not measured      Right Ventricle:  7-23 mm  Left Ventricle end-diastole:    49.4 mm      Left Ventricle  end-diastole: 35-56 mm  Left Ventricle end-systole:      30.9 mm      Left Ventricle  end-systole: 35-56 mm  IVS end-diastole:                     10.6 mm      IVS end-diastole:  7-11 mm  LVPW:                                       9.3 mm      LVPW: 7-11 mm    Review of the study shows there is no pericardial effusion.  Left  ventricle has normal size, systolic function.  Ejection fraction at  65%.  No obvious focal wall motion abnormalities are seen.  Left  atrium is enlarged; volume is 61 cc and indexed it is 32 cc per meters  squared. Right ventricle appears normal.  The mitral valve has a  calcified posterior mitral valve annulus, with trace mitral  regurgitation.  The aortic valve has aortic sclerosis without  stenosis, but there is mild to moderate aortic insufficiency.  Pressure half time is 485 milliseconds.   There is trace tricuspid  regurgitation and trace pulmonic valve insufficiency.  Unable to  estimate the right ventricle systolic pressure.  Diastolic indices  show reversal of the E to A ratio at 0.71, suggesting diastolic  dysfunction.    Continued on page 2        ASSESSMENT:  Echocardiographic study revealing  1.  Normal left ventricular size and systolic function. Ejection  fraction around 65%.  No obvious focal wall motion abnormality seen.  2.  Left atrial enlargement, mild.  3.  Normal right ventricle size and function.  4.  Calcified mitral valve annulus with trace mitral regurgitation.  5.  Aortic sclerosis without stenosis with moderate aortic aortic  insufficiency.  6.  Trace tricuspid and trace pulmonic valve insufficiency.  Unable to  estimate the right ventricle systolic pressure.  7.  Evidence of  diastolic dysfunction is seen.  Exam Date: Jun 19, 2017 08:56:47 AM  Author: CLAY PENG  This report is preliminary and transcribed      1    Type Source Collected On     06/19/17 0856                  Encounter-Level Documents:     There are no encounter-level documents.      Order-Level Documents:     There are no order-level documents.

## 2017-06-17 NOTE — ED NOTES
Daughter here. Pt had stated to Dr. Diaz she did not want to go to [place] in  A helicopter. Dr. Diaz spoke with daughter, Daughter in to talk with pt. Transport cancelled.

## 2017-06-17 NOTE — IP AVS SNAPSHOT
` ` Patient Information     Patient Name Sex Louise Saini ([id number]) Female 1929       Room Bed    3116  3116-1      Patient Demographics     Address Contact Numbers    [address]  [place] [address] [phone]      Patient Ethnicity & Race     Ethnic Group Patient Race    American White      Emergency Contact(s)     Name Relation Home Work Mobile    TADEO ALCANTAR Son [phone]    VIJI BUCKLEY Daughter [phone]    ALICIA RODRÍGUEZ Grandchild [phone]    KB WASHINGTON Daughter [phone]  none      Documents on File        Status Date Received Description       Documents for the Patient    Consent for Services - Hospital/Clinic-ESign Received () 13     [place] Privacy Notice-ESign Received 13     Patient ID Received 13     Consent for EHR Access-Received-ESign Received 13     Consent for EHR Access-Decline-ESign       Patient ID Received 13     Insurance Card Received 16 [facility] front    Insurance Card Received () 13 [employer] Back    Insurance Card Received () 13 [employer] Senior [employer] Front    Consent for Services - Hospital/Clinic-ESign Received () 14     Consent for Services - Hospital/Clinic-ESign Received () 09/10/15     Consent for Services/Privacy Notice - Hospital/Clinic-Esign Received () 16     Privacy Notice - [place] Received 16     HIM KENDRICK Authorization - File Only  () 16 AUTHORIZATION FOR RELEASE OF PHI    Insurance Card   Didn't have BXMN    Insurance Card Received 16     Insurance Card Received 16     Physical Therapy Certification Received 16     Occupational Therapy Certification Received 16     Advance Directives and Living Will Received 16 HEALTH CARE DIRECTIVE 2001    Advance Directives and Living Will Not Received  VALIDATION OF AD 2001    Consent  for Services/Privacy Notice - Hospital/Clinic Received 06/02/17     Power of  Received 03/23/17 STATUTORY SHORT FORM POA 07/13/2007    Insurance Card Received (Deleted) 09/25/13 [facility] back       Documents for the Encounter    CMS IM for Patient Signature Received 06/17/17 Health care POA verbal signature, patient being kept comfortable due lisa MI    CMS IM for Patient Signature Received 06/19/17 Letter completed with ernesto Yuan    EMS/Ambulance Record  06/20/17 AMBULANCE [facility]AS, 6/16/17      Admission Information     Attending Provider Admitting Provider Admission Type Admission Date/Time    [place] Cagle, Francesca Mishra MD Emergency 06/17/17  0008    Discharge Date Hospital Service Auth/Cert Status Service Area     Family Medicine Incomplete RANGE [facility] SERVICES    Unit Room/Bed Admission Status       [facility] MEDICAL SURGICAL 3116 /3116-1 Admission (Confirmed)       Admission     Complaint    Acute transmural inferior wall MI (H), Acute transmural inferior wall MI (H)      Hospital Account     Name Acct ID Class Status Primary Coverage    Louise Johnson [id number] Inpatient Open MEDICARE - MEDICARE FOR HB SUPPLEMENT            Guarantor Account (for Hospital Account #[id number])     Name Relation to Pt Service Area Active? Acct Type    Louise Johnson Self RANGE Yes Personal/Family    Address Phone          [address] [place] [address] [phone](H)              Coverage Information (for Hospital Account #[id number])     1. MEDICARE/MEDICARE FOR HB SUPPLEMENT     F/O Payor/Plan Precert #    MEDICARE/MEDICARE FOR HB SUPPLEMENT     Subscriber Subscriber #    Louise Johnson [id number]    Address Phone    [employer]  [address]  [place] [address] [phone]          2. BCBS/BCBS PLATINUM BLUE     F/O Payor/Plan Precert #    BCBS/BCBS PLATINUM BLUE     Subscriber Subscriber #    Louise Johnson [id number]    Address Phone    [address] [address]  [place]  [address] [phone]

## 2017-06-17 NOTE — DISCHARGE INSTRUCTIONS
Recognizing a Heart Attack or Angina  If you have risk factors for heart problems, you should always watch for signs of angina or a heart attack. If you have a sudden heart problem, getting treatment right away could save your life.   Risk factors for a heart attack include advanced age, high cholesterol, high blood pressure, having a family member who had a heart attack before the age of 50, diabetes, smoking, and stress. There are other risk factors including eating a high-fat diet and getting minimal exercise.  Understanding angina and heart attack    Angina is a painful burning, tightness, or pressure in the chest, back, neck, throat, or jaw. It means not enough blood is getting to the heart. This is usually from a blocked artery in the heart. Angina is a sign that you may be having, or are about to have, a heart attack. It needs to be addressed by a healthcare provider right away.    A heart attack, also known as acute myocardial infarction, or AMI, is what happens when blood can't get to part of the heart muscle. That part of the heart muscle is damaged and starts to die. If enough of the heart is affected, it will severely reduce its ability to provide blood to the rest of the body. It may cause death. It is vital to get help as soon as possible for a heart attack.  Stable angina versus unstable angina  Stable angina, also known as chronic angina, has a typical pattern. It occurs predictably with physical exertion or strong emotion. Nitroglycerin, rest, or both, will easily relieve stable angina symptoms. Stable angina symptoms will most likely feel the same each time you have them. It is important to discuss these symptoms with your healthcare provider. They can be a warning sign for a future heart attack.  Unstable angina causes unexpected or unpredictable symptoms, commonly occurring at rest, and is a medical emergency. Angina is also considered unstable if resting and nitroglycerin doesn't relieve  symptoms, It's also unstable if symptoms are worsening, occurring more often, or are lasting longer. These symptoms suggest a severe blockage or a spasm of a heart artery. Unstable angina is commonly a sign of an active heart attack. Remember the following tips:    Stable angina symptoms should go away with rest or medicine. If they don t go away, call 911!    Stable angina symptoms last for only a few minutes. If they last longer than that, or if they go away and come back, you may be having a heart attack. Call 911!    If you have shortness of breath, cold sweat, nausea, or lightheadedness, call 911!  For new-onset angina, there is only one response: ?call 911! You should never diagnose angina by yourself. If these symptoms are new, or worse than usual, call 911!  Warning signs of a heart attack  If you have symptoms that you can t explain, call 911 right away. Do not drive yourself to the emergency room. The following are warning signs of a possible heart attack:    Chest discomfort. Most heart attacks involve discomfort in the center of the chest that lasts more than a few minutes, or that goes away and comes back. It can feel like uncomfortable pressure, squeezing, fullness or pain.    Discomfort in other areas of the upper body. Symptoms can include pain or discomfort in one or both arms, the back, neck, jaw, or stomach.    Shortness of breath with or without chest discomfort.    Other signs may include breaking out in a cold sweat, nausea, or lightheadedness.  Note for women: Like men, women commonly have chest pain or discomfort as a heart attack symptom. But women are somewhat more likely than men to have other common symptoms, such as shortness of breath, nausea and vomiting, back pain, or jaw pain.  Note for older adults: Older people may also have atypical symptoms of a heart attack. These symptoms include fainting, weakness, or confusion. Ignoring these symptoms can lead to critical illness or death.  They should be investigated right away.  If you've had a heart attack: People who have had one heart attack are at risk for having another. Your provider may prescribe medicine such as nitroglycerin to take when chest pain starts. Or you may need medicines to lower your heart rate and blood pressure to prevent angina and another heart attack. Remember to take any medicines your provider has given and do not stop them without speaking with him or her first.     If you have diabetes: silent heart problems  Over time, high blood sugar can damage nerves in your body. This may keep you from feeling pain caused by a heart problem, leading to a  silent  heart problem. If you don t feel symptoms, you are less able to recognize that you may be having a heart attack and get treatment right away. Talk to your healthcare provider about how to lower your risk for silent heart problems.   Date Last Reviewed: 6/1/2016 2000-2017 [employer]. [address] [place] [address], [place] [address]. All rights reserved. This information is not intended as a substitute for professional medical care. Always follow your healthcare professional's instructions.

## 2017-06-17 NOTE — PROGRESS NOTES
[facility] Inpatient Admission Note:    Patient admitted to 3122/3122-1 at approximately 0220 via cart accompanied by nurse and daughter from emergency room . Report received from Tahira RN in SBAR format at 0150 via telephone. Patient transferred to bed via slide board.. Patient is alert and oriented X 2, denies pain; rates at 0 on 0-10 scale.  Patient oriented to room, unit, hourly rounding, and plan of care. Explained admission packet and patient handbook with patient bill of rights brochure. Will continue to monitor and document as needed.     Inpatient Nursing criteria listed below was met:      Health care directives status obtained and documented: Yes      Care Everywhere authorization obtained No      MRSA swab completed for patient 65 years and older: Yes      Patient identifies a surrogate decision maker: Yes If yes, who:valentin Pemberton Contact Information:see facesheet      Core Measure diagnosis present:No. If yes, state diagnosis: N/A       If initial lactic acid >2.0, repeat lactic acid drawn within one hour of arrival to unit: NA. If no, state reason:       Vaccination assessment and education completed: Yes   Vaccinations received prior to admission: Pneumovax yes  Influenza(seasonal)  N/A   Vaccination(s) ordered: not given today because already received      Clergy visit ordered if patient requests: N/A      Skin issues/needs documented: Yes      Isolation Patient: no Education given, correct sign in place and documentation row added to PCS:  Yes      Fall Prevention Yes: Care plan updated, education given and documented, sticker and magnet in place: Yes      Care Plan initiated: Yes      Education Documented (including assessment): Yes      Patient has discharge needs : No If yes, please explain:

## 2017-06-17 NOTE — PROGRESS NOTES
[facility][1]  Progress Note            Subjective:   The patient is sleeping this morning but awakens easily. She gives appropriate 1-2 word responses to questions but is disoriented to place and person.  She denies any chest pain, sore throat or shortness of breath.                 Medications:     Current Facility-Administered Medications Ordered in Epic   Medication Dose Route Frequency Last Rate Last Dose     glucose 40 % gel 15-30 g  15-30 g Oral Q15 Min PRN        Or     dextrose 50 % injection 25-50 mL  25-50 mL Intravenous Q15 Min PRN        Or     glucagon injection 1 mg  1 mg Subcutaneous Q15 Min PRN         lidocaine 1 % 1 mL  1 mL Other Q1H PRN         lidocaine (LMX4) kit   Topical Q1H PRN         sodium chloride (PF) 0.9% PF flush 3 mL  3 mL Intracatheter Q1H PRN         sodium chloride (PF) 0.9% PF flush 3 mL  3 mL Intracatheter Q8H   3 mL at 06/17/17 0309     HOLD Nitroglycerin IF   Does not apply HOLD         alum & mag hydroxide-simethicone (MYLANTA ES/MAALOX  ES) suspension 15-30 mL  15-30 mL Oral Q4H PRN         acetaminophen (TYLENOL) tablet 650 mg  650 mg Oral Q4H PRN         acetaminophen (TYLENOL) Suppository 650 mg  650 mg Rectal Q4H PRN         naloxone (NARCAN) injection 0.1-0.4 mg  0.1-0.4 mg Intravenous Q2 Min PRN         insulin aspart (NovoLOG) inj (RAPID ACTING)  1-7 Units Subcutaneous TID AC   2 Units at 06/17/17 0846     insulin aspart (NovoLOG) inj (RAPID ACTING)  1-5 Units Subcutaneous At Bedtime   2 Units at 06/17/17 0323     HYDROmorphone (PF) (DILAUDID) injection 0.2 mg  0.2 mg Intravenous Q2H PRN         senna-docusate (SENOKOT-S;PERICOLACE) 8.6-50 MG per tablet 1-2 tablet  1-2 tablet Oral BID   1 tablet at 06/17/17 1026     bisacodyl (DULCOLAX) EC tablet 5-15 mg  5-15 mg Oral Daily PRN         magnesium hydroxide (MILK OF MAGNESIA) suspension 30 mL  30 mL Oral Daily PRN         sodium phosphate (FLEET ENEMA) 1 enema  1 enema Rectal Once PRN         Patient is  already receiving anticoagulation with heparin, enoxaparin (LOVENOX), warfarin (COUMADIN)  or other anticoagulant medication   Does not apply Continuous PRN         0.9% sodium chloride infusion   Intravenous Continuous   Stopped at 06/17/17 0302     Continuing ACE inhibitor/ARB from home medication list OR ACE inhibitor/ARB order already placed during this visit   Does not apply DOES NOT GO TO MAR         Continuing beta blocker from home medication list OR beta blocker order already placed during this visit   Does not apply DOES NOT GO TO MAR         Continuing statin from home medication list OR statin order already placed during this visit   Does not apply DOES NOT GO TO MAR         ondansetron (ZOFRAN-ODT) ODT tab 4 mg  4 mg Oral Q6H PRN        Or     ondansetron (ZOFRAN) injection 4 mg  4 mg Intravenous Q6H PRN         prochlorperazine (COMPAZINE) injection 5 mg  5 mg Intravenous Q6H PRN        Or     prochlorperazine (COMPAZINE) tablet 5 mg  5 mg Oral Q6H PRN        Or     prochlorperazine (COMPAZINE) Suppository 12.5 mg  12.5 mg Rectal Q12H PRN         heparin  drip 25,000 units in 0.45% NaCl 250 mL (see additional administration details for dose)  0-3,500 Units/hr Intravenous Continuous   Stopped at 06/17/17 0325     heparin bolus from infusion pump   Intravenous Q6H PRN   4,500 Units at 06/17/17 1024     carvedilol (COREG) tablet 6.25 mg  6.25 mg Oral BID w/meals   6.25 mg at 06/17/17 1026     fluticasone-vilanterol (BREO ELLIPTA) 100-25 MCG/INH oral inhaler 1 puff  1 puff Inhalation Daily   1 puff at 06/17/17 0938     gabapentin (NEURONTIN) capsule 400 mg  400 mg Oral BID   400 mg at 06/17/17 1025     insulin detemir (LEVEMIR) injection 7 Units  7 Units Subcutaneous At Bedtime         mirtazapine (REMERON) tablet 30 mg  30 mg Oral At Bedtime         nitroglycerin (NITROSTAT) sublingual tablet 0.4 mg  0.4 mg Sublingual Q5 Min PRN         [START ON 6/18/2017] polyethylene glycol (MIRALAX/GLYCOLAX) Packet 17  g  17 g Oral Every Other Day         simvastatin (ZOCOR) tablet 20 mg  20 mg Oral At Bedtime         traMADol (ULTRAM) tablet 50 mg  50 mg Oral Q6H PRN         cholecalciferol (vitamin D) tablet 1,000 Units  1,000 Units Oral Daily   1,000 Units at 06/17/17 1025     aspirin EC EC tablet 81 mg  81 mg Oral Daily   81 mg at 06/17/17 1025     lisinopril (PRINIVIL/ZESTRIL) tablet 40 mg  40 mg Oral Daily         furosemide (LASIX) tablet 20 mg  20 mg Oral Daily         multivitamin (OCUVITE) tablet 1 tablet  1 tablet Oral Daily         clopidogrel (PLAVIX) tablet 75 mg  75 mg Oral Daily         hypromellose-dextran (ARTIFICAL TEARS) ophthalmic solution 1 drop  1 drop Both Eyes TID PRN         No current [facility]-ordered outpatient prescriptions on file.       Review of Systems:   As per HPI               Physical Exam:   Vitals were reviewed and are stable.  Her blood pressure was low yesterday on nitroglycerin drip, but improved off nitroglycerin.  Patient Vitals for the past 24 hrs:   BP Temp Temp src Pulse Heart Rate Resp SpO2 Weight   06/17/17 0900 - - - - - - 97 % -   06/17/17 0800 128/58 97.8  F (36.6  C) Tympanic - 70 18 99 % -   06/17/17 0700 99/62 - - - 73 16 96 % -   06/17/17 0630 132/60 - - - 76 18 100 % -   06/17/17 0600 133/61 - - - 80 - 98 % -   06/17/17 0545 - - - - - - 97 % -   06/17/17 0530 112/56 - - - 78 - 97 % -   06/17/17 0500 97/52 - - - 81 (!) 11 97 % -   06/17/17 0430 110/65 - - - 80 18 98 % -   06/17/17 0415 104/64 - - - 81 (!) 5 95 % -   06/17/17 0410 105/65 - - - 80 - 95 % -   06/17/17 0405 103/57 - - - 81 - 97 % -   06/17/17 0400 108/59 - - - 80 - 97 % -   06/17/17 0355 97/67 - - - 82 - 99 % -   06/17/17 0350 107/63 - - - 85 - 95 % -   06/17/17 0345 98/64 - - - 82 - 96 % -   06/17/17 0340 91/63 - - - 84 13 95 % -   06/17/17 0335 107/59 - - - 82 17 - -   06/17/17 0330 92/57 - - - 83 14 - -   06/17/17 0325 95/58 - - - 84 (!) 10 94 % -   06/17/17 0320 129/70 - - - 80 18 97 % -   06/17/17 0311 120/67 -  - - 85 16 95 % -   06/17/17 0226 125/65 97.3  F (36.3  C) Tympanic - 84 16 98 % -   06/17/17 0153 134/68 96  F (35.6  C) Tympanic 80 - 17 98 % 85.5 kg (188 lb 7.9 oz)   06/17/17 0150 - - - - - 13 - -   06/17/17 0149 134/68 - - - 86 14 - -   06/17/17 0140 - - - - - 18 96 % -   06/17/17 0130 - - - - - - 99 % -   06/17/17 0110 138/71 - - - 80 21 98 % -   06/17/17 0105 - - - - - 26 98 % -   06/17/17 0100 130/67 - - - 80 25 96 % -   06/17/17 0055 - - - - - 20 96 % -   06/17/17 0050 118/69 - - - 81 26 95 % -   06/17/17 0045 - - - - - 21 97 % -   06/17/17 0040 124/61 - - - 82 19 94 % -   06/17/17 0035 115/62 - - - 82 16 93 % -   06/17/17 0030 131/64 - - - 83 (!) 11 (!) 89 % -   06/17/17 0025 132/69 - - - 88 - 94 % -   06/17/17 0020 110/65 - - - 91 - - -   06/17/17 0010 97/69 - - - 86 - 96 % -   06/17/17 0004 113/67 (!) 95.7  F (35.4  C) Tympanic 90 - 18 97 % -   06/16/17 2300 - - - - - - - 89.8 kg (198 lb)     Constitutional: Awake, alert, cooperative in no acute distress.  Lungs: No increased work of breathing, no crackles or wheezing, diminished breath sounds left base.  Cardiovascular: Regular rate and rhythm without murmurs appreciated.  Abdomen: normal bowel sounds, soft, non-distended, non-tender.  Neurologic: Sleeping but awakens easily, pleasantly confused. Disoriented to place and person (daughter).    Lower Extremities: 1+ pitting pretibial edema left lower extremity.    Peripheral IV 06/17/17 Left Hand (Active)   Site Assessment WDL 6/17/2017  8:30 AM   Line Status Saline locked 6/17/2017  8:30 AM   Phlebitis Scale 0-->no symptoms 6/17/2017  3:00 AM   Infiltration Scale 0 6/17/2017  3:00 AM   Infiltration Site Treatment Method  None 6/17/2017  3:00 AM   Number of days:0       Peripheral IV 06/17/17 Right Hand (Active)   Site Assessment [facility] 6/17/2017  8:30 AM   Line Status Infusing 6/17/2017  8:30 AM   Phlebitis Scale 0-->no symptoms 6/17/2017  3:00 AM   Infiltration Scale 0 6/17/2017  3:00 AM   Infiltration Site  Treatment Method  None 6/17/2017  3:00 AM   Extravasation? No 6/17/2017  3:00 AM   Number of days:0       Rash 09/28/13 0211 Bilateral midline groin (Active)   Number of days:1358     Line/device assessment(s) completed for medical necessity         Data:     Results for orders placed or performed during the hospital encounter of 06/17/17 (from the past 24 hour(s))   CBC with platelets differential   Result Value Ref Range    WBC 5.4 4.0 - 11.0 10e9/L    RBC Count 4.37 3.8 - 5.2 10e12/L    Hemoglobin 14.3 11.7 - 15.7 g/dL    Hematocrit 41.1 35.0 - 47.0 %    MCV 94 78 - 100 fl    MCH 32.7 26.5 - 33.0 pg    MCHC 34.8 31.5 - 36.5 g/dL    RDW 13.0 10.0 - 15.0 %    Platelet Count 236 150 - 450 10e9/L    Diff Method Automated Method     % Neutrophils 55.3 %    % Lymphocytes 33.4 %    % Monocytes 9.0 %    % Eosinophils 1.5 %    % Basophils 0.4 %    % Immature Granulocytes 0.4 %    Nucleated RBCs 0 0 /100    Absolute Neutrophil 3.0 1.6 - 8.3 10e9/L    Absolute Lymphocytes 1.8 0.8 - 5.3 10e9/L    Absolute Monocytes 0.5 0.0 - 1.3 10e9/L    Absolute Eosinophils 0.1 0.0 - 0.7 10e9/L    Absolute Basophils 0.0 0.0 - 0.2 10e9/L    Abs Immature Granulocytes 0.0 0 - 0.4 10e9/L    Absolute Nucleated RBC 0.0    INR   Result Value Ref Range    INR 1.01 0.80 - 1.20   Partial thromboplastin time   Result Value Ref Range    PTT 24 24.00 - 37.00 sec   Comprehensive metabolic panel   Result Value Ref Range    Sodium 130 (L) 133 - 144 mmol/L    Potassium 4.8 3.4 - 5.3 mmol/L    Chloride 92 (L) 94 - 109 mmol/L    Carbon Dioxide 26 20 - 32 mmol/L    Anion Gap 12 3 - 14 mmol/L    Glucose 269 (H) 70 - 99 mg/dL    Urea Nitrogen 22 7 - 30 mg/dL    Creatinine 1.64 (H) 0.52 - 1.04 mg/dL    GFR Estimate 30 (L) >60 mL/min/1.7m2    GFR Estimate If Black 36 (L) >60 mL/min/1.7m2    Calcium 8.7 8.5 - 10.1 mg/dL    Bilirubin Total 0.4 0.2 - 1.3 mg/dL    Albumin 3.0 (L) 3.4 - 5.0 g/dL    Protein Total 7.7 6.8 - 8.8 g/dL    Alkaline Phosphatase 98 40 - 150  U/L    ALT 40 0 - 50 U/L    AST 51 (H) 0 - 45 U/L   Troponin I   Result Value Ref Range    Troponin I ES 0.052 (H) 0.000 - 0.045 ug/L   UA with Microscopic reflex to Culture   Result Value Ref Range    Color Urine Yellow     Appearance Urine Clear     Glucose Urine 70 (A) NEG mg/dL    Bilirubin Urine Negative NEG    Ketones Urine Negative NEG mg/dL    Specific Gravity Urine 1.006 1.003 - 1.035    Blood Urine Negative NEG    pH Urine 6.5 4.7 - 8.0 pH    Protein Albumin Urine Negative NEG mg/dL    Urobilinogen mg/dL Normal 0.0 - 2.0 mg/dL    Nitrite Urine Negative NEG    Leukocyte Esterase Urine Negative NEG    Source Catheterized Urine     WBC Urine <1 0 - 2 /HPF    RBC Urine <1 0 - 2 /HPF    Bacteria Urine None (A) NEG /HPF    Mucous Urine Present (A) NEG /LPF    Hyaline Casts 1 (A) 0 - 2 /LPF   Active MRSA Surveillance Culture   Result Value Ref Range    Specimen Description Swab     Culture Micro Culture in progress     Micro Report Status Pending    Glucose by meter   Result Value Ref Range    Glucose 257 (H) 70 - 99 mg/dL   Basic metabolic panel   Result Value Ref Range    Sodium 133 133 - 144 mmol/L    Potassium 4.5 3.4 - 5.3 mmol/L    Chloride 97 94 - 109 mmol/L    Carbon Dioxide 28 20 - 32 mmol/L    Anion Gap 8 3 - 14 mmol/L    Glucose 220 (H) 70 - 99 mg/dL    Urea Nitrogen 20 7 - 30 mg/dL    Creatinine 1.38 (H) 0.52 - 1.04 mg/dL    GFR Estimate 36 (L) >60 mL/min/1.7m2    GFR Estimate If Black 44 (L) >60 mL/min/1.7m2    Calcium 8.0 (L) 8.5 - 10.1 mg/dL   CBC with platelets differential   Result Value Ref Range    WBC 4.1 4.0 - 11.0 10e9/L    RBC Count 4.06 3.8 - 5.2 10e12/L    Hemoglobin 13.3 11.7 - 15.7 g/dL    Hematocrit 39.6 35.0 - 47.0 %    MCV 98 78 - 100 fl    MCH 32.8 26.5 - 33.0 pg    MCHC 33.6 31.5 - 36.5 g/dL    RDW 13.2 10.0 - 15.0 %    Platelet Count 145 (L) 150 - 450 10e9/L    Diff Method Automated Method     % Neutrophils 52.8 %    % Lymphocytes 36.0 %    % Monocytes 9.1 %    % Eosinophils 1.7 %     % Basophils 0.2 %    % Immature Granulocytes 0.2 %    Nucleated RBCs 0 0 /100    Absolute Neutrophil 2.1 1.6 - 8.3 10e9/L    Absolute Lymphocytes 1.5 0.8 - 5.3 10e9/L    Absolute Monocytes 0.4 0.0 - 1.3 10e9/L    Absolute Eosinophils 0.1 0.0 - 0.7 10e9/L    Absolute Basophils 0.0 0.0 - 0.2 10e9/L    Abs Immature Granulocytes 0.0 0 - 0.4 10e9/L    Absolute Nucleated RBC 0.0    Troponin I   Result Value Ref Range    Troponin I ES 0.224 (HH) 0.000 - 0.045 ug/L   Nt probnp inpatient   Result Value Ref Range    N-Terminal Pro BNP Inpatient 788 0 - 1800 pg/mL   Hemoglobin A1c   Result Value Ref Range    Hemoglobin A1C 9.7 (H) 4.3 - 6.0 %   Estimated Average Glucose   Result Value Ref Range    Estimated Average Glucose 232 mg/dL   Glucose by meter   Result Value Ref Range    Glucose 227 (H) 70 - 99 mg/dL   Heparin Xa (10a) Level   Result Value Ref Range    Heparin 10A Level 0.93 IU/mL     All cardiac studies reviewed by me - EKG this AM reveals ongoing ST elevation in inferior leads, improved from admission  All imaging studies reviewed by me - CXR this AM reveals blunting of the left base c/w effusion (new vs admission CXR)         Assessment and Plan:   Active Problems:    Acute transmural inferior wall MI (H) - hemodynamically stable, develops hypotension with NTG, restart low dose ACE inhibitor, continue Coreg, ASA, heparin. The patient has a history of intracranial bleeding - stop Brillinta (increased risk intracranial hemorrhage) and switch to plavix.  Continue to monitor and follow troponin, clinically.  Evidence CHF this morning with small pleural effusion, increased weight. Decrease iv fluids, restart home lasix dose, monitor weights, oxygen sat.  Echo on Monday when available assess for wall motion abnormalities, EF.    Acute Renal Insufficiency - improving with iv fluids, continue to monitor.    Diabetes Mellitus - stable blood sugars at present. Continue insulin, continue to monitor.    Hyponatremia on  admission - resolved.    Recent Left Lower Lobe Pneumonia with hospital stay 6/2 - 6/5 - no infiltrate noted on admission CXR - currently without fever or leukocytosis - monitor and check follow up CXR.    Recent UTI - urine clear on admission urinalysis.    Essential Hypertension - currently holding Amlodipine secondary to MI with hypotension.    Dementia - monitor.    Deconditioning with frequent falls and weakness - will need outpatient PT/OT - hold at present secondary to acute MI.    DNR/DNI per discussion with patient's family.

## 2017-06-17 NOTE — PROVIDER NOTIFICATION
DATE:  6/17/2017   TIME OF RECEIPT FROM LAB:  1650  LAB TEST:  Hep10A  LAB VALUE:  1.9  RESULTS GIVEN WITH READ-BACK TO (PROVIDER):  Dr. Franco  TIME LAB VALUE REPORTED TO PROVIDER:   1703

## 2017-06-17 NOTE — PLAN OF CARE
Problem: Patient Goal: Social Work Focus  Goal: 1. Patient Goal: Social Work Focus  This is a 88 year old woman who is a DNR/DNI who wishes to be kept comfortable after a MI.  She is residing at [facility].  She was here at the hospital on 6-2 where after her stay from the assisted living she went to a skilled facility.  She has a very supportive system from hr family.  She needs help with her adl's, bathing, and reminders when participating in her therapy.  I did meet with her daughter and son-in-law.  They have emptied her room out at [facility] and do not plan to return there.  I did Per discharge planning, the choice of vendor list has been provided to the patient/family for snf    First Choice :     Second Choice:     Third Choice:   The family is not going to decide at this time which facilities they are going to choose but wait until Monday when they are all together to determine where they would like mom to go.  We did discuss the option of a hospice consult just to see if this patient may fit the screens of a hospice level patient care.  Plan to complete a family meeting on Monday and continue planning for patient.

## 2017-06-17 NOTE — LETTER
"Transition Communication Hand-off for Care Transitions to Next Level of Care Provider    Name: Louise Johnson  MRN #: [id number]  Primary Care Provider: Boston Cagle  Primary Care MD Name: Dr Cagle  Primary Clinic: [facility] [address]  [facility] [place] [address]  Primary Care Clinic Name: [facility]  Reason for Hospitalization:  Acute myocardial infarction, initial episode of care (H) [I21.3]  STEMI (ST elevation myocardial infarction) (H) [I21.3]  Admit Date/Time: 6/17/2017 12:08 AM  Discharge Date: ***  Payor Source: Payor: BCBS / Plan: BCBS PLATINUM BLUE / Product Type: PPO /     Readmission Assessment Measure (JUSTIN) Risk Score/category: ***    Plan of Care Goals/Milestone Events:   Patient Concerns: ***   Patient Goals:   Short-term ***   Long-term ***   Medical Goals   Short-term ***   Long-term ***         Reason for Communication Hand-off Referral: {CCREASONCTNHANDOFFREFERRALCTS:[id number]}    Discharge Plan:       Concern for non-adherence with plan of care:   Y/N ***  Discharge Needs Assessment:  Needs       Most Recent Value    Equipment Currently Used at Home wheelchair    Transportation Available family or friend will provide, van, wheelchair accessible    Other Resources -- [n/a]    PAS Number [id number]          Already enrolled in Tele-monitoring program and name of program:  ***  Follow-up specialty is recommended: {YES / NO:[id number]::\"Yes\"}    Follow-up plan:  No future appointments.    Any outstanding tests or procedures:        Referrals     Future Labs/Procedures    HOSPICE REFERRAL     Comments:    **Order classes of: [facility], [facility] and [facility] will route to the Home Care and Hospice Referral Pool.  Home Care or Hospice will then contact the patient to schedule their appointment.**    If you do not hear from Home Care and Hospice, or you would like to call to schedule, please call the referring place of service that your provider has listed " below.  ______________________________________________________________________    Your provider has referred you to: [facility] [place] [phone]   [email or web address]    Extended Emergency Contact Information  Primary Emergency Contact: TADEO JOHNSON   [facility]  Home Phone: [phone]  Mobile Phone: [phone]  Relation: Son  Secondary Emergency Contact: VIJI BUCKLEY   [facility]  Home Phone: [phone]  Mobile Phone: [phone]  Relation: Daughter    Patient Anticipated Discharge Date: 6/20/17   RN, PT, HHA to begin 24 - 48 hours after discharge.  PLEASE EVALUATE AND TREAT (Evaluation timeline is 24 - 48 hrs. Please call if there is need for a variance to this timeline).    REASON FOR REFERRAL: Assessment & Treatment: RN    ADDITIONAL SERVICES NEEDED: end of life    OTHER PERTINENT INFORMATION: Patient was last seen by provider on 6/20/17 for CAD.    No current outpatient prescriptions on file.    Patient Active Problem List:     Small bowel obstruction (H)     SOB (shortness of breath)     Pulmonary edema     ICB (intracranial bleed) (H)     Abdominal pain, generalized     Constipation     Abdominal pain     Anemia     Chronic coronary artery disease     Congestive heart failure (H)     Diabetes mellitus (H)     Gastrointestinal hemorrhage     Hypertension     History of gastrointestinal disease     Pulmonary hypertension (H)     Right rib fracture     Disorientation     Falls frequently     Closed fracture of multiple ribs of right side with routine healing     Advance care planning     Pneumonia of left lower lobe due to infectious organism     General weakness     Pneumonia     Acute lower urinary tract infection     Renal insufficiency     Acute transmural inferior wall MI (H)      Documentation of Face to Face and Certification for Home Health Services    I certify that patient, Louise Johnson is under my care and that I,  or a Nurse Practitioner or Physician's Assistant working with me, had a face-to-face encounter that meets the physician face-to-face encounter requirements with this patient on: 6/20/2017.    This encounter with the patient was in whole, or in part, for the following medical condition, which is the primary reason for Home Health Care: CAD.    I certify that, based on my findings, the following services are medically necessary Home Health Services: Nursing    My clinical findings support the need for the above services because: Nurse is needed: help pt and family through any needs that will arise from condition.    Further, I certify that my clinical findings support that this patient is homebound (i.e. absences from home require considerable and taxing effort and are for medical reasons or [employer] services or infrequently or of short duration when for other reasons) because: Mental health symptoms including: Mental health condition is exacerbated by exposure to crowds, unfamiliar environment or new stressful situations..    Based on the above findings, I certify that this patient is confined to the home and needs intermittent skilled nursing care, physical therapy and/or speech therapy.  The patient is under my care, and I have initiated the establishment of the plan of care.  This patient will be followed by a physician who will periodically review the plan of care.    Physician/Provider to provide follow up care: Boston Cagle certified Physician at time of discharge: ismael    Please be aware that coverage of these services is subject to the terms and limitations of your health insurance plan.  Call member services at your health plan with any benefit or coverage questions.            Key Recommendations:      Amberly Thompson    AVS/Discharge Summary is the source of truth; this is a helpful guide for improved communication of patient story

## 2017-06-17 NOTE — PROVIDER NOTIFICATION
DATE:  6/17/2017   TIME OF RECEIPT FROM LAB:  0849  LAB TEST:  troponin  LAB VALUE:  0.224  RESULTS GIVEN WITH READ-BACK TO (PROVIDER):  Francesca Franco MD  TIME LAB VALUE REPORTED TO PROVIDER:   0.224

## 2017-06-17 NOTE — IP AVS SNAPSHOT
MRN:[id number]                      After Visit Summary   6/17/2017    Louise Johnson    MRN: [id number]           Thank you!     Thank you for choosing [facility] for your care. Our goal is always to provide you with excellent care. Hearing back from our patients is one way we can continue to improve our services. Please take a few minutes to complete the written survey that you may receive in the mail after you visit with us. Thank you!        Patient Information     Date Of Birth          4/16/1929        About your hospital stay     You were admitted on:  June 17, 2017 You last received care in the:  [facility]    You were discharged on:  June 20, 2017       Who to Call     For medical emergencies, please call 911.  For non-urgent questions about your medical care, please call your primary care provider or clinic, [phone]          Attending Provider     Provider Specialty    Francesca Franco MD [facility]    Boston Cagle DO [facility]       Primary Care Provider Office Phone # Fax #    Boston Cagle [phone]      After Care Instructions     Activity - Up ad jian           Advance Diet as Tolerated       Follow this diet upon discharge: Regular            General info for SNF       Length of Stay Estimate: Long Term Care  Condition at Discharge: Stable  Level of care:skilled   Rehabilitation Potential: Fair  Admission H&P remains valid and up-to-date: Yes  Recent Chemotherapy: N/A  Use Nursing Home Standing Orders: Yes            Glucose monitor nursing POCT       Before meals and at bedtime            Mantoux instructions       Give two-step Mantoux (PPD) Per Facility Policy Yes                  Additional Services     HOSPICE REFERRAL       **Order classes of: [facility], [facility] and [facility] will route to the Home Care and Hospice Referral Pool.  Home Care or Hospice will then contact the patient to schedule their  appointment.**    If you do not hear from Home Care and Hospice, or you would like to call to schedule, please call the referring place of service that your provider has listed below.  ______________________________________________________________________    Your provider has referred you to: [facility] - [place] [phone]   [email or web address]    Extended Emergency Contact Information  Primary Emergency Contact: TADEO ALCANTAR   [facility]  Home Phone: [phone]  Mobile Phone: [phone]  Relation: Son  Secondary Emergency Contact: VIJI BUCKLEY   [facility]  Home Phone: [phone]  Mobile Phone: [phone]  Relation: Daughter    Patient Anticipated Discharge Date: 6/20/17   RN, PT, HHA to begin 24 - 48 hours after discharge.  PLEASE EVALUATE AND TREAT (Evaluation timeline is 24 - 48 hrs. Please call if there is need for a variance to this timeline).    REASON FOR REFERRAL: Assessment & Treatment: RN    ADDITIONAL SERVICES NEEDED: end of life    OTHER PERTINENT INFORMATION: Patient was last seen by provider on 6/20/17 for CAD.    No current outpatient prescriptions on file.    Patient Active Problem List:     Small bowel obstruction (H)     SOB (shortness of breath)     Pulmonary edema     ICB (intracranial bleed) (H)     Abdominal pain, generalized     Constipation     Abdominal pain     Anemia     Chronic coronary artery disease     Congestive heart failure (H)     Diabetes mellitus (H)     Gastrointestinal hemorrhage     Hypertension     History of gastrointestinal disease     Pulmonary hypertension (H)     Right rib fracture     Disorientation     Falls frequently     Closed fracture of multiple ribs of right side with routine healing     Advance care planning     Pneumonia of left lower lobe due to infectious organism     General weakness     Pneumonia     Acute lower urinary tract infection     Renal insufficiency     Acute  transmural inferior wall MI (H)      Documentation of Face to Face and Certification for Home Health Services    I certify that patient, Louise Johnson is under my care and that I, or a Nurse Practitioner or Physician's Assistant working with me, had a face-to-face encounter that meets the physician face-to-face encounter requirements with this patient on: 6/20/2017.    This encounter with the patient was in whole, or in part, for the following medical condition, which is the primary reason for Home Health Care: CAD.    I certify that, based on my findings, the following services are medically necessary Home Health Services: Nursing    My clinical findings support the need for the above services because: Nurse is needed: help pt and family through any needs that will arise from condition.    Further, I certify that my clinical findings support that this patient is homebound (i.e. absences from home require considerable and taxing effort and are for medical reasons or [employer] services or infrequently or of short duration when for other reasons) because: Mental health symptoms including: Mental health condition is exacerbated by exposure to crowds, unfamiliar environment or new stressful situations..    Based on the above findings, I certify that this patient is confined to the home and needs intermittent skilled nursing care, physical therapy and/or speech therapy.  The patient is under my care, and I have initiated the establishment of the plan of care.  This patient will be followed by a physician who will periodically review the plan of care.    Physician/Provider to provide follow up care: Boston Cagle certified Physician at time of discharge: ismael    Please be aware that coverage of these services is subject to the terms and limitations of your health insurance plan.  Call member services at your health plan with any benefit or coverage questions.                  Further  instructions from your care team         Recognizing a Heart Attack or Angina  If you have risk factors for heart problems, you should always watch for signs of angina or a heart attack. If you have a sudden heart problem, getting treatment right away could save your life.   Risk factors for a heart attack include advanced age, high cholesterol, high blood pressure, having a family member who had a heart attack before the age of 50, diabetes, smoking, and stress. There are other risk factors including eating a high-fat diet and getting minimal exercise.  Understanding angina and heart attack    Angina is a painful burning, tightness, or pressure in the chest, back, neck, throat, or jaw. It means not enough blood is getting to the heart. This is usually from a blocked artery in the heart. Angina is a sign that you may be having, or are about to have, a heart attack. It needs to be addressed by a healthcare provider right away.    A heart attack, also known as acute myocardial infarction, or AMI, is what happens when blood can't get to part of the heart muscle. That part of the heart muscle is damaged and starts to die. If enough of the heart is affected, it will severely reduce its ability to provide blood to the rest of the body. It may cause death. It is vital to get help as soon as possible for a heart attack.  Stable angina versus unstable angina  Stable angina, also known as chronic angina, has a typical pattern. It occurs predictably with physical exertion or strong emotion. Nitroglycerin, rest, or both, will easily relieve stable angina symptoms. Stable angina symptoms will most likely feel the same each time you have them. It is important to discuss these symptoms with your healthcare provider. They can be a warning sign for a future heart attack.  Unstable angina causes unexpected or unpredictable symptoms, commonly occurring at rest, and is a medical emergency. Angina is also considered unstable if resting  and nitroglycerin doesn't relieve symptoms, It's also unstable if symptoms are worsening, occurring more often, or are lasting longer. These symptoms suggest a severe blockage or a spasm of a heart artery. Unstable angina is commonly a sign of an active heart attack. Remember the following tips:    Stable angina symptoms should go away with rest or medicine. If they don t go away, call 911!    Stable angina symptoms last for only a few minutes. If they last longer than that, or if they go away and come back, you may be having a heart attack. Call 911!    If you have shortness of breath, cold sweat, nausea, or lightheadedness, call 911!  For new-onset angina, there is only one response: ?call 911! You should never diagnose angina by yourself. If these symptoms are new, or worse than usual, call 911!  Warning signs of a heart attack  If you have symptoms that you can t explain, call 911 right away. Do not drive yourself to the emergency room. The following are warning signs of a possible heart attack:    Chest discomfort. Most heart attacks involve discomfort in the center of the chest that lasts more than a few minutes, or that goes away and comes back. It can feel like uncomfortable pressure, squeezing, fullness or pain.    Discomfort in other areas of the upper body. Symptoms can include pain or discomfort in one or both arms, the back, neck, jaw, or stomach.    Shortness of breath with or without chest discomfort.    Other signs may include breaking out in a cold sweat, nausea, or lightheadedness.  Note for women: Like men, women commonly have chest pain or discomfort as a heart attack symptom. But women are somewhat more likely than men to have other common symptoms, such as shortness of breath, nausea and vomiting, back pain, or jaw pain.  Note for older adults: Older people may also have atypical symptoms of a heart attack. These symptoms include fainting, weakness, or confusion. Ignoring these symptoms can  lead to critical illness or death. They should be investigated right away.  If you've had a heart attack: People who have had one heart attack are at risk for having another. Your provider may prescribe medicine such as nitroglycerin to take when chest pain starts. Or you may need medicines to lower your heart rate and blood pressure to prevent angina and another heart attack. Remember to take any medicines your provider has given and do not stop them without speaking with him or her first.     If you have diabetes: silent heart problems  Over time, high blood sugar can damage nerves in your body. This may keep you from feeling pain caused by a heart problem, leading to a  silent  heart problem. If you don t feel symptoms, you are less able to recognize that you may be having a heart attack and get treatment right away. Talk to your healthcare provider about how to lower your risk for silent heart problems.   Date Last Reviewed: 6/1/2016 2000-2017 [employer]. [address] [place] [address]. All rights reserved. This information is not intended as a substitute for professional medical care. Always follow your healthcare professional's instructions.          Pending Results     Date and Time Order Name Status Description    6/19/2017 0615 Echocardiogram Complete Preliminary             Statement of Approval     Ordered          06/20/17 0734  I have reviewed and agree with all the recommendations and orders detailed in this document.  EFFECTIVE NOW     Approved and electronically signed by:  Boston Cagle DO             Admission Information     Date & Time Provider Department Dept. Phone    6/17/2017 Boston Cagle,  [facility] [phone]      Your Vitals Were     Blood Pressure Pulse Temperature Respirations Weight Pulse Oximetry    126/60 (BP Location: Left arm) 78 97.9  F (36.6  C) (Tympanic) 18 85.5 kg (188 lb 7.9 oz) 96%    BMI (Body Mass Index)              "      33.39 kg/m2           [employer] Information     [employer] lets you send messages to your doctor, view your test results, renew your prescriptions, schedule appointments and more. To sign up, go to [email or web address] . Click on \"Log in\" on the left side of the screen, which will take you to the Welcome page. Then click on \"Sign up Now\" on the right side of the page.     You will be asked to enter the access code listed below, as well as some personal information. Please follow the directions to create your username and password.     Your access code is: [email or web address]  Expires: 2017 10:28 AM     Your access code will  in 90 days. If you need help or a new code, please call your [facility] clinic or [phone].        Care EveryWhere ID     This is your Care EveryWhere ID. This could be used by other organizations to access your [facility] medical records  [id number]           Review of your medicines      CONTINUE these medicines which have NOT CHANGED        Dose / Directions    ASPIRIN ADULT LOW STRENGTH PO   Indication:  ecotrim        Dose:  81 mg   Take 81 mg by mouth daily   Refills:  0       carboxymethylcellulose 0.5 % Soln ophthalmic solution   Commonly known as:  REFRESH PLUS        Dose:  1 drop   Place 1 drop into both eyes daily as needed for dry eyes   Refills:  0       CARVEDILOL PO        Dose:  6.25 mg   Take 6.25 mg by mouth 2 times daily (with meals)   Refills:  0       fluticasone-salmeterol 250-50 MCG/DOSE diskus inhaler   Commonly known as:  ADVAIR        Dose:  1 puff   Inhale 1 puff into the lungs 2 times daily   Refills:  0       GABAPENTIN PO        Dose:  400 mg   Take 400 mg by mouth 2 times daily   Refills:  0       LASIX PO        Dose:  40 mg   Take 40 mg by mouth daily   Refills:  0       LEVEMIR SC        Dose:  7 Units   Inject 7 Units Subcutaneous At Bedtime   Refills:  0       LISINOPRIL PO        Dose:  40 mg   Take 40 mg by mouth daily   Refills:  0       " MIRTAZAPINE PO        Dose:  30 mg   Take 30 mg by mouth At Bedtime   Refills:  0       multivitamin Tabs tablet        Dose:  1 tablet   Take 1 tablet by mouth 2 times daily   Refills:  0       NITROGLYCERIN SL        Dose:  0.4 mg   Place 0.4 mg under the tongue every 5 minutes as needed for chest pain x3 prn   Refills:  0       OXYBUTYNIN CHLORIDE PO        Dose:  10 mg   Take 10 mg by mouth At Bedtime   Refills:  0       polyethylene glycol powder   Commonly known as:  MIRALAX/GLYCOLAX        Dose:  1 capful   Take 1 capful by mouth every other day   Refills:  0       * TRAMADOL HCL PO        Dose:  50 mg   Take 50 mg by mouth daily   Refills:  0       * TRAMADOL HCL PO        Dose:  50 mg   Take 50 mg by mouth every 6 hours as needed for moderate to severe pain   Refills:  0       TYLENOL PO        Dose:  500 mg   Take 500 mg by mouth every 6 hours as needed for mild pain or fever   Refills:  0       VITAMIN D (CHOLECALCIFEROL) PO        Dose:  1000 Units   Take 1,000 Units by mouth daily   Refills:  0       ZOCOR PO        Dose:  20 mg   Take 20 mg by mouth At Bedtime   Refills:  0       * Notice:  This list has 2 medication(s) that are the same as other medications prescribed for you. Read the directions carefully, and ask your doctor or other care provider to review them with you.      STOP taking     amLODIPine 5 MG tablet   Commonly known as:  NORVASC           NONFORMULARY           terbinafine 250 MG tablet   Commonly known as:  lamISIL                    Protect others around you: Learn how to safely use, store and throw away your medicines at www.disposemymeds.org.             Medication List: This is a list of all your medications and when to take them. Check marks below indicate your daily home schedule. Keep this list as a reference.      Medications           Morning Afternoon Evening Bedtime As Needed    ASPIRIN ADULT LOW STRENGTH PO   Take 81 mg by mouth daily   Last time this was given:  81 mg  on 6/19/2017 10:49 AM                                carboxymethylcellulose 0.5 % Soln ophthalmic solution   Commonly known as:  REFRESH PLUS   Place 1 drop into both eyes daily as needed for dry eyes                                CARVEDILOL PO   Take 6.25 mg by mouth 2 times daily (with meals)   Last time this was given:  6.25 mg on 6/19/2017  6:59 PM                                fluticasone-salmeterol 250-50 MCG/DOSE diskus inhaler   Commonly known as:  ADVAIR   Inhale 1 puff into the lungs 2 times daily                                GABAPENTIN PO   Take 400 mg by mouth 2 times daily   Last time this was given:  400 mg on 6/17/2017  9:12 PM                                LASIX PO   Take 40 mg by mouth daily   Last time this was given:  40 mg on 6/19/2017 10:51 AM                                LEVEMIR SC   Inject 7 Units Subcutaneous At Bedtime                                LISINOPRIL PO   Take 40 mg by mouth daily   Last time this was given:  40 mg on 6/19/2017 10:49 AM                                MIRTAZAPINE PO   Take 30 mg by mouth At Bedtime   Last time this was given:  30 mg on 6/17/2017  9:12 PM                                multivitamin Tabs tablet   Take 1 tablet by mouth 2 times daily   Last time this was given:  1 tablet on 6/17/2017 12:00 PM                                NITROGLYCERIN SL   Place 0.4 mg under the tongue every 5 minutes as needed for chest pain x3 prn   Last time this was given:  0.4 mg on 6/17/2017  4:42 PM                                OXYBUTYNIN CHLORIDE PO   Take 10 mg by mouth At Bedtime                                polyethylene glycol powder   Commonly known as:  MIRALAX/GLYCOLAX   Take 1 capful by mouth every other day                                * TRAMADOL HCL PO   Take 50 mg by mouth daily   Last time this was given:  50 mg on 6/20/2017  7:56 AM                                * TRAMADOL HCL PO   Take 50 mg by mouth every 6 hours as needed for moderate to severe  pain   Last time this was given:  50 mg on 6/20/2017  7:56 AM                                TYLENOL PO   Take 500 mg by mouth every 6 hours as needed for mild pain or fever                                VITAMIN D (CHOLECALCIFEROL) PO   Take 1,000 Units by mouth daily   Last time this was given:  1,000 Units on 6/19/2017 10:52 AM                                ZOCOR PO   Take 20 mg by mouth At Bedtime                                * Notice:  This list has 2 medication(s) that are the same as other medications prescribed for you. Read the directions carefully, and ask your doctor or other care provider to review them with you.

## 2017-06-17 NOTE — IP AVS SNAPSHOT
[facility]    [address] [place] [id number]    Phone:  [phone]    Fax:  [phone]                                       After Visit Summary   6/17/2017    Louise Johnson    MRN: [id number]           After Visit Summary Signature Page     I have received my discharge instructions, and my questions have been answered. I have discussed any challenges I see with this plan with the nurse or doctor.    ..........................................................................................................................................  Patient/Patient Representative Signature      ..........................................................................................................................................  Patient Representative Print Name and Relationship to Patient    ..................................................               ................................................  Date                                            Time    ..........................................................................................................................................  Reviewed by Signature/Title    ...................................................              ..............................................  Date                                                            Time

## 2017-06-17 NOTE — PROVIDER NOTIFICATION
Dr. Franco was paged to update patients status, at 1625 patient started complaining of chest pain and shortness of breath.  Patient was given oxygen at 2L/NC and was given ntg sl twice and has a drop in BP, patient was given a third dose of ntg sl with some relief of pain, Dr. Franco then came to see patient and BP was improved so a nitro drip was started and titrated up until patient was comfortable.  Pressures have remain stable and patient is pain free and resting comfortably.

## 2017-06-17 NOTE — Clinical Note
Admitting Physician: GHARAIBEH, KAMEL ABDEL RAHMAN [[id number]]   Clinical Service: ICU CARDIAC TEAM ([facility]) [[id number]]

## 2017-06-17 NOTE — PROGRESS NOTES
Called to see patient re recurrent chest pain.  The pain was partially relieved with sl NTG but recurred off the medication and systolic blood pressure dropped into the 90s.  EKG revealed worsening ST elevation in the inferior leads with reciprocal changes in the lateral leads and ST depression in V1 and V2.  The patient was started on a low dose nitroglycerin drip with relief of pain.  Her blood pressure remained in the 90s systolic. She currently is awake and answers questions appropriately.  Heart is regular without murmurs appreciated.  Lung have few rales at the left base (small left pleural effusion noted on CXR this AM) but are otherwise clear.  Respirations unlabored on 2L nasal cannula oxygen. Pulse is in the 80s. Repeat troponin continues to rise (over 0.8).  Impression is inferior wall MI (STEMI) with ongoing ischemia.  Spoke with the patient's daughter, Francesca, re concerns for worsening condition with ongoing ischemia.  She states her mother definitely does not want transfer to [place] for cath/angioplasty, but ongoing medical management.  Maximize care with continued heparin, iv nitroglycerin as tolerated for pain relief, coreg, lisinopril, high dose statin and PPI as protection for GI bleed on combination ASA, Plavix and heparin in the setting of acute illness.

## 2017-06-17 NOTE — PLAN OF CARE
Problem: Goal Outcome Summary  Goal: Goal Outcome Summary  Outcome: No Change  VSS.  Afebrile.  Denies pain.  Oxygen sats high 90's on room air.  Patient complains of feeling tired.  Eating well.  Has been resting in bed most of shift.  Sat up in bed for breakfast, lunch and to take medications.

## 2017-06-17 NOTE — PLAN OF CARE
Problem: Goal Outcome Summary  Goal: Goal Outcome Summary  Outcome: Improving  Pt admitted to floor at 0220 after having CP while at [facility] in [place].  ST elevation detected and STEMI called. Pt decided she did not want to be transferred and did not want aggressive treatment, admitted to floor. VSS. Heparin gtt running at 1000 units an hour upon admit. Straight cath for UA per Dr. Franco. Nitro gtt started, BP started to drop into the 90's/50's. Dr. Franco notified, stopped nitro at 0330. Vital signs otherwise stable.  Pt very confused and repeatedly asking where she is and how she got here. Has denied pain since arriving to floor.  Daughter Monique at bedside.  Both IV's WDL.

## 2017-06-17 NOTE — ED NOTES
Pt presents with [facility] with onset of DIfficulty breathing and chest pain approx. 2330. Transmitted EMS EKG showed STEMI Inferior Leads. Dr. Diaz was notified and STEMI Alert called with 6 min. ETA.

## 2017-06-18 NOTE — PROGRESS NOTES
[facility]  Progress Note            Subjective:   The patient had chest pain yesterday afternoon and overnight, controlled with iv nitroglycerin (limited partially by hypotension) and iv dilaudid.  She is sleeping this morning, but wakes easily. She is confused, disoriented to place but oriented to person. She currently denies any chest pain or shortness of breath.                 Medications:     Current Facility-Administered Medications Ordered in Epic   Medication Dose Route Frequency Last Rate Last Dose     metoprolol (LOPRESSOR) injection 2.5 mg  2.5 mg Intravenous Q8H         enalaprilat (VASOTEC) injection 1.25 mg  1.25 mg Intravenous Q6H         pantoprazole (PROTONIX) 40 mg IV push injection  40 mg Intravenous QAM AC         furosemide (LASIX) injection 10 mg  10 mg Intravenous Once         glucose 40 % gel 15-30 g  15-30 g Oral Q15 Min PRN        Or     dextrose 50 % injection 25-50 mL  25-50 mL Intravenous Q15 Min PRN        Or     glucagon injection 1 mg  1 mg Subcutaneous Q15 Min PRN         lidocaine 1 % 1 mL  1 mL Other Q1H PRN         lidocaine (LMX4) kit   Topical Q1H PRN         sodium chloride (PF) 0.9% PF flush 3 mL  3 mL Intracatheter Q1H PRN         sodium chloride (PF) 0.9% PF flush 3 mL  3 mL Intracatheter Q8H   3 mL at 06/17/17 0309     HOLD Nitroglycerin IF   Does not apply HOLD         alum & mag hydroxide-simethicone (MYLANTA ES/MAALOX  ES) suspension 15-30 mL  15-30 mL Oral Q4H PRN         acetaminophen (TYLENOL) tablet 650 mg  650 mg Oral Q4H PRN         acetaminophen (TYLENOL) Suppository 650 mg  650 mg Rectal Q4H PRN         naloxone (NARCAN) injection 0.1-0.4 mg  0.1-0.4 mg Intravenous Q2 Min PRN         insulin aspart (NovoLOG) inj (RAPID ACTING)  1-7 Units Subcutaneous TID AC   2 Units at 06/17/17 1831     insulin aspart (NovoLOG) inj (RAPID ACTING)  1-5 Units Subcutaneous At Bedtime   1 Units at 06/17/17 2114     HYDROmorphone (PF) (DILAUDID) injection 0.2 mg  0.2 mg  Intravenous Q2H PRN   0.2 mg at 06/18/17 0657     senna-docusate (SENOKOT-S;PERICOLACE) 8.6-50 MG per tablet 1-2 tablet  1-2 tablet Oral BID   1 tablet at 06/17/17 2113     bisacodyl (DULCOLAX) EC tablet 5-15 mg  5-15 mg Oral Daily PRN         magnesium hydroxide (MILK OF MAGNESIA) suspension 30 mL  30 mL Oral Daily PRN         sodium phosphate (FLEET ENEMA) 1 enema  1 enema Rectal Once PRN         Patient is already receiving anticoagulation with heparin, enoxaparin (LOVENOX), warfarin (COUMADIN)  or other anticoagulant medication   Does not apply Continuous PRN         0.9% sodium chloride infusion   Intravenous Continuous   Stopped at 06/17/17 0302     Continuing ACE inhibitor/ARB from home medication list OR ACE inhibitor/ARB order already placed during this visit   Does not apply DOES NOT GO TO MAR         Continuing beta blocker from home medication list OR beta blocker order already placed during this visit   Does not apply DOES NOT GO TO MAR         Continuing statin from home medication list OR statin order already placed during this visit   Does not apply DOES NOT GO TO MAR         ondansetron (ZOFRAN-ODT) ODT tab 4 mg  4 mg Oral Q6H PRN        Or     ondansetron (ZOFRAN) injection 4 mg  4 mg Intravenous Q6H PRN         prochlorperazine (COMPAZINE) injection 5 mg  5 mg Intravenous Q6H PRN        Or     prochlorperazine (COMPAZINE) tablet 5 mg  5 mg Oral Q6H PRN        Or     prochlorperazine (COMPAZINE) Suppository 12.5 mg  12.5 mg Rectal Q12H PRN         heparin  drip 25,000 units in 0.45% NaCl 250 mL (see additional administration details for dose)  0-3,500 Units/hr Intravenous Continuous 5 mL/hr at 06/18/17 0928 500 Units/hr at 06/18/17 0928     heparin bolus from infusion pump   Intravenous Q6H PRN   4,500 Units at 06/17/17 1024     fluticasone-vilanterol (BREO ELLIPTA) 100-25 MCG/INH oral inhaler 1 puff  1 puff Inhalation Daily   1 puff at 06/17/17 0938     gabapentin (NEURONTIN) capsule 400 mg  400  mg Oral BID   400 mg at 06/17/17 2112     insulin detemir (LEVEMIR) injection 7 Units  7 Units Subcutaneous At Bedtime   7 Units at 06/17/17 2115     mirtazapine (REMERON) tablet 30 mg  30 mg Oral At Bedtime   30 mg at 06/17/17 2112     nitroglycerin (NITROSTAT) sublingual tablet 0.4 mg  0.4 mg Sublingual Q5 Min PRN   0.4 mg at 06/17/17 1642     polyethylene glycol (MIRALAX/GLYCOLAX) Packet 17 g  17 g Oral Every Other Day         traMADol (ULTRAM) tablet 50 mg  50 mg Oral Q6H PRN         cholecalciferol (vitamin D) tablet 1,000 Units  1,000 Units Oral Daily   1,000 Units at 06/17/17 1025     multivitamin (OCUVITE) tablet 1 tablet  1 tablet Oral Daily   1 tablet at 06/17/17 1200     clopidogrel (PLAVIX) tablet 75 mg  75 mg Oral Daily   75 mg at 06/17/17 2112     hypromellose-dextran (ARTIFICAL TEARS) ophthalmic solution 1 drop  1 drop Both Eyes TID PRN         benzocaine (ORAJEL MAXIMUM STRENGTH) 20 % gel   Mouth/Throat 4x Daily PRN         aspirin EC EC tablet 325 mg  325 mg Oral Daily         atorvastatin (LIPITOR) tablet 80 mg  80 mg Oral Daily with supper   80 mg at 06/17/17 1824     nitroglycerin 50 mg in D5W 250 mL (adult std) infusion  0.07-2 mcg/kg/min Intravenous Continuous   Stopped at 06/18/17 0732     No current [facility]-ordered outpatient prescriptions on file.       Review of Systems:   As per HPI               Physical Exam:   Vitals were reviewed and are stable  Patient Vitals for the past 24 hrs:   BP Temp Temp src Pulse Heart Rate Resp SpO2   06/18/17 0928 128/60 - - 78 79 16 99 %   06/18/17 0740 104/54 - - - 87 12 96 %   06/18/17 0735 105/58 - - - 77 12 95 %   06/18/17 0730 98/64 - - - 74 (!) 11 96 %   06/18/17 0725 109/60 - - - 73 (!) 9 95 %   06/18/17 0720 127/58 - - - 74 12 94 %   06/18/17 0715 129/69 - - - 79 (!) 6 95 %   06/18/17 0710 121/57 - - - 72 14 99 %   06/18/17 0600 147/65 - - - 93 16 100 %   06/18/17 0400 155/57 98.2  F (36.8  C) Tympanic - 80 16 100 %   06/18/17 0100 (!) 100/35 - - - 96  16 99 %   06/18/17 0000 130/71 - - - 98 12 -   06/17/17 2330 113/62 - - - 84 15 95 %   06/17/17 2300 140/73 97.8  F (36.6  C) Tympanic - 90 14 97 %   06/17/17 2230 94/62 - - - 100 (!) 11 99 %   06/17/17 2200 (!) 108/47 - - - 93 15 95 %   06/17/17 2130 (!) 106/44 - - - 101 (!) 27 99 %   06/17/17 2100 117/74 - - - 85 17 96 %   06/17/17 2030 102/75 - - - 85 14 97 %   06/17/17 2015 103/62 - - - 82 (!) 7 98 %   06/17/17 2010 106/70 - - - 98 (!) 9 98 %   06/17/17 2005 (!) 62/43 - - - 96 13 -   06/17/17 2000 (!) 60/38 - - - 91 (!) 7 -   06/17/17 1955 (!) 87/54 - - - 84 15 99 %   06/17/17 1950 (!) 80/28 - - - 103 17 99 %   06/17/17 1945 (!) 85/41 - - - 106 16 100 %   06/17/17 1940 (!) 87/45 - - - 104 19 96 %   06/1935 94/50 - - - 98 13 99 %   06/17/17 1925 108/65 - - - 96 (!) 11 98 %   06/17/17 1920 115/62 - - - 90 23 98 %   06/17/17 1900 99/83 - - - 109 (!) 11 99 %   06/17/17 1845 - - - - - 15 99 %   06/17/17 1830 108/63 - - - 89 20 95 %   06/17/17 1815 108/64 - - - 104 (!) 11 97 %   06/17/17 1800 104/55 97.6  F (36.4  C) Tympanic - 87 - -   06/17/17 1600 130/83 - - - 84 24 95 %   06/17/17 1400 113/57 - - - - 20 -   06/17/17 1200 119/61 97.4  F (36.3  C) Tympanic - 75 16 97 %   06/17/17 1100 106/51 - - - 75 - -     Constitutional: sleeping, but wakes easily, in no acute distress  Lungs: No increased work of breathing, diminished breath sounds at left base with few rales, fair air movement to right base without rhonchi or wheezes appreciated  Cardiovascular: Regular rate and rhythm without murmurs appreciated  Abdomen: normal bowel sounds, soft, non-distended, non-tender  Neurologic: Sleeping but wakes easily, disoriented to place, events of last 2 days, oriented to person (recognizes her children)   Lower Extremities: right without edema, left with 1+ edema     Peripheral IV 06/17/17 Left Hand (Active)   Site Assessment WDL 6/18/2017  8:00 AM   Line Status Infusing;Checked every 1-2 hour 6/18/2017  8:00 AM    Phlebitis Scale 0-->no symptoms 6/18/2017  4:00 AM   Infiltration Scale 0 6/18/2017  4:00 AM   Infiltration Site Treatment Method  None 6/18/2017  4:00 AM   Number of days:1       Peripheral IV 06/17/17 Right Hand (Active)   Site Assessment WDL 6/18/2017  8:00 AM   Line Status Infusing;Checked every 1-2 hour 6/18/2017  8:00 AM   Phlebitis Scale 0-->no symptoms 6/18/2017  4:00 AM   Infiltration Scale 0 6/18/2017  4:00 AM   Infiltration Site Treatment Method  None 6/18/2017  4:00 AM   Extravasation? No 6/17/2017  3:00 AM   Number of days:1       Rash 09/28/13 0211 Bilateral midline groin (Active)   Number of days:1359     Line/device assessment(s) completed for medical necessity         Data:     Results for orders placed or performed during the hospital encounter of 06/17/17 (from the past 24 hour(s))   Glucose by meter   Result Value Ref Range    Glucose 171 (H) 70 - 99 mg/dL   Troponin I   Result Value Ref Range    Troponin I ES 0.816 (HH) 0.000 - 0.045 ug/L   Heparin Xa (10a) Level   Result Value Ref Range    Heparin 10A Level 1.90 (HH) IU/mL   Magnesium (AM Draw)   Result Value Ref Range    Magnesium 2.1 1.6 - 2.3 mg/dL   UA with Microscopic reflex to Culture   Result Value Ref Range    Color Urine Straw     Appearance Urine Clear     Glucose Urine Negative NEG mg/dL    Bilirubin Urine Negative NEG    Ketones Urine Negative NEG mg/dL    Specific Gravity Urine 1.003 1.003 - 1.035    Blood Urine Negative NEG    pH Urine 7.5 4.7 - 8.0 pH    Protein Albumin Urine Negative NEG mg/dL    Urobilinogen mg/dL Normal 0.0 - 2.0 mg/dL    Nitrite Urine Negative NEG    Leukocyte Esterase Urine Negative NEG    Source Midstream Urine     WBC Urine <1 0 - 2 /HPF    RBC Urine <1 0 - 2 /HPF    Bacteria Urine None (A) NEG /HPF    Mucous Urine Present (A) NEG /LPF   Glucose by meter   Result Value Ref Range    Glucose 219 (H) 70 - 99 mg/dL   Glucose by meter   Result Value Ref Range    Glucose 208 (H) 70 - 99 mg/dL   Heparin Xa  (10a) Level   Result Value Ref Range    Heparin 10A Level 1.11 (HH) IU/mL   Heparin Xa (10a) Level   Result Value Ref Range    Heparin 10A Level 0.62 IU/mL   CBC with platelets   Result Value Ref Range    WBC 3.1 (L) 4.0 - 11.0 10e9/L    RBC Count 3.54 (L) 3.8 - 5.2 10e12/L    Hemoglobin 11.9 11.7 - 15.7 g/dL    Hematocrit 34.2 (L) 35.0 - 47.0 %    MCV 97 78 - 100 fl    MCH 33.6 (H) 26.5 - 33.0 pg    MCHC 34.8 31.5 - 36.5 g/dL    RDW 13.3 10.0 - 15.0 %    Platelet Count 169 150 - 450 10e9/L   Basic metabolic panel   Result Value Ref Range    Sodium 137 133 - 144 mmol/L    Potassium 4.5 3.4 - 5.3 mmol/L    Chloride 102 94 - 109 mmol/L    Carbon Dioxide 27 20 - 32 mmol/L    Anion Gap 8 3 - 14 mmol/L    Glucose 178 (H) 70 - 99 mg/dL    Urea Nitrogen 19 7 - 30 mg/dL    Creatinine 1.31 (H) 0.52 - 1.04 mg/dL    GFR Estimate 38 (L) >60 mL/min/1.7m2    GFR Estimate If Black 46 (L) >60 mL/min/1.7m2    Calcium 8.3 (L) 8.5 - 10.1 mg/dL   Troponin I   Result Value Ref Range    Troponin I ES 5.983 (HH) 0.000 - 0.045 ug/L   Magnesium   Result Value Ref Range    Magnesium 2.2 1.6 - 2.3 mg/dL   Heparin Xa (10a) Level   Result Value Ref Range    Heparin 10A Level 0.47 IU/mL     All cardiac studies reviewed by me - monitor reveals NSR with few PVCs  All imaging studies reviewed by me - CXR this morning reveals left pleural effusion increased size compared to yesterday (preliminary review)         Assessment and Plan:   Active Problems:    Acute transmural inferior wall MI (H) - evidence of large MI with significant damage with troponin 5.983 this AM.  The patient also has evidence of ongoing ischemia with intermittent chest pain.  Continue iv heparin, iv Nitroglycerin as tolerated (limited by hypotension) and iv Dilaudid prn pain not controlled with iv Nitroglycerin.  Continue beta blocker (switch from Coreg to iv Lopressor), ACE inhibitor (switch to iv Enalapril), PPI (switch to iv Protonix).  Continue Aspirin, Plavix and Remeron if  the patient is able to take pos.  Overall prognosis is poor.  The patient had difficulty with ambulation prior to the MI, rehab will be more difficult with concerns re ischemia, worsening fatigue and dyspnea due to CHF.  The patient's son is driving to the area and the family will discuss possible transition to comfort care when he arrives.    Diabetes Mellitus - continue insulin    CHF - presumed systolic dysfunction secondary to acute MI.  Echo scheduled for Monday AM, will cancel if we transition to comfort care    Acute Renal Insufficiency - slightly improved    Hyponatremia - resolved    Dementia  Discussion with patient's daughters re diagnosis, prognosis and expectations if she recovers from this illness (fatigue and dyspnea with ambulation due to CHF, difficult rehab).  They state they had many discussions with her previously re her wishes and she would not want aggressive care.  She has a living will and her daughter has medical power of .         Unable to determine type of CHF.  Echo is unavailable and unneeded as the patient is comfort care.  CHF is presumed  Systolic dysfunction due to acute MI.

## 2017-06-18 NOTE — PLAN OF CARE
Face to face report given with opportunity to observe patient.    Report given to Ana Laura Pedraza   6/18/2017  7:39 AM

## 2017-06-18 NOTE — PLAN OF CARE
DATE:  6/18/2017   TIME OF RECEIPT FROM LAB:  0511  LAB TEST:  Troponin  LAB VALUE:  5.983  RESULTS GIVEN WITH READ-BACK TO (PROVIDER):  Francesca Franco MD  TIME LAB VALUE REPORTED TO PROVIDER:   0515   MD also updated on pt status. No new orders received.

## 2017-06-18 NOTE — PLAN OF CARE
Spoke with Dr. Franco regarding obtaining urine sample as patient is incontinent at this time. Verbal order given to straight cath patient.

## 2017-06-18 NOTE — PROVIDER NOTIFICATION
Patient is currently listed as inpatient ICU status.  Dr. Franco notified and will place orders to transfer patient as a step down.  Patient will remain in room as long as able at this time.

## 2017-06-18 NOTE — PLAN OF CARE
Problem: Patient Goal: Social Work Focus  Goal: 1. Patient Goal: Social Work Focus  Spoke with Dr Andersen and family.  Patient had a very grave insult to her heart last evening.  Dr Franco will be planning on placing patient on comfort care.  Her son will be arriving here at some point during the day.  Remain available to support the family and patient.

## 2017-06-18 NOTE — PLAN OF CARE
Night nurse reports a quick decrease in blood pressure after running the nitro drip during the night.  Patient BP decreased into the 90's systollically.  Patient is resting comfortably at this time.  Turned off nitro drip after discussion with family and based off patient symptoms.

## 2017-06-18 NOTE — PLAN OF CARE
Pt started having chest pain at 1915. Nitroglycerin gtt was increased to 0.2mcg/kg/min with some pain relief. After 10 minutes pain started to worsen again and radiate into left side of neck and down left arm. Nitroglycerin gtt was increased to 0.3mcg/kg/min with little relief. 0.2mcg of hydromorphone given and pt reported complete pain relief within 10 minutes of administering hydromorphone. After pain relief patient BP dropped to a low of 80/28, prn NS bolus initiated. Nitro gtt turned off after BP dropped to 60/38. Pt still denying chest pain after nitro gtt was turned off. BP returned to 92/60.

## 2017-06-18 NOTE — PLAN OF CARE
Problem: Goal Outcome Summary  Goal: Goal Outcome Summary  Outcome: Improving  Pt had pain at the beginning of the shift. Nitroglycerin gtt was on for a short period of time before patient was no longer able to tolerate the medication. 0.2mg Hydromorphone also given at this time for pain. 500 mL bolus given to patient and gtt turned off. BP stable since. Pt had another episode of chest pain around 2140 that she described to be less than it was before rating it a 5/10. Hydromorphone given with relief. Afebrile this shift. Continues to be disoriented and very forgetful.  Lung sounds clear. No edema noted. Redness to groins and breasts worsening, barrier cream applied. XA elevated at 2345 check, heparin gtt held for 60 minutes and restarted at 650 units/hr. Next XA due at 0830. Troponin 5.983. Daughter, Niecy,remained at bedside throughout night.

## 2017-06-18 NOTE — PLAN OF CARE
Problem: Goal Outcome Summary  Goal: Goal Outcome Summary  Patient is resting comfortably at this time.  Family/ POA present in room and requests to allow patient to remain resting at this time.  Updated family heparin 10A level will need to be drawn this AM for adjustment of heparin.  Family is ok with this as of this time.  Family notes they are waiting for additional family members to see patient and then will update nurse and MD on plan of care.  Will await further communications.  Currently patient is not showing any verbal or nonverbal s/sx of pain or discomfort.  Lungs are clear bilat.  Bowel sounds positive X4.  Pedal pulses palpable and no edema noted.  Plan to continue to observe.     1015:  MD here visiting with patient family.  MD states the plan is to transition patient to comfort cares when son arrives, stop all medications, labs, and cardiac monitor for patient comfort.     1130: Per family and MD all monitors have been removed, all medications have stopped, patient family here and plan is to provide comfort cares.  Will continue to observe.     1530:  Dr. Franco here and updated on patient condition.  Dr. Franco agrees patient does not need q 4 hour vitals, and states patient will go medical surgical status.     1852:  Patient c/o pain off and on throughout the shift.  Dilaudid given for pain control as needed and has been effective as of this time. Patient family has been here most of the shift.  Patient eating and drinking some liquids throughout the shift.  Plan to continue to observe.

## 2017-06-19 NOTE — PLAN OF CARE
Problem: Goal Outcome Summary  Goal: Goal Outcome Summary  Outcome: Improving  Pt remained confused but more alert and strong enough to get OOB and up to commode to void. She did not have any chest pain throughout the night. She did c/o of some left  hip discomfort relieved with 50 mg PO Ultram. She also received 1 dose of 0.5mg po ativan and then slept well. She was compliant with cares this am. Dr Franco here with plans to talk to family who were here all night. Pt is currently on comfort cares.

## 2017-06-19 NOTE — PHARMACY
Natural balance tears substituted for Refresh Plus per automatic substitution protocol  June 19, 2017  Cassandra Maria

## 2017-06-19 NOTE — PLAN OF CARE
Face to face report given with opportunity to observe patient.    Report given to Olga Henderson   6/19/2017  3:30 PM

## 2017-06-19 NOTE — PLAN OF CARE
Face to face report given with opportunity to observe patient.    Report given to SHERYL Sellers.     Lakisha Wahl   6/18/2017  7:10 PM

## 2017-06-19 NOTE — PROGRESS NOTES
[facility] Practice Progress Note               Interval History:   Pt is doing better in chair today still confused but responds to questions              Review of Systems:   The 5 point Review of Systems is negative other than noted in the HPI             Medications:   I have reviewed this patient's current medications               Physical Exam:   Vitals were reviewed  Lungs:   Some basilar crackles will watch fluids     Cardiovascular:   Normal apical impulse, regular rate and rhythm, normal S1 and S2, no S3 or S4, and no murmur noted     Musculoskeletal:   No edema     Neurologic:   Awake and responds but still some confusion as to what happened to her         Peripheral IV 06/17/17 Right Hand (Active)   Site Assessment WDL 6/19/2017  4:00 AM   Line Status Saline locked 6/19/2017  4:00 AM   Phlebitis Scale 0-->no symptoms 6/19/2017  4:00 AM   Infiltration Scale 0 6/19/2017  4:00 AM   Infiltration Site Treatment Method  None 6/18/2017  4:00 AM   Extravasation? No 6/17/2017  3:00 AM   Number of days:2       Rash 09/28/13 0211 Bilateral midline groin (Active)   Number of days:1360     Line/device assessment(s) completed for medical necessity         Data:   All laboratory data reviewed         Assessment and Plan:   Active Problems:    Acute transmural inferior wall MI (H)    Assessment: stable for now no pain noted and seems to be recovering     Plan: will cont to monitor for heart failure and watch fluid balance will try to get ambulatory and if participates will look to NH for rehab

## 2017-06-19 NOTE — PLAN OF CARE
Pt sleeping comfortably, skin warm and dry, respirations easy. Family at bedside. Reviewed plan of care for comfort measures. No interventions at this time. Family very supportive and understanding.

## 2017-06-19 NOTE — PLAN OF CARE
Problem: Patient Goal: Social Work Focus  Goal: 1. Patient Goal: Social Work Focus  Spoke with Dr Cagle who states he is waiting for the echo results.  He also notes he thinks she will be up and down as far as her medical prognosis may go from here.  Son notes she could potentially do rehab again which is quite a surprise considering she was made comfort care on Sunday.  Will have a family meeting with family at 11:30 as I hope to discuss plan for care and expectations for patient.

## 2017-06-19 NOTE — PLAN OF CARE
OOB to commode to void without c/o chest pain. Assessment complete. Hs cares completed. C/o of some left hip pain and slight HA. slightly anxious with needs . Re orientated to place,surroundings and situation. Pt remains confused. 50 mg of ultram po and 0.5 mg of po ativan. Hs and oral cares completed. Family remains at bedside.

## 2017-06-19 NOTE — PLAN OF CARE
Face to face report given with opportunity to observe patient.    Report given to Rosette Joy   6/19/2017  7:44 AM

## 2017-06-19 NOTE — PLAN OF CARE
Problem: Goal Outcome Summary  Goal: Goal Outcome Summary  Outcome: Improving  Pt has been afebrile through the day, VS as charted.  Her O2 sats are in the upper 90's on 2 LPM via NC, lung sounds are clear/diminished.  She has denied any pain/discomfort, no chest pain.  She did get up to sit in chair for a little while this morning, tolerated well, transfers with assist of 2 - generalized weakness.  We did move her room to 3116 - transferred well - tolerated well.  She did restart on her oral medications today - stated she was hungry - family ordered lunch for her.  She has had family present throughout the day - very attentive to patient needs.

## 2017-06-20 PROBLEM — Z51.5 HOSPICE CARE PATIENT: Status: ACTIVE | Noted: 2017-01-01

## 2017-06-20 NOTE — PLAN OF CARE
"Problem: Goal Outcome Summary  Goal: Goal Outcome Summary  Outcome: No Change  No VSS or shift assessment completed as patient is on comfort cares.  O2 remains on at 2LPM.  Has been very restless the lart 3 hours moving from to bed to chair.  States \"I can't get comfortable.\"  Medicated with 0.4mg of Dilaudid at 2242 to try and get patient to relax and sleep and also for bilateral knee pain.  Was resting comfortably at  2300.  Up frequently to void throughout shift.  Daughter will be staying over night.      "

## 2017-06-20 NOTE — PLAN OF CARE
Problem: Patient Goal: Social Work Focus  Goal: 1. Patient Goal: Social Work Focus  Outcome: Adequate for Discharge Date Met:  06/20/17  Name: Louise Johnson     MRN#: [id number]     Reason for Hospitalization: Acute myocardial infarction, initial episode of care (H) [I21.3]  STEMI (ST elevation myocardial infarction) (H) [I21.3]     Discharge Date: June 20, 2017     Patient / Family response to discharge plan: The patient and family agree to a plan to go to [facility].  They will add hospice to her plan of care.  Orders were sent as was her Health Care directive.  Orders sent to hospice.  Transportation arranged with the cost of transportation given to son, Herb of 100-125.  PAS to be completed.       Follow-Up Appt: No future appointments.     Other Providers (Care Coordinator, County Services, PCA services etc): NO     Discharge Disposition: [facility] with hospice     Amberly Thompson

## 2017-06-20 NOTE — PLAN OF CARE
Nurse to nurse report called to Jaylyn RN at [facility].    Patient discharged at 10:00 AM via cart accompanied by daughter and [facility]. Prescriptions - None ordered for discharge. All belongings sent with patient.     Discharge instructions reviewed with Family, and nurse to nurse to Jaylyn at . Listed belongings gathered and returned to patient. Yes-family gathered belongings    Patient discharged to [facility].   Report called to Nursing Home:  yes    Core Measures and Vaccines  Core Measures applicable during stay: Yes. If yes, state diagnosis: MI  Pneumonia and Influenza given prior to discharge, if indicated: No    Surgical Patient   Surgical Procedures during stay: NO  Did patient receive discharge instruction on wound care and recognition of infection symptoms? N/A    MISC  Follow up appointment made:  No  Home and hospital aquired medications returned to patient: No - family will  later today  Patient reports pain was well managed at discharge: Yes

## 2017-06-20 NOTE — DISCHARGE SUMMARY
[facility]    Discharge Summary  Hospitalist    Date of Admission:  6/17/2017  Date of Discharge:  6/20/2017  Discharging Provider: Boston Cagle  Date of Service (when I saw the patient): 06/20/17      Hospital Course   Louise Johnson was admitted on 6/17/2017.  The following problems were addressed during her hospitalization:    Active Problems:    Acute transmural inferior wall MI (H)    Assessment: stable and slowly improving but pt has dementia which has worsened from this and will need comfort care and hospice has been consulted.    Plan: will transfer to NH today and add hospice to help bridge the transition.      Boston Cagle    Significant Results and Procedures       Pending Results   These results will be followed up by ismael  Unresulted Labs Ordered in the Past 30 Days of this Admission     No orders found from 4/18/2017 to 6/18/2017.          Code Status   DNR / DNI       Primary Care Physician   Boston Cagle    Physical Exam   Temp: 97.9  F (36.6  C) Temp src: Tympanic BP: 126/60   Heart Rate: 93 Resp: 18 SpO2: 96 % O2 Device: Nasal cannula Oxygen Delivery: 2 LPM  Vitals:    06/16/17 2300 06/17/17 0153   Weight: 89.8 kg (198 lb) 85.5 kg (188 lb 7.9 oz)     Vital Signs with Ranges  Temp:  [97.9  F (36.6  C)] 97.9  F (36.6  C)  Heart Rate:  [77-93] 93  Resp:  [18-20] 18  BP: (126)/(60) 126/60  SpO2:  [96 %-98 %] 96 %  I/O last 3 completed shifts:  In: 360 [P.O.:360]  Out: 1550 [Urine:1550]    Respiratory: No increased work of breathing, good air exchange, clear to auscultation bilaterally, no crackles or wheezing  Cardiovascular: Normal apical impulse, regular rate and rhythm, normal S1 and S2, no S3 or S4, and no murmur noted  Neurologic: awake but confused and so far still cooperative. No focal defecits    Discharge Disposition   Discharged to nursing home  Condition at discharge: Stable    Consultations This Hospital Stay   SOCIAL WORK IP CONSULT  PHYSICAL THERAPY  ADULT IP CONSULT  OCCUPATIONAL THERAPY ADULT IP CONSULT    Time Spent on this Encounter   IBoston, personally saw the patient today and spent greater than 30 minutes discharging this patient.    Discharge Orders     HOSPICE REFERRAL     General info for SNF   Length of Stay Estimate: Long Term Care  Condition at Discharge: Stable  Level of care:skilled   Rehabilitation Potential: Fair  Admission H&P remains valid and up-to-date: Yes  Recent Chemotherapy: N/A  Use Nursing Home Standing Orders: Yes     Mantoux instructions   Give two-step Mantoux (PPD) Per Facility Policy Yes     Glucose monitor nursing POCT   Before meals and at bedtime     Activity - Up ad jian     DNR/DNI     Advance Diet as Tolerated   Follow this diet upon discharge: Regular       Discharge Medications   Current Discharge Medication List      CONTINUE these medications which have NOT CHANGED    Details   Furosemide (LASIX PO) Take 40 mg by mouth daily      !! TRAMADOL HCL PO Take 50 mg by mouth daily       fluticasone-salmeterol (ADVAIR) 250-50 MCG/DOSE diskus inhaler Inhale 1 puff into the lungs 2 times daily      Insulin Detemir (LEVEMIR SC) Inject 7 Units Subcutaneous At Bedtime       Acetaminophen (TYLENOL PO) Take 500 mg by mouth every 6 hours as needed for mild pain or fever      NITROGLYCERIN SL Place 0.4 mg under the tongue every 5 minutes as needed for chest pain x3 prn      MIRTAZAPINE PO Take 30 mg by mouth At Bedtime      ASPIRIN ADULT LOW STRENGTH PO Take 81 mg by mouth daily      CARVEDILOL PO Take 6.25 mg by mouth 2 times daily (with meals)      polyethylene glycol (MIRALAX/GLYCOLAX) powder Take 1 capful by mouth every other day       GABAPENTIN PO Take 400 mg by mouth 2 times daily       OXYBUTYNIN CHLORIDE PO Take 10 mg by mouth At Bedtime       VITAMIN D, CHOLECALCIFEROL, PO Take 1,000 Units by mouth daily      multivitamin (OCUVITE) TABS Take 1 tablet by mouth 2 times daily       LISINOPRIL PO Take 40 mg by mouth  daily      Simvastatin (ZOCOR PO) Take 20 mg by mouth At Bedtime       !! TRAMADOL HCL PO Take 50 mg by mouth every 6 hours as needed for moderate to severe pain      carboxymethylcellulose (REFRESH PLUS) 0.5 % SOLN ophthalmic solution Place 1 drop into both eyes daily as needed for dry eyes       !! - Potential duplicate medications found. Please discuss with provider.      STOP taking these medications       NONFORMULARY Comments:   Reason for Stopping:         amLODIPine (NORVASC) 5 MG tablet Comments:   Reason for Stopping:         terbinafine (LAMISIL) 250 MG tablet Comments:   Reason for Stopping:             Allergies   Allergies   Allergen Reactions     Sulfasalazine Anaphylaxis     Pt takes asa & ibuprofen at home     Cimetidine      Codeine Sulfate      Tolerated percocet, lortab, and ultram     Diflucan      Iodine I 131 Tositumomab      Morphine Sulfate      No Clinical Screening - See Comments      Flu vaccine     Tagamet      Penicillins Rash     Entire body     Data   Most Recent 3 CBC's:  Recent Labs   Lab Test  06/20/17   0507  06/18/17   0443  06/17/17   0804   WBC  3.3*  3.1*  4.1   HGB  12.5  11.9  13.3   MCV  96  97  98   PLT  187  169  145*      Most Recent 3 BMP's:  Recent Labs   Lab Test  06/20/17   0507  06/18/17   0443  06/17/17   0804   NA  134  137  133   POTASSIUM  4.0  4.5  4.5   CHLORIDE  100  102  97   CO2  24  27  28   BUN  14  19  20   CR  1.12*  1.31*  1.38*   ANIONGAP  10  8  8   KAILA  8.4*  8.3*  8.0*   GLC  186*  178*  220*     Most Recent 2 LFT's:  Recent Labs   Lab Test  06/17/17   0007  06/02/17   0957   AST  51*  34   ALT  40  33   ALKPHOS  98  96   BILITOTAL  0.4  0.5     Most Recent INR's and Anticoagulation Dosing History:  Anticoagulation Dose History     Recent Dosing and Labs Latest Ref Rng & Units 9/25/2013 5/25/2016 6/2/2017 6/17/2017    INR 0.80 - 1.20 1.12 1.16 1.15 1.01        Most Recent 3 Troponin's:  Recent Labs   Lab Test  06/18/17   0443  06/17/17   1626   06/17/17   0804   TROPI  5.983*  0.816*  0.224*     Most Recent Cholesterol Panel:No lab results found.  Most Recent 6 Bacteria Isolates From Any Culture (See EPIC Reports for Culture Details):  Recent Labs   Lab Test  06/17/17   0258  06/02/17   1509  06/02/17   1140  06/02/17   1130  06/02/17   1035  09/20/16   1257   CULT  No MRSA isolated  No MRSA isolated  No growth after 6 days  No growth after 6 days  >100,000 colonies/mL Escherichia coli*  No MRSA isolated     Most Recent TSH, T4 and A1c Labs:  Recent Labs   Lab Test  06/17/17   0804  06/02/17   0957   TSH   --   1.65   A1C  9.7*   --

## 2017-06-20 NOTE — PLAN OF CARE
Problem: Goal Outcome Summary  Goal: Goal Outcome Summary  Outcome: No Change  Pt slept most of the night other then getting up to the BSC multiple times with small voids.  Daughter spent the night at bedside.  On 2 liters of 02 with adequate sats.  Pt independently moves in bed.  Compaints of a terrible headache in the beginning of the shift and ultram given with adequate relief. No change to skin integrity this shift.  Bed alarm on with no attempts to get out of bed by herself.  Daughter uses pt's call light with appropriate needs.

## 2017-06-20 NOTE — PLAN OF CARE
Problem: Goal Outcome Summary  Goal: Goal Outcome Summary  Outcome: No Change  Order received for PT evaluation however pt set up to DC to SNF today so order deferred at this time.

## 2017-06-20 NOTE — PLAN OF CARE
Problem: Goal Outcome Summary  Goal: Goal Outcome Summary  Outcome: No Change  Pt. Has been afebrile through the morning, VS as charted.  Her O2 sats are in the mid 90's on 2 LPM via NC - did take her O2 off and her sats remained in the mid 90's on RA, lung sounds are with fine crackles in the bases/diminished.  She did complain of her arms/legs being really heavy (not necessarily as pain) did receive Ultram.  Family present in room and has declined all medications besides pain/anxienty, they also declined blood glucose checking.  She did get up and sit is chair this morning-tolerated well, also up a couple time to the [facility] transferring well with assist of 1 and gait belt.  She has remained free of falls, call light within reach-encouraged to use to make her needs known, frequent rounding done to assess needs, family present and very attentive to pt. Needs.  [facility]

## 2017-06-29 NOTE — PROGRESS NOTES
PHYSICIAN CERTIFICATION OF TERMINAL ILLNESS FOR HOSPICE BENEFIT    June 20, 2017    Louise Johnson    4/16/1929      Effective Date of Certification      Start Date: 06/20/2017        End Date:  09/17/2017    Terminal Diagnosis:    HF    Secondary Diagnoses:     CHD     Atrial fibrillation     HTN      Prognosis Related Comorbidities:    Diabetes mellitus, type 2     Depression    Dementia      Narrative Statement:  Client suffers from end stage heart failure and is on maximal tolerated medical therapy.  She continues to decline. [facility]  and requires assistance with ADL's.  Goals of symptom control will be met.  Because of the progressive nature of the illness and associated comorbidities, client qualifies for hospice care.             I certify that this patient is terminally ill and has a life expectancy of 6 months or less if the terminal illness runs its anticipated course.        Attestation:  I confirm that this narrative was composed by myself and is based on review of the medical record and/or examination of the patient.            Tee Marquez MD

## 2021-02-17 NOTE — ED NOTES
New pt referral to Dr. Youngblood received from Dr. Arceo's office for Dx Shortness of breath on exertion.     Pt reports SOB with exertion for present \"for years\". Pt denies any chest pain or palpitations.   Reports previously on metoprolol succinate 50 mg BID. She cut down to 50 mg daily by herself as she thought medication might be the culprit of her symptoms. Most recently reports PCP cutting medication down further to 25 mg daily.  However, SOB of breath persisting. Pt reports although SOB present, \" SOB does not slow me down.\" Pt able to go on long walks and bike rides.     Dr. Youngblood,     Please advise where we might be able to fit pt for office visit.   Reports seeing you about 8 years ago.   Would prefer [facility], but will travel to [facility].   Would prefer appt on a Tuesday, Wed or Thursday.        Prehospital EKG done at 2337.